# Patient Record
Sex: MALE | Race: WHITE | NOT HISPANIC OR LATINO | Employment: FULL TIME | URBAN - METROPOLITAN AREA
[De-identification: names, ages, dates, MRNs, and addresses within clinical notes are randomized per-mention and may not be internally consistent; named-entity substitution may affect disease eponyms.]

---

## 2017-06-28 DIAGNOSIS — Z01.20 ENCOUNTER FOR DENTAL EXAMINATION: ICD-10-CM

## 2017-06-28 DIAGNOSIS — K03.6 ACCRETIONS ON TEETH: ICD-10-CM

## 2017-06-28 DIAGNOSIS — K03.6 DENTAL CALCULUS: ICD-10-CM

## 2017-06-28 DIAGNOSIS — K02.9 DENTAL CARIES: ICD-10-CM

## 2018-01-05 ENCOUNTER — ALLSCRIPTS OFFICE VISIT (OUTPATIENT)
Dept: OTHER | Facility: OTHER | Age: 63
End: 2018-01-05

## 2018-01-05 LAB
CLARITY UR: NORMAL
COLOR UR: YELLOW
GLUCOSE (HISTORICAL): NORMAL
HGB UR QL STRIP.AUTO: NORMAL
KETONES UR STRIP-MCNC: NORMAL MG/DL
LEUKOCYTE ESTERASE UR QL STRIP: NORMAL
NITRITE UR QL STRIP: NORMAL
PH UR STRIP.AUTO: 5 [PH]
PROT UR STRIP-MCNC: NORMAL MG/DL

## 2018-04-27 DIAGNOSIS — E29.1 MALE HYPOGONADISM: Primary | ICD-10-CM

## 2018-06-26 ENCOUNTER — TELEPHONE (OUTPATIENT)
Dept: UROLOGY | Facility: CLINIC | Age: 63
End: 2018-06-26

## 2018-06-26 DIAGNOSIS — E29.1 TESTICULAR HYPOFUNCTION: ICD-10-CM

## 2018-06-26 DIAGNOSIS — N40.1 BPH WITH OBSTRUCTION/LOWER URINARY TRACT SYMPTOMS: Primary | ICD-10-CM

## 2018-06-26 DIAGNOSIS — N13.8 BPH WITH OBSTRUCTION/LOWER URINARY TRACT SYMPTOMS: Primary | ICD-10-CM

## 2018-06-26 NOTE — TELEPHONE ENCOUNTER
Pt  Called this morning requesting new copies of his Testosterone free/total and Psa scripts  Pt said he would be by our office tomorrow morning to pick them up

## 2018-06-29 ENCOUNTER — TRANSCRIBE ORDERS (OUTPATIENT)
Dept: ADMINISTRATIVE | Facility: HOSPITAL | Age: 63
End: 2018-06-29

## 2018-06-29 ENCOUNTER — APPOINTMENT (OUTPATIENT)
Dept: LAB | Facility: HOSPITAL | Age: 63
End: 2018-06-29
Payer: COMMERCIAL

## 2018-06-29 DIAGNOSIS — N40.1 BPH WITH OBSTRUCTION/LOWER URINARY TRACT SYMPTOMS: ICD-10-CM

## 2018-06-29 DIAGNOSIS — E29.1 TESTICULAR HYPOFUNCTION: ICD-10-CM

## 2018-06-29 DIAGNOSIS — N13.8 BPH WITH OBSTRUCTION/LOWER URINARY TRACT SYMPTOMS: ICD-10-CM

## 2018-06-29 LAB — PSA SERPL-MCNC: 0.3 NG/ML (ref 0–4)

## 2018-06-29 PROCEDURE — 84402 ASSAY OF FREE TESTOSTERONE: CPT

## 2018-06-29 PROCEDURE — 36415 COLL VENOUS BLD VENIPUNCTURE: CPT

## 2018-06-29 PROCEDURE — 84153 ASSAY OF PSA TOTAL: CPT

## 2018-06-29 PROCEDURE — 84403 ASSAY OF TOTAL TESTOSTERONE: CPT

## 2018-06-30 LAB
TESTOST FREE SERPL-MCNC: 4.9 PG/ML (ref 6.6–18.1)
TESTOST SERPL-MCNC: 231 NG/DL (ref 264–916)

## 2018-10-26 ENCOUNTER — TRANSCRIBE ORDERS (OUTPATIENT)
Dept: ADMINISTRATIVE | Facility: HOSPITAL | Age: 63
End: 2018-10-26

## 2018-10-26 ENCOUNTER — HOSPITAL ENCOUNTER (OUTPATIENT)
Dept: NON INVASIVE DIAGNOSTICS | Facility: HOSPITAL | Age: 63
Discharge: HOME | End: 2018-10-26
Payer: COMMERCIAL

## 2018-10-26 DIAGNOSIS — M54.50 ACUTE MIDLINE LOW BACK PAIN WITHOUT SCIATICA: Primary | ICD-10-CM

## 2018-10-26 DIAGNOSIS — R22.42 LEG MASS, LEFT: Primary | ICD-10-CM

## 2018-10-26 DIAGNOSIS — R22.42 LEG MASS, LEFT: ICD-10-CM

## 2018-10-26 PROCEDURE — 93971 EXTREMITY STUDY: CPT | Performed by: SURGERY

## 2018-10-26 PROCEDURE — 93971 EXTREMITY STUDY: CPT

## 2018-11-02 ENCOUNTER — HOSPITAL ENCOUNTER (OUTPATIENT)
Dept: MRI IMAGING | Facility: HOSPITAL | Age: 63
Discharge: HOME | End: 2018-11-02
Payer: COMMERCIAL

## 2018-11-02 DIAGNOSIS — M54.50 ACUTE MIDLINE LOW BACK PAIN WITHOUT SCIATICA: ICD-10-CM

## 2018-11-02 PROCEDURE — 72148 MRI LUMBAR SPINE W/O DYE: CPT

## 2018-12-07 ENCOUNTER — TELEPHONE (OUTPATIENT)
Dept: NEUROLOGY | Facility: CLINIC | Age: 63
End: 2018-12-07

## 2019-01-16 ENCOUNTER — OFFICE VISIT (OUTPATIENT)
Dept: NEUROLOGY | Facility: CLINIC | Age: 64
End: 2019-01-16
Payer: COMMERCIAL

## 2019-01-16 VITALS
DIASTOLIC BLOOD PRESSURE: 70 MMHG | HEART RATE: 67 BPM | BODY MASS INDEX: 29.33 KG/M2 | HEIGHT: 71 IN | WEIGHT: 209.5 LBS | SYSTOLIC BLOOD PRESSURE: 116 MMHG

## 2019-01-16 DIAGNOSIS — G20 PARKINSONISM, UNSPECIFIED PARKINSONISM TYPE (HCC): Primary | ICD-10-CM

## 2019-01-16 PROBLEM — G20.C PARKINSONISM: Status: ACTIVE | Noted: 2019-01-16

## 2019-01-16 PROBLEM — L40.9 PSORIASIS: Status: ACTIVE | Noted: 2019-01-16

## 2019-01-16 PROBLEM — I10 HYPERTENSION: Status: ACTIVE | Noted: 2019-01-16

## 2019-01-16 PROBLEM — M19.90 ARTHRITIS: Status: ACTIVE | Noted: 2019-01-16

## 2019-01-16 PROCEDURE — 99244 OFF/OP CNSLTJ NEW/EST MOD 40: CPT | Performed by: PSYCHIATRY & NEUROLOGY

## 2019-01-16 RX ORDER — ASPIRIN 81 MG/1
1 TABLET, CHEWABLE ORAL DAILY
COMMUNITY
Start: 2018-01-05 | End: 2020-09-01 | Stop reason: ALTCHOICE

## 2019-01-16 NOTE — ASSESSMENT & PLAN NOTE
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 gait dysfunction which consists of stooped posture, balance troubles and slowed walking speed, symptom onset in 2017  On examination the patient has fairly symmetric parkinsonism with moderate akinesia, mild symmetric rigidity in the bilateral upper and lower extremities, slight to mild focal bradykinesia bilaterally and clear gait dysfunction with absent arm swing bilaterally in a trunk lean to the right  The patient has no tremor on exam   Positive pull test  Possible rare dream reenactment  The patient has clear parkinsonism which developed 1-2 years ago, 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

## 2019-01-16 NOTE — PROGRESS NOTES
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 gait dysfunction which consists of stooped posture, balance troubles and slowed walking speed, symptom onset in 2017  On examination the patient has fairly symmetric parkinsonism with moderate akinesia, mild symmetric rigidity in the bilateral upper and lower extremities, slight to mild focal bradykinesia bilaterally and clear gait dysfunction with absent arm swing bilaterally in a trunk lean to the right  The patient has no tremor on exam   Positive pull test  Possible rare dream reenactment  The patient has clear parkinsonism which developed 1-2 years ago, 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  Diagnoses and all orders for this visit:    Parkinsonism, unspecified Parkinsonism type ([address] )  -     Uric acid; Future  -     PTH, intact; Future  -     Ceruloplasmin; Future  -     COPPER, URINE, 24 HOUR; Future  -     MRI brain wo contrast; Future    Other orders  -     aspirin 81 mg chewable tablet; Chew 1 tablet daily  -     Multiple Vitamins-Minerals (MULTIVITAMIN ADULT PO); Take 1 tablet by mouth daily           Subjective:    HPI    I had the pleasure of seeing your patient, Anastacia Hatchet in the [address] at the [facility]  The patient is a 70y o  year old right handed male who presents for evaluation of gait abnormalities    The patient reports that his symptoms were 1st noticed 15 months ago when his brother was visiting from out of town  He observed that the patient had slowed down considerably  Patient is in urgent care doctor in our system and noticed that he had been slowing down towards the end of the day for the past 1-2 years  He finds that his walking slows considerably and his speech can get quite soft  He was evaluated at [facility] for possible orthopedic issues and by ENT given his voice complaints  He was found to have some degenerative disc disease in his back and arthritis in his knees bilaterally  He underwent some injections which helped some with range of motion but not so much with his stiffness, which she describes as being worse on the right than the left  He has noticed that his posture has become more stooped  He has difficulty getting up out of a chair and when sitting down he has a tendency to "flop down  He has never been evaluated by a neurologist for this in the past   He has never been treated for the symptoms with any medications  He denies neuroleptics exposures  Regarding motor symptoms:   Tremor: no   Slowness: walking is slow  Putting his seatbelt on  "I said put the seatbelt on"   Stiffness: knees have been stiff  nothing otherwise  Dystonia: no   Changes in facial expression: seems more flat   Changes in voice: softer and hoarse as the day goes on  ENT workup was fine   Changes in writing: smaller   Changes in gait: prominenet issue, stooped posture, slow  Mild balance troubles  Falls: no    Freezing: maybe rarely  End of the day  Very brief  Trouble with swallowing: rarely dry food sticks some   Clumsiness/dexterity: no     Regarding non-motor symptoms:   Anosmia: no issue   Constipation: intentional weight loss, some with diet change   Urinary sx: 3x nocturia, rarely will dribble, urgency  Lightheadedness: no   Changes in Mood: no issues  Some anxiety with the transition from [facility]     Trouble with sleep: no issues  Fragmented only by nocturia      REM behavior Disorder: no  Once fell out of bed a few years ago during dream  No vivid dreams      Sleep apnea sx: never tested  Snores  PCP was concerned about this   Memory trouble: no issue   Hallucinations: no       The following portions of the patient's history were reviewed and updated as appropriate: allergies, current medications, past family history, past medical history, past social history, past surgical history and problem list     [facility]  Objective:    Blood pressure 116/70, pulse 67, height 5' 11" (1 803 m), weight 95 kg (209 lb 8 oz)  Physical Exam    Neurological Exam    GENERAL MEDICAL EXAMINATION:  General appearance: alert, in no apparent distress  Appropriately dressed and groomed  Conversing and interacting appropriately  Eyes: Sclera are non-injected  Ears, nose, Mouth, Throat: Mucous membranes are moist    Resp: Breathing comfortably on RA   Musculoskeletal: No evidence of deformities  No contractures  No Edema  Skin: some sebaric skin changes   Psych: normal and appropriate affect     NEUROLOGICAL EXAMINATION:  Mental Status: Alert and oriented to person place and time  Able to relate history without difficulty  Attentive: able to name VALENTÍN backward without difficulty  Language is fluent and appropriate with normal prosody  There were no paraphasic errors  Speech was not dysarthric  There was no pallilalia noted  Able to follow both midline and appendicular commands  Cranial Nerves:  II:  pupils equally round and briskly reactive to light, both directly and consensually  III-IV-VI: Full and conjugate, extra-ocular eye movements in all directions of gaze  No nystagmus or diplopia  ? slighly slowed horizontal and vertical saccades (good initiation)  Some breakdown  smooth pursuit  V: Symmetric perception of LT in V1-3      VII: Face is symmetric at rest and with activation; symmetric speed and excursion with smile  VIII: Hearing intact to finger rub bl  IX-X: Palate elevates symmetrically  XII: No tongue deviation or fasciculations    Motor:  Normal muscle bulk throughout  There were no dyskinesias or dystonia noted  No pronator drift or rebound  No asterixis or myoclonus noted  UPDRS motor       Speech  2    Facial Expression  2    Rigidity - Neck  1    Rigidity - Upper Extremity (Right)  2    Rigidity - Upper Extremity (Left)   2    Rigidity - Lower Extremity (Right)  2    Rigidity - Lower Extremity (Left)   2    Finger Taps (Right)   2 dec    Finger Taps (Left)   2    Hand Movement (Right)  2    Hand Movement (Left)   2    Pronation/Supination (Right)  1    Pronation/Supination (Left)   2    Toe Tapping (Right) 2    Toe Tapping (Left) 1    Leg Agility (Right)  2    Leg Agility (Left)   1    Arising from Chair   2    Gait   2    Freezing of Gait 0    Postural Stability   1    Posture 3    Global spontaneity of movement 3    Postural Tremor (Right) 0    Postural Tremor (Left) 0    Kinetic Tremor (Right)  0    Kinetic Tremor (Left)  0    Rest tremor amplitude RUE 0    Rest tremor amplitude LUE 0    Rest tremor amplitude RLE 0    Reset tremor amplitude LLE 0    Lip/Jaw Tremor  0    Consistency of tremor 0    Motor Exam Total:        Leaning over to the right  No arm swing bl, reduced stride length and significantl reduced step height  Turn in 4 steps  3-4 steps back on pull test      Strength:   Delt Bi Tri We FE FF    C5 C6 C7 C6 C7 C8/T1   R 5 5 5 5 5 5   L 5 5 5 5 5 5      IP Quad Hamst TA    L2 L3 L4-S1 L4   R 5 5 5 5   L 5 5 5 5     Reflexes:  2+ and symmetric throughout      Sensory: normal and symmetric perception of light touch, and temperature  Coordination: Finger to nose without dysmetria bilaterally  No intention tremor  ROS:    Review of Systems   Constitutional: Negative  Negative for appetite change and fever  HENT: Negative    Negative for hearing loss, tinnitus, trouble swallowing and voice change  Eyes: Negative  Negative for photophobia and pain  Respiratory: Negative  Negative for shortness of breath  Cardiovascular: Negative  Negative for palpitations  Gastrointestinal: Negative  Negative for nausea and vomiting  Endocrine: Negative  Negative for cold intolerance and heat intolerance  Erection difficulty   Genitourinary: Positive for urgency  Negative for dysuria and frequency  Musculoskeletal: Positive for gait problem  Negative for myalgias and neck pain  Skin: Negative  Negative for rash  Neurological: Negative for dizziness, tremors, seizures, syncope, facial asymmetry, speech difficulty, weakness, light-headedness, numbness and headaches  Hematological: Negative  Does not bruise/bleed easily  Rash   Psychiatric/Behavioral: Negative  Negative for confusion, hallucinations and sleep disturbance  The above ROS was reviewed and updated       Dallin Stern MD  Medical Director   [facility]  [facility]

## 2019-01-16 NOTE — LETTER
January 16, 2019     Unknown Rank, MD  [address]  [address] [place] [address]    Patient: José Acuña   YOB: 1955   Date of Visit: 1/16/2019       Dear Dr Enedina Serna: Thank you for referring José Acuña to me for evaluation  Below are my notes for this consultation  If you have questions, please do not hesitate to call me  I look forward to following your patient along with you  Sincerely,        Carlos Guerra MD        CC: DO Carlos Lopez MD  1/16/2019  1:54 PM  Sign at close encounter  Patient ID: José Acuña is a 70 y o  male  Assessment/Plan:    Parkinsonism [facility])    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 gait dysfunction which consists of stooped posture, balance troubles and slowed walking speed, symptom onset in 2017  On examination the patient has fairly symmetric parkinsonism with moderate akinesia, mild symmetric rigidity in the bilateral upper and lower extremities, slight to mild focal bradykinesia bilaterally and clear gait dysfunction with absent arm swing bilaterally in a trunk lean to the right  The patient has no tremor on exam   Positive pull test  Possible rare dream reenactment  The patient has clear parkinsonism which developed 1-2 years ago, 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  Diagnoses and all orders for this visit:    Parkinsonism, unspecified Parkinsonism type ([facility] [address] )  -     Uric acid;  Future  - PTH, intact; Future  -     Ceruloplasmin; Future  -     COPPER, URINE, 24 HOUR; Future  -     MRI brain wo contrast; Future    Other orders  -     aspirin 81 mg chewable tablet; Chew 1 tablet daily  -     Multiple Vitamins-Minerals (MULTIVITAMIN ADULT PO); Take 1 tablet by mouth daily           Subjective:    HPI    I had the pleasure of seeing your patient, Ubaldo Villalba in the [address] at the [facility]  The patient is a 70y o  year old right handed male who presents for evaluation of gait abnormalities  The patient reports that his symptoms were 1st noticed 15 months ago when his brother was visiting from out of town  He observed that the patient had slowed down considerably  Patient is in urgent care doctor in our system and noticed that he had been slowing down towards the end of the day for the past 1-2 years  He finds that his walking slows considerably and his speech can get quite soft  He was evaluated at [place] [address] for possible orthopedic issues and by ENT given his voice complaints  He was found to have some degenerative disc disease in his back and arthritis in his knees bilaterally  He underwent some injections which helped some with range of motion but not so much with his stiffness, which she describes as being worse on the right than the left  He has noticed that his posture has become more stooped  He has difficulty getting up out of a chair and when sitting down he has a tendency to "flop down  He has never been evaluated by a neurologist for this in the past   He has never been treated for the symptoms with any medications  He denies neuroleptics exposures  Regarding motor symptoms:   Tremor: no   Slowness: walking is slow  Putting his seatbelt on  "I said put the seatbelt on"   Stiffness: knees have been stiff  nothing otherwise     Dystonia: no   Changes in facial expression: seems more flat   Changes in voice: softer and hoarse as the day goes on  ENT workup was fine   Changes in writing: smaller   Changes in gait: prominenet issue, stooped posture, slow  Mild balance troubles  Falls: no    Freezing: maybe rarely  End of the day  Very brief  Trouble with swallowing: rarely dry food sticks some   Clumsiness/dexterity: no     Regarding non-motor symptoms:   Anosmia: no issue   Constipation: intentional weight loss, some with diet change   Urinary sx: 3x nocturia, rarely will dribble, urgency  Lightheadedness: no   Changes in Mood: no issues  Some anxiety with the transition from [facility] to [facility]  Trouble with sleep: no issues  Fragmented only by nocturia      REM behavior Disorder: no  Once fell out of bed a few years ago during dream  No vivid dreams      Sleep apnea sx: never tested  Snores  PCP was concerned about this   Memory trouble: no issue   Hallucinations: no       The following portions of the patient's history were reviewed and updated as appropriate: allergies, current medications, past family history, past medical history, past social history, past surgical history and problem list     [facility]  Objective:    Blood pressure 116/70, pulse 67, height 5' 11" (1 803 m), weight 95 kg (209 lb 8 oz)  Physical Exam    Neurological Exam    GENERAL MEDICAL EXAMINATION:  General appearance: alert, in no apparent distress  Appropriately dressed and groomed  Conversing and interacting appropriately  Eyes: Sclera are non-injected  Ears, nose, Mouth, Throat: Mucous membranes are moist    Resp: Breathing comfortably on RA   Musculoskeletal: No evidence of deformities  No contractures  No Edema  Skin: some sebaric skin changes   Psych: normal and appropriate affect     NEUROLOGICAL EXAMINATION:  Mental Status: Alert and oriented to person place and time  Able to relate history without difficulty  Attentive: able to name VALENTÍN backward without difficulty  Language is fluent and appropriate with normal prosody   There were no paraphasic errors  Speech was not dysarthric  There was no pallilalia noted  Able to follow both midline and appendicular commands  Cranial Nerves:  II:  pupils equally round and briskly reactive to light, both directly and consensually  III-IV-VI: Full and conjugate, extra-ocular eye movements in all directions of gaze  No nystagmus or diplopia  ? slighly slowed horizontal and vertical saccades (good initiation)  Some breakdown  smooth pursuit  V: Symmetric perception of LT in V1-3  VII: Face is symmetric at rest and with activation; symmetric speed and excursion with smile  VIII: Hearing intact to finger rub bl  IX-X: Palate elevates symmetrically  XII: No tongue deviation or fasciculations    Motor:  Normal muscle bulk throughout  There were no dyskinesias or dystonia noted  No pronator drift or rebound  No asterixis or myoclonus noted  UPDRS motor       Speech  2    Facial Expression  2    Rigidity - Neck  1    Rigidity - Upper Extremity (Right)  2    Rigidity - Upper Extremity (Left)   2    Rigidity - Lower Extremity (Right)  2    Rigidity - Lower Extremity (Left)   2    Finger Taps (Right)   2 dec    Finger Taps (Left)   2    Hand Movement (Right)  2    Hand Movement (Left)   2    Pronation/Supination (Right)  1    Pronation/Supination (Left)   2    Toe Tapping (Right) 2    Toe Tapping (Left) 1    Leg Agility (Right)  2    Leg Agility (Left)   1    Arising from Chair   2    Gait   2    Freezing of Gait 0    Postural Stability   1    Posture 3    Global spontaneity of movement 3    Postural Tremor (Right) 0    Postural Tremor (Left) 0    Kinetic Tremor (Right)  0    Kinetic Tremor (Left)  0    Rest tremor amplitude RUE 0    Rest tremor amplitude LUE 0    Rest tremor amplitude RLE 0    Reset tremor amplitude LLE 0    Lip/Jaw Tremor  0    Consistency of tremor 0    Motor Exam Total:        Leaning over to the right   No arm swing bl, reduced stride length and significantl reduced step height  Turn in 4 steps  3-4 steps back on pull test      Strength:   Delt Bi Tri We FE FF    C5 C6 C7 C6 C7 C8/T1   R 5 5 5 5 5 5   L 5 5 5 5 5 5      IP Quad Hamst TA    L2 L3 L4-S1 L4   R 5 5 5 5   L 5 5 5 5     Reflexes:  2+ and symmetric throughout      Sensory: normal and symmetric perception of light touch, and temperature  Coordination: Finger to nose without dysmetria bilaterally  No intention tremor  ROS:    Review of Systems   Constitutional: Negative  Negative for appetite change and fever  HENT: Negative  Negative for hearing loss, tinnitus, trouble swallowing and voice change  Eyes: Negative  Negative for photophobia and pain  Respiratory: Negative  Negative for shortness of breath  Cardiovascular: Negative  Negative for palpitations  Gastrointestinal: Negative  Negative for nausea and vomiting  Endocrine: Negative  Negative for cold intolerance and heat intolerance  Erection difficulty   Genitourinary: Positive for urgency  Negative for dysuria and frequency  Musculoskeletal: Positive for gait problem  Negative for myalgias and neck pain  Skin: Negative  Negative for rash  Neurological: Negative for dizziness, tremors, seizures, syncope, facial asymmetry, speech difficulty, weakness, light-headedness, numbness and headaches  Hematological: Negative  Does not bruise/bleed easily  Rash   Psychiatric/Behavioral: Negative  Negative for confusion, hallucinations and sleep disturbance  The above ROS was reviewed and updated       Melissa Sotelo MD  Medical Director   [facility]  [facility] Special[facility]

## 2019-01-16 NOTE — LETTER
January 16, 2019     Heather Whitney MD  [address]  [address] [address]    Patient: Ailyn Oleary   YOB: 1955   Date of Visit: 1/16/2019       Dear Dr Cosme Roblero: Thank you for referring Ailyn Oleary to me for evaluation  Below are my notes for this consultation  If you have questions, please do not hesitate to call me  I look forward to following your patient along with you  Sincerely,        Lashae Machado MD        CC: No Recipients  Lashae Machado MD  1/16/2019  1:49 PM  Sign at close encounter  Patient ID: Ailyn Oleary is a 70 y o  male  Assessment/Plan:    Parkinsonism [facility])    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 gait dysfunction which consists of stooped posture, balance troubles and slowed walking speed, symptom onset in 2017  On examination the patient has fairly symmetric parkinsonism with moderate akinesia, mild symmetric rigidity in the bilateral upper and lower extremities, slight to mild focal bradykinesia bilaterally and clear gait dysfunction with absent arm swing bilaterally in a trunk lean to the right  The patient has no tremor on exam   Positive pull test     The patient has clear parkinsonism which developed 1-2 years ago, 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  Diagnoses and all orders for this visit:    Parkinsonism, unspecified Parkinsonism type ([facility] [address] )  -     Uric acid; Future  -     PTH, intact;  Future  - Ceruloplasmin; Future  -     COPPER, URINE, 24 HOUR; Future  -     MRI brain wo contrast; Future    Other orders  -     aspirin 81 mg chewable tablet; Chew 1 tablet daily  -     Multiple Vitamins-Minerals (MULTIVITAMIN ADULT PO); Take 1 tablet by mouth daily           Subjective:    HPI    I had the pleasure of seeing your patient, Frieda Portillo in the [address] at the [facility] for Neuroscience  The patient is a 70y o  year old right handed male who presents for ***    Symptom onset/initial symptoms: 15 months ago his brother noticed he was slowing down  He is an urgent care doctor  By the end of the day his walking will slow, speech gets softer  Going to [facility] which didn't help much  Did have DDD in knees and back  Injections didn't help much other than some increasd ROM  Stooping forward  Hard to get up out of a chair  Sitting back into the chair he will fop down  2017, maybe up to 2 years  Previous neurology evaluation: no   Previous medication trials: no     Exposure to neuroleptics, pesticides, toxins, metals: no, no     Regarding motor symptoms:   Tremor: no   Slowness: walking is slow  Putting his seatbelt on  "I said put the seatbelt on"   Stiffness: knees have been stiff  nothing otherwise  Dystonia: no   Changes in facial expression: seems more flat   Changes in voice: softer and hoarse as the day goes on  ENT workup was fine   Changes in writing: smaller   Changes in gait: prominenet issue, stooped posture, slow  Mild balance troubles  Falls: no    Freezing: maybe rarely  End of the day  Very brief  Trouble with swallowing: rarely dry food sticks some   Clumsiness/dexterity: no     Regarding non-motor symptoms:   Anosmia: no issue   Constipation: intentional weight loss, some with diet change   Urinary sx: 3x nocturia, rarely will dribble, urgency  Lightheadedness: no   Changes in Mood: no issues  Some anxiety with the transition from [place] to [facility]  Trouble with sleep: no issues  Fragmented only by nocturia      REM behavior Disorder: no  Once fell out of bed a few years ago during dream  No vivid dreams      Sleep apnea sx: never tested  Snores  PCP was concerned about this   Memory trouble: no issue   Hallucinations: no       The following portions of the patient's history were reviewed and updated as appropriate: allergies, current medications, past family history, past medical history, past social history, past surgical history and problem list     [facility]  Objective:    Blood pressure 116/70, pulse 67, height 5' 11" (1 803 m), weight 95 kg (209 lb 8 oz)  Physical Exam    Neurological Exam    GENERAL MEDICAL EXAMINATION:  General appearance: alert, in no apparent distress  Appropriately dressed and groomed  Conversing and interacting appropriately  Eyes: Sclera are non-injected  Ears, nose, Mouth, Throat: Mucous membranes are moist    Resp: Breathing comfortably on RA   Musculoskeletal: No evidence of deformities  No contractures  No Edema  Skin: some sebaric skin changes   Psych: normal and appropriate affect     NEUROLOGICAL EXAMINATION:  Mental Status: Alert and oriented to person place and time  Able to relate history without difficulty  Attentive: able to name VALENTÍN backward without difficulty  Language is fluent and appropriate with normal prosody  There were no paraphasic errors  Speech was not dysarthric  There was no pallilalia noted  Able to follow both midline and appendicular commands  Cranial Nerves:  II:  pupils equally round and briskly reactive to light, both directly and consensually  III-IV-VI: Full and conjugate, extra-ocular eye movements in all directions of gaze  No nystagmus or diplopia  ? slighly slowed horizontal and vertical saccades (good initiation)  Some breakdown  smooth pursuit  V: Symmetric perception of LT in V1-3      VII: Face is symmetric at rest and with activation; symmetric speed and excursion with smile  VIII: Hearing intact to finger rub bl  IX-X: Palate elevates symmetrically  XII: No tongue deviation or fasciculations    Motor:  Normal muscle bulk throughout  There were no dyskinesias or dystonia noted  No pronator drift or rebound  No asterixis or myoclonus noted  UPDRS motor       Speech  2    Facial Expression  2    Rigidity - Neck  1    Rigidity - Upper Extremity (Right)  2    Rigidity - Upper Extremity (Left)   2    Rigidity - Lower Extremity (Right)  2    Rigidity - Lower Extremity (Left)   2    Finger Taps (Right)   2 dec    Finger Taps (Left)   2    Hand Movement (Right)  2    Hand Movement (Left)   2    Pronation/Supination (Right)  1    Pronation/Supination (Left)   2    Toe Tapping (Right) 2    Toe Tapping (Left) 1    Leg Agility (Right)  2    Leg Agility (Left)   1    Arising from Chair   2    Gait   2    Freezing of Gait 0    Postural Stability   1    Posture 3    Global spontaneity of movement 3    Postural Tremor (Right) 0    Postural Tremor (Left) 0    Kinetic Tremor (Right)  0    Kinetic Tremor (Left)  0    Rest tremor amplitude RUE 0    Rest tremor amplitude LUE 0    Rest tremor amplitude RLE 0    Reset tremor amplitude LLE 0    Lip/Jaw Tremor  0    Consistency of tremor 0    Motor Exam Total:        Leaning over to the right  No arm swing bl, reduced stride length and significantl reduced step height  Turn in 4 steps  3-4 steps back on pull test      Strength:   Delt Bi Tri We FE FF    C5 C6 C7 C6 C7 C8/T1   R 5 5 5 5 5 5   L 5 5 5 5 5 5      IP Quad Hamst TA    L2 L3 L4-S1 L4   R 5 5 5 5   L 5 5 5 5     Reflexes:  2+ and symmetric throughout      Sensory: normal and symmetric perception of light touch, and temperature  Coordination: Finger to nose without dysmetria bilaterally  No intention tremor  ROS:    Review of Systems   Constitutional: Negative  Negative for appetite change and fever  HENT: Negative    Negative for hearing loss, tinnitus, trouble swallowing and voice change  Eyes: Negative  Negative for photophobia and pain  Respiratory: Negative  Negative for shortness of breath  Cardiovascular: Negative  Negative for palpitations  Gastrointestinal: Negative  Negative for nausea and vomiting  Endocrine: Negative  Negative for cold intolerance and heat intolerance  Erection difficulty   Genitourinary: Positive for urgency  Negative for dysuria and frequency  Musculoskeletal: Positive for gait problem  Negative for myalgias and neck pain  Skin: Negative  Negative for rash  Neurological: Negative for dizziness, tremors, seizures, syncope, facial asymmetry, speech difficulty, weakness, light-headedness, numbness and headaches  Hematological: Negative  Does not bruise/bleed easily  Rash   Psychiatric/Behavioral: Negative  Negative for confusion, hallucinations and sleep disturbance  The above ROS was reviewed and updated       Kelby Tsai MD  Medical Director   [facility]  [facility]

## 2019-01-17 ENCOUNTER — TELEPHONE (OUTPATIENT)
Dept: UROLOGY | Facility: CLINIC | Age: 64
End: 2019-01-17

## 2019-01-17 NOTE — TELEPHONE ENCOUNTER
I called pt this afternoon to try and schedule him for his 1 yr f/u w/ Psa ptv  I did speak with pt and he said that he could not schedule at this time  Pt said that he did not have his schedule with him but will call us back when he does

## 2019-01-17 NOTE — TELEPHONE ENCOUNTER
We received a follow up consultation from [facility] which is already in patients chart  I noticed he does not have his yearly appointment set up yet  Please call patient and get him scheduled for his 1 year follow up/BPH with PSA prior to visit

## 2019-01-22 NOTE — TELEPHONE ENCOUNTER
Pt scheduled 2/6 w Paula Preston    Please put PSA and Testosterone labs in and fax to: [phone]

## 2019-01-22 NOTE — TELEPHONE ENCOUNTER
I called pt this afternoon to try and get him scheduled for a appt  There was no answer so I did leave a voicemail asking pt to call our office back to get this appt scheduled

## 2019-01-23 DIAGNOSIS — N40.0 BENIGN PROSTATIC HYPERPLASIA, UNSPECIFIED WHETHER LOWER URINARY TRACT SYMPTOMS PRESENT: Primary | ICD-10-CM

## 2019-01-23 DIAGNOSIS — E29.1 HYPOGONADISM IN MALE: ICD-10-CM

## 2019-01-30 ENCOUNTER — APPOINTMENT (OUTPATIENT)
Dept: LAB | Facility: HOSPITAL | Age: 64
End: 2019-01-30
Payer: COMMERCIAL

## 2019-01-30 ENCOUNTER — HOSPITAL ENCOUNTER (OUTPATIENT)
Dept: MRI IMAGING | Facility: HOSPITAL | Age: 64
Discharge: HOME | End: 2019-01-30
Payer: COMMERCIAL

## 2019-01-30 ENCOUNTER — TRANSCRIBE ORDERS (OUTPATIENT)
Dept: ADMINISTRATIVE | Facility: HOSPITAL | Age: 64
End: 2019-01-30

## 2019-01-30 DIAGNOSIS — R26.9 GAIT DISTURBANCE: ICD-10-CM

## 2019-01-30 DIAGNOSIS — G20 PARKINSONISM, UNSPECIFIED PARKINSONISM TYPE (HCC): ICD-10-CM

## 2019-01-30 DIAGNOSIS — N40.0 BENIGN PROSTATIC HYPERPLASIA, UNSPECIFIED WHETHER LOWER URINARY TRACT SYMPTOMS PRESENT: ICD-10-CM

## 2019-01-30 DIAGNOSIS — R26.9 GAIT DISTURBANCE: Primary | ICD-10-CM

## 2019-01-30 DIAGNOSIS — E29.1 HYPOGONADISM IN MALE: ICD-10-CM

## 2019-01-30 LAB
BASOPHILS # BLD AUTO: 0 THOUSANDS/ΜL (ref 0–0.1)
BASOPHILS NFR BLD AUTO: 1 % (ref 0–1)
EOSINOPHIL # BLD AUTO: 0.1 THOUSAND/ΜL (ref 0–0.4)
EOSINOPHIL NFR BLD AUTO: 2 % (ref 0–6)
ERYTHROCYTE [DISTWIDTH] IN BLOOD BY AUTOMATED COUNT: 14.2 %
HCT VFR BLD AUTO: 37.6 % (ref 41–53)
HGB BLD-MCNC: 12.3 G/DL (ref 13.5–17.5)
LYMPHOCYTES # BLD AUTO: 1.4 THOUSANDS/ΜL (ref 0.5–4)
LYMPHOCYTES NFR BLD AUTO: 24 % (ref 25–45)
MCH RBC QN AUTO: 29.2 PG (ref 26–34)
MCHC RBC AUTO-ENTMCNC: 32.6 G/DL (ref 31–36)
MCV RBC AUTO: 90 FL (ref 80–100)
MONOCYTES # BLD AUTO: 0.5 THOUSAND/ΜL (ref 0.2–0.9)
MONOCYTES NFR BLD AUTO: 9 % (ref 1–10)
NEUTROPHILS # BLD AUTO: 3.7 THOUSANDS/ΜL (ref 1.8–7.8)
NEUTS SEG NFR BLD AUTO: 65 % (ref 45–65)
PLATELET # BLD AUTO: 144 THOUSANDS/UL (ref 150–450)
PMV BLD AUTO: 8.7 FL (ref 8.9–12.7)
PSA SERPL-MCNC: 0.3 NG/ML (ref 0–4)
PTH-INTACT SERPL-MCNC: 70.4 PG/ML (ref 16.7–78.9)
RBC # BLD AUTO: 4.2 MILLION/UL (ref 4.5–5.9)
URATE SERPL-MCNC: 5.4 MG/DL (ref 3.5–8.5)
WBC # BLD AUTO: 5.7 THOUSAND/UL (ref 4.5–11)

## 2019-01-30 PROCEDURE — 84153 ASSAY OF PSA TOTAL: CPT

## 2019-01-30 PROCEDURE — 84402 ASSAY OF FREE TESTOSTERONE: CPT

## 2019-01-30 PROCEDURE — 82390 ASSAY OF CERULOPLASMIN: CPT

## 2019-01-30 PROCEDURE — 84550 ASSAY OF BLOOD/URIC ACID: CPT

## 2019-01-30 PROCEDURE — 85025 COMPLETE CBC W/AUTO DIFF WBC: CPT

## 2019-01-30 PROCEDURE — 36415 COLL VENOUS BLD VENIPUNCTURE: CPT

## 2019-01-30 PROCEDURE — 83970 ASSAY OF PARATHORMONE: CPT

## 2019-01-30 PROCEDURE — 70551 MRI BRAIN STEM W/O DYE: CPT

## 2019-01-30 PROCEDURE — 84403 ASSAY OF TOTAL TESTOSTERONE: CPT

## 2019-01-31 ENCOUNTER — TELEPHONE (OUTPATIENT)
Dept: NEUROLOGY | Facility: CLINIC | Age: 64
End: 2019-01-31

## 2019-01-31 LAB
CERULOPLASMIN SERPL-MCNC: 25.5 MG/DL (ref 16–31)
TESTOST FREE SERPL-MCNC: 3.7 PG/ML (ref 6.6–18.1)
TESTOST SERPL-MCNC: 223 NG/DL (ref 264–916)

## 2019-02-06 ENCOUNTER — OFFICE VISIT (OUTPATIENT)
Dept: UROLOGY | Facility: CLINIC | Age: 64
End: 2019-02-06
Payer: COMMERCIAL

## 2019-02-06 VITALS
HEIGHT: 71 IN | DIASTOLIC BLOOD PRESSURE: 70 MMHG | WEIGHT: 203 LBS | BODY MASS INDEX: 28.42 KG/M2 | HEART RATE: 63 BPM | SYSTOLIC BLOOD PRESSURE: 100 MMHG

## 2019-02-06 DIAGNOSIS — R39.15 URGENCY OF URINATION: ICD-10-CM

## 2019-02-06 DIAGNOSIS — E29.1 HYPOGONADISM IN MALE: ICD-10-CM

## 2019-02-06 DIAGNOSIS — N40.0 BENIGN PROSTATIC HYPERPLASIA, UNSPECIFIED WHETHER LOWER URINARY TRACT SYMPTOMS PRESENT: Primary | ICD-10-CM

## 2019-02-06 LAB
SL AMB  POCT GLUCOSE, UA: NORMAL
SL AMB LEUKOCYTE ESTERASE,UA: NORMAL
SL AMB POCT BILIRUBIN,UA: NORMAL
SL AMB POCT BLOOD,UA: NORMAL
SL AMB POCT CLARITY,UA: CLEAR
SL AMB POCT COLOR,UA: YELLOW
SL AMB POCT KETONES,UA: NORMAL
SL AMB POCT NITRITE,UA: NORMAL
SL AMB POCT PH,UA: 5
SL AMB POCT SPECIFIC GRAVITY,UA: 1.03
SL AMB POCT URINE PROTEIN: NORMAL
SL AMB POCT UROBILINOGEN: NORMAL

## 2019-02-06 PROCEDURE — 99213 OFFICE O/P EST LOW 20 MIN: CPT | Performed by: PHYSICIAN ASSISTANT

## 2019-02-06 PROCEDURE — 81002 URINALYSIS NONAUTO W/O SCOPE: CPT | Performed by: PHYSICIAN ASSISTANT

## 2019-02-06 NOTE — PROGRESS NOTES
UROLOGY PROGRESS NOTE   Patient Identifiers: Dario Juan (MRN [id number])  Date of Service: 2/6/2019    Subjective:    25-year-old male history of BPH and male hypogonadism  He is not on any replacement currently due to cost   PSA is 0 3  His testosterone is 231  AUA index score is 11  He is being evaluated for Parkinson's disease  He has 2-3 times per night nocturia  Patient has  no complaints  Objective:     VITALS:    Vitals:    02/06/19 0922   BP: 100/70   Pulse: 63     AUA SYMPTOM SCORE      Most Recent Value   AUA SYMPTOM SCORE   How often have you had a sensation of not emptying your bladder completely after you finished urinating? 2   How often have you had to urinate again less than two hours after you finished urinating? 2   How often have you found you stopped and started again several times when you urinate? 1   How often have you found it difficult to postpone urination? 3   How often have you had a weak urinary stream?  0   How often have you had to push or strain to begin urination? 0   How many times did you most typically get up to urinate from the time you went to bed at night until the time you got up in the morning?   3   Quality of Life: If you were to spend the rest of your life with your urinary condition just the way it is now, how would you feel about that?  2   AUA SYMPTOM SCORE  11            LABS:  Lab Results   Component Value Date    HGB 12 3 (L) 01/30/2019    HCT 37 6 (L) 01/30/2019    WBC 5 70 01/30/2019     (L) 01/30/2019   ]    No results found for: NA, K, CL, CO2, BUN, CREATININE, CALCIUM, GLUCOSE]        INPATIENT MEDS:    Current Outpatient Prescriptions:     aspirin 81 mg chewable tablet, Chew 1 tablet daily, Disp: , Rfl:     Multiple Vitamins-Minerals (MULTIVITAMIN ADULT PO), Take 1 tablet by mouth daily, Disp: , Rfl:       Physical Exam:   /70 (BP Location: Right arm, Patient Position: Sitting, Cuff Size: Adult)   Pulse 63   Ht 5' 11" (1 803 m)   Wt 92 1 kg (203 lb)   BMI 28 31 kg/m²   GEN: no acute distress    RESP: breathing comfortably with no accessory muscle use    ABD: soft, non-tender, non-distended   INCISION:    EXT: no significant peripheral edema   (Male): Penis circumcised, phallus normal, meatus patent  Testicles descended into scrotum bilaterally without masses nor tenderness  No inguinal hernias bilaterally  ANAYA: Prostate is not enlarged at 30 grams  The prostate is not boggy  The prostate is not tender  No nodules noted      RADIOLOGY:     None     Assessment:    1   BPH   2  Male hypogonadism     Plan:   - follow-up 1 year with PSA prior to visit  -  -  -

## 2019-02-18 ENCOUNTER — APPOINTMENT (OUTPATIENT)
Dept: LAB | Facility: CLINIC | Age: 64
End: 2019-02-18
Payer: COMMERCIAL

## 2019-02-18 DIAGNOSIS — G20 PARKINSONISM, UNSPECIFIED PARKINSONISM TYPE (HCC): ICD-10-CM

## 2019-02-18 PROCEDURE — 82570 ASSAY OF URINE CREATININE: CPT

## 2019-02-18 PROCEDURE — 82525 ASSAY OF COPPER: CPT

## 2019-02-20 LAB
COPPER 24H UR-MRATE: 53 UG/24 HR (ref 3–35)
COPPER UR-MCNC: 56 UG/L
COPPER/CREAT UR: 50 UG/G CREAT (ref 0–49)
CREAT UR-MCNC: 1.13 G/L (ref 0.3–3)

## 2019-03-20 ENCOUNTER — OFFICE VISIT (OUTPATIENT)
Dept: NEUROLOGY | Facility: CLINIC | Age: 64
End: 2019-03-20
Payer: COMMERCIAL

## 2019-03-20 VITALS
SYSTOLIC BLOOD PRESSURE: 102 MMHG | BODY MASS INDEX: 29.12 KG/M2 | WEIGHT: 208 LBS | HEART RATE: 63 BPM | HEIGHT: 71 IN | DIASTOLIC BLOOD PRESSURE: 60 MMHG

## 2019-03-20 DIAGNOSIS — R79.0 ABNORMAL BLOOD LEVEL OF COPPER: ICD-10-CM

## 2019-03-20 DIAGNOSIS — G20 PARKINSONISM, UNSPECIFIED PARKINSONISM TYPE (HCC): Primary | ICD-10-CM

## 2019-03-20 PROCEDURE — 99213 OFFICE O/P EST LOW 20 MIN: CPT | Performed by: PSYCHIATRY & NEUROLOGY

## 2019-03-20 NOTE — LETTER
March 20, 2019     DO Everett Woodward [address] [facility]    Patient: Tita Perez   YOB: 1955   Date of Visit: 3/20/2019       Dear Dr Ravin Merino:    Thank you for referring Tita Perez to me for evaluation  Below are my notes for this consultation  If you have questions, please do not hesitate to call me  I look forward to following your patient along with you  Sincerely,        Rober Llamas MD        CC: No Recipients  Rober Llamas MD  3/20/2019  1:23 PM  Signed  Assessment/Plan:    Parkinsonism [facility])  68-year-old man presents in follow-up for parkinsonism  He has prominent bradykinesia, akinesia and mild rigidity throughout  Exam is still fairly symmetric  He does have a trunk lean to the right while walking  Will start a trial of Sinemet  The patient had a significantly elevated urine copper, normal ceruloplasmin  We will recheck a 24 hour urine copper as this may have been a false positive  We will also refer to hepatology for guidance regarding next steps  Will discuss PT at next visit, would likely benefit greatly  Diagnoses and all orders for this visit:    Parkinsonism, unspecified Parkinsonism type ([facility] [address] )  -     COPPER, URINE, 24 HOUR; Future  -     carbidopa-levodopa (SINEMET)  mg per tablet; Take 1 tablet by mouth 3 (three) times a day Start with 1/2 tab TID and increase to 1 tab after 2 weeks  Abnormal blood level of copper  -     Ambulatory referral to Gastroenterology; Future        Subjective:     Patient ID: Tita Perez is a 75 y o  male  I had the pleasure of seeing your patient, Tita Perez in the [address] [facility] [address] at the [address] [place] [address] for Neuroscience  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 for parkinsonism, symptom onset in 2017 (63) with gait dysfunction which consists of stooped posture, balance troubles and slowed walking speed   On presentation he had fairly symmetric parkinsonism with moderate akinesia, mild symmetric rigidity in the bilateral upper and lower extremities, slight to mild focal bradykinesia bilaterally and clear gait dysfunction with absent arm swing bilaterally in a trunk lean to the right  The patient has no tremor on exam  Positive pull test  Possible rare dream reenactment  MRI brain, PTH and Jimenezs labs were sent given his young age at onset  His MRI brain showed mild white matter disease  Ceruloplasmin was within normal limits  24 hour urine copper was elevated at 53 (upper limit of normal is 35)  Interval history:  Normal slit lamb exam    A little bit more shuffling  One fall which was on the ice  No injury  Ran into a first 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  No tremor  When walking he tends to lean forward onto his toes and has to consciously shift his way back onto his heels        The following portions of the patient's history were reviewed and updated as appropriate: allergies, current medications, past family history, past medical history, past social history, past surgical history and problem list       Objective:      /60 (BP Location: Left arm, Patient Position: Sitting, Cuff Size: Standard)   Pulse 63   Ht 5' 11" (1 803 m)   Wt 94 3 kg (208 lb)   BMI 29 01 kg/m²          Physical Exam    Neurological Exam    UPDRS motor:                              Time since last dose:  x     Speech  3     Facial Expression  3     Rigidity - Neck  1     Rigidity - Upper Extremity (Right)  2     Rigidity - Upper Extremity (Left)   2 L>R    Rigidity - Lower Extremity (Right)  2     Rigidity - Lower Extremity (Left)   2     Finger Taps (Right)   3     Finger Taps (Left)   2     Hand Movement (Right)  2     Hand Movement (Left)   2     Pronation/Supination (Right)  2     Pronation/Supination (Left)   2     Toe Tapping (Right) 2     Toe Tapping (Left) 2 louder Leg Agility (Right)  2     Leg Agility (Left)   2     Arising from Chair   2     Gait   2     Freezing of Gait 0     Postural Stability        Posture 2     Global spontaneity of movement 3     Postural Tremor (Right) 0     Postural Tremor (Left) 0     Kinetic Tremor (Right)  0     Kinetic Tremor (Left)  0     Rest tremor amplitude RUE 0     Rest tremor amplitude LUE 0     Rest tremor amplitude RLE 0     Reset tremor amplitude LLE 0     Lip/Jaw Tremor  0     Consistency of tremor 0     Motor Exam Total:          Trunk leans to the right, no arm swing  Short and shuffling  Review of Systems   Constitutional: Negative  Negative for appetite change and fever  HENT: Negative  Negative for hearing loss, tinnitus, trouble swallowing and voice change  Eyes: Negative  Negative for photophobia and pain  Respiratory: Negative  Negative for shortness of breath  Cardiovascular: Negative  Negative for palpitations  Gastrointestinal: Negative  Negative for nausea and vomiting  Endocrine: Negative  Negative for cold intolerance and heat intolerance  Genitourinary: Negative  Negative for dysuria, frequency and urgency  Musculoskeletal: Positive for gait problem (shuffling while walking and a fall)  Negative for myalgias and neck pain  Skin: Negative  Negative for rash  Neurological: Negative for dizziness, tremors, seizures, syncope, facial asymmetry, speech difficulty, weakness, light-headedness, numbness and headaches  Hematological: Negative  Does not bruise/bleed easily  Psychiatric/Behavioral: Negative  Negative for confusion, hallucinations and sleep disturbance  The above ROS was reviewed and updated       Mile Harrington MD  Medical Director   [facility]  [facility] Specialist       Current Outpatient Medications on File Prior to Visit   Medication Sig Dispense Refill    aspirin 81 mg chewable tablet Chew 1 tablet daily      Multiple Vitamins-Minerals (MULTIVITAMIN ADULT PO) Take 1 tablet by mouth daily       No current facility-administered medications on file prior to visit

## 2019-03-20 NOTE — PROGRESS NOTES
Assessment/Plan:    Parkinsonism [facility])  28-year-old man presents in follow-up for parkinsonism  He has prominent bradykinesia, akinesia and mild rigidity throughout  Exam is still fairly symmetric  He does have a trunk lean to the right while walking  Will start a trial of Sinemet  The patient had a significantly elevated urine copper, normal ceruloplasmin  We will recheck a 24 hour urine copper as this may have been a false positive  We will also refer to hepatology for guidance regarding next steps  Will discuss PT at next visit, would likely benefit greatly  Diagnoses and all orders for this visit:    Parkinsonism, unspecified Parkinsonism type ([facility] [address] )  -     COPPER, URINE, 24 HOUR; Future  -     carbidopa-levodopa (SINEMET)  mg per tablet; Take 1 tablet by mouth 3 (three) times a day Start with 1/2 tab TID and increase to 1 tab after 2 weeks  Abnormal blood level of copper  -     Ambulatory referral to Gastroenterology; Future        Subjective:     Patient ID: Ramy Estrada is a 75 y o  male  I had the pleasure of seeing your patient, Ramy Estrada in the [address] [facility] at the [facility] for Neuroscience  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, symptom onset in 2017 (63) with gait dysfunction which consists of stooped posture, balance troubles and slowed walking speed  On presentation he had fairly symmetric parkinsonism with moderate akinesia, mild symmetric rigidity in the bilateral upper and lower extremities, slight to mild focal bradykinesia bilaterally and clear gait dysfunction with absent arm swing bilaterally in a trunk lean to the right  The patient has no tremor on exam  Positive pull test  Possible rare dream reenactment  MRI brain, PTH and Jimenezs labs were sent given his young age at onset  His MRI brain showed mild white matter disease    Ceruloplasmin was within normal limits  24 hour urine copper was elevated at 53 (upper limit of normal is 35)  Interval history:  Normal slit lamb exam    A little bit more shuffling  One fall which was on the ice  No injury  Ran into a first 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 who has Parkinson's disease, new diagnosis, nurse here at [facility]  No tremor  When walking he tends to lean forward onto his toes and has to consciously shift his way back onto his heels        The following portions of the patient's history were reviewed and updated as appropriate: allergies, current medications, past family history, past medical history, past social history, past surgical history and problem list       Objective:      /60 (BP Location: Left arm, Patient Position: Sitting, Cuff Size: Standard)   Pulse 63   Ht 5' 11" (1 803 m)   Wt 94 3 kg (208 lb)   BMI 29 01 kg/m²         Physical Exam    Neurological Exam    UPDRS motor:                              Time since last dose:  x     Speech  3     Facial Expression  3     Rigidity - Neck  1     Rigidity - Upper Extremity (Right)  2     Rigidity - Upper Extremity (Left)   2 L>R    Rigidity - Lower Extremity (Right)  2     Rigidity - Lower Extremity (Left)   2     Finger Taps (Right)   3     Finger Taps (Left)   2     Hand Movement (Right)  2     Hand Movement (Left)   2     Pronation/Supination (Right)  2     Pronation/Supination (Left)   2     Toe Tapping (Right) 2     Toe Tapping (Left) 2 louder      Leg Agility (Right)  2     Leg Agility (Left)   2     Arising from Chair   2     Gait   2     Freezing of Gait 0     Postural Stability        Posture 2     Global spontaneity of movement 3     Postural Tremor (Right) 0     Postural Tremor (Left) 0     Kinetic Tremor (Right)  0     Kinetic Tremor (Left)  0     Rest tremor amplitude RUE 0     Rest tremor amplitude LUE 0     Rest tremor amplitude RLE 0     Reset tremor amplitude LLE 0     Lip/Jaw Tremor  0     Consistency of tremor 0     Motor Exam Total:          Trunk leans to the right, no arm swing  Short and shuffling  Review of Systems   Constitutional: Negative  Negative for appetite change and fever  HENT: Negative  Negative for hearing loss, tinnitus, trouble swallowing and voice change  Eyes: Negative  Negative for photophobia and pain  Respiratory: Negative  Negative for shortness of breath  Cardiovascular: Negative  Negative for palpitations  Gastrointestinal: Negative  Negative for nausea and vomiting  Endocrine: Negative  Negative for cold intolerance and heat intolerance  Genitourinary: Negative  Negative for dysuria, frequency and urgency  Musculoskeletal: Positive for gait problem (shuffling while walking and a fall)  Negative for myalgias and neck pain  Skin: Negative  Negative for rash  Neurological: Negative for dizziness, tremors, seizures, syncope, facial asymmetry, speech difficulty, weakness, light-headedness, numbness and headaches  Hematological: Negative  Does not bruise/bleed easily  Psychiatric/Behavioral: Negative  Negative for confusion, hallucinations and sleep disturbance  The above ROS was reviewed and updated  Aylin Deleon MD  Medical Director   [facility]  [facility] Memory Specialist       Current Outpatient Medications on File Prior to Visit   Medication Sig Dispense Refill    aspirin 81 mg chewable tablet Chew 1 tablet daily      Multiple Vitamins-Minerals (MULTIVITAMIN ADULT PO) Take 1 tablet by mouth daily       No current facility-administered medications on file prior to visit

## 2019-03-20 NOTE — ASSESSMENT & PLAN NOTE
67-year-old man presents in follow-up for parkinsonism  He has prominent bradykinesia, akinesia and mild rigidity throughout  Exam is still fairly symmetric  He does have a trunk lean to the right while walking  Will start a trial of Sinemet  The patient had a significantly elevated urine copper, normal ceruloplasmin  We will recheck a 24 hour urine copper as this may have been a false positive  We will also refer to hepatology for guidance regarding next steps  Will discuss PT at next visit, would likely benefit greatly

## 2019-03-21 ENCOUNTER — TELEPHONE (OUTPATIENT)
Dept: GASTROENTEROLOGY | Facility: CLINIC | Age: 64
End: 2019-03-21

## 2019-04-08 ENCOUNTER — APPOINTMENT (OUTPATIENT)
Dept: LAB | Facility: CLINIC | Age: 64
End: 2019-04-08
Payer: COMMERCIAL

## 2019-04-08 DIAGNOSIS — G20 PARKINSONISM, UNSPECIFIED PARKINSONISM TYPE (HCC): ICD-10-CM

## 2019-04-08 PROCEDURE — 82570 ASSAY OF URINE CREATININE: CPT

## 2019-04-08 PROCEDURE — 82525 ASSAY OF COPPER: CPT

## 2019-04-10 LAB
COPPER 24H UR-MRATE: 14 UG/24 HR (ref 3–35)
COPPER UR-MCNC: 6 UG/L
COPPER/CREAT UR: 12 UG/G CREAT (ref 0–49)
CREAT UR-MCNC: 0.51 G/L (ref 0.3–3)

## 2019-04-11 DIAGNOSIS — G20 PARKINSONISM, UNSPECIFIED PARKINSONISM TYPE (HCC): ICD-10-CM

## 2019-04-30 ENCOUNTER — OFFICE VISIT (OUTPATIENT)
Dept: GASTROENTEROLOGY | Facility: CLINIC | Age: 64
End: 2019-04-30
Payer: COMMERCIAL

## 2019-04-30 VITALS
HEART RATE: 70 BPM | BODY MASS INDEX: 29.48 KG/M2 | TEMPERATURE: 98 F | DIASTOLIC BLOOD PRESSURE: 68 MMHG | SYSTOLIC BLOOD PRESSURE: 104 MMHG | HEIGHT: 71 IN | WEIGHT: 210.6 LBS

## 2019-04-30 DIAGNOSIS — R79.0 ABNORMAL BLOOD LEVEL OF COPPER: ICD-10-CM

## 2019-04-30 DIAGNOSIS — Z12.11 COLON CANCER SCREENING: Primary | ICD-10-CM

## 2019-04-30 PROCEDURE — 99244 OFF/OP CNSLTJ NEW/EST MOD 40: CPT | Performed by: INTERNAL MEDICINE

## 2019-05-06 ENCOUNTER — APPOINTMENT (OUTPATIENT)
Dept: LAB | Facility: CLINIC | Age: 64
End: 2019-05-06
Payer: COMMERCIAL

## 2019-05-06 ENCOUNTER — HOSPITAL ENCOUNTER (OUTPATIENT)
Dept: RADIOLOGY | Facility: HOSPITAL | Age: 64
Discharge: HOME | End: 2019-05-06
Attending: INTERNAL MEDICINE
Payer: COMMERCIAL

## 2019-05-06 DIAGNOSIS — R79.0 ABNORMAL BLOOD LEVEL OF COPPER: ICD-10-CM

## 2019-05-06 LAB
ALBUMIN SERPL BCP-MCNC: 3.7 G/DL (ref 3.5–5)
ALP SERPL-CCNC: 38 U/L (ref 46–116)
ALT SERPL W P-5'-P-CCNC: 9 U/L (ref 12–78)
ANION GAP SERPL CALCULATED.3IONS-SCNC: 2 MMOL/L (ref 4–13)
AST SERPL W P-5'-P-CCNC: 10 U/L (ref 5–45)
BASOPHILS # BLD AUTO: 0.03 THOUSANDS/ΜL (ref 0–0.1)
BASOPHILS NFR BLD AUTO: 1 % (ref 0–1)
BILIRUB SERPL-MCNC: 0.34 MG/DL (ref 0.2–1)
BUN SERPL-MCNC: 17 MG/DL (ref 5–25)
CALCIUM SERPL-MCNC: 8.4 MG/DL (ref 8.3–10.1)
CHLORIDE SERPL-SCNC: 107 MMOL/L (ref 100–108)
CO2 SERPL-SCNC: 30 MMOL/L (ref 21–32)
CREAT SERPL-MCNC: 0.92 MG/DL (ref 0.6–1.3)
EOSINOPHIL # BLD AUTO: 0.16 THOUSAND/ΜL (ref 0–0.61)
EOSINOPHIL NFR BLD AUTO: 4 % (ref 0–6)
ERYTHROCYTE [DISTWIDTH] IN BLOOD BY AUTOMATED COUNT: 13.1 % (ref 11.6–15.1)
GFR SERPL CREATININE-BSD FRML MDRD: 86 ML/MIN/1.73SQ M
GLUCOSE P FAST SERPL-MCNC: 100 MG/DL (ref 65–99)
HCT VFR BLD AUTO: 34.6 % (ref 36.5–49.3)
HGB BLD-MCNC: 10.9 G/DL (ref 12–17)
IMM GRANULOCYTES # BLD AUTO: 0.01 THOUSAND/UL (ref 0–0.2)
IMM GRANULOCYTES NFR BLD AUTO: 0 % (ref 0–2)
INR PPP: 1.02 (ref 0.86–1.17)
LYMPHOCYTES # BLD AUTO: 1.33 THOUSANDS/ΜL (ref 0.6–4.47)
LYMPHOCYTES NFR BLD AUTO: 30 % (ref 14–44)
MCH RBC QN AUTO: 28.9 PG (ref 26.8–34.3)
MCHC RBC AUTO-ENTMCNC: 31.5 G/DL (ref 31.4–37.4)
MCV RBC AUTO: 92 FL (ref 82–98)
MONOCYTES # BLD AUTO: 0.51 THOUSAND/ΜL (ref 0.17–1.22)
MONOCYTES NFR BLD AUTO: 11 % (ref 4–12)
NEUTROPHILS # BLD AUTO: 2.46 THOUSANDS/ΜL (ref 1.85–7.62)
NEUTS SEG NFR BLD AUTO: 54 % (ref 43–75)
NRBC BLD AUTO-RTO: 0 /100 WBCS
PLATELET # BLD AUTO: 133 THOUSANDS/UL (ref 149–390)
PMV BLD AUTO: 10.2 FL (ref 8.9–12.7)
POTASSIUM SERPL-SCNC: 4.5 MMOL/L (ref 3.5–5.3)
PROT SERPL-MCNC: 7.3 G/DL (ref 6.4–8.2)
PROTHROMBIN TIME: 13.5 SECONDS (ref 11.8–14.2)
RBC # BLD AUTO: 3.77 MILLION/UL (ref 3.88–5.62)
SODIUM SERPL-SCNC: 139 MMOL/L (ref 136–145)
WBC # BLD AUTO: 4.5 THOUSAND/UL (ref 4.31–10.16)

## 2019-05-06 PROCEDURE — 85025 COMPLETE CBC W/AUTO DIFF WBC: CPT

## 2019-05-06 PROCEDURE — 36415 COLL VENOUS BLD VENIPUNCTURE: CPT

## 2019-05-06 PROCEDURE — 76705 ECHO EXAM OF ABDOMEN: CPT

## 2019-05-06 PROCEDURE — 85610 PROTHROMBIN TIME: CPT

## 2019-05-06 PROCEDURE — 80053 COMPREHEN METABOLIC PANEL: CPT

## 2019-06-12 ENCOUNTER — OFFICE VISIT (OUTPATIENT)
Dept: NEUROLOGY | Facility: CLINIC | Age: 64
End: 2019-06-12
Payer: COMMERCIAL

## 2019-06-12 VITALS
HEART RATE: 72 BPM | DIASTOLIC BLOOD PRESSURE: 66 MMHG | HEIGHT: 71 IN | SYSTOLIC BLOOD PRESSURE: 118 MMHG | WEIGHT: 214.7 LBS | BODY MASS INDEX: 30.06 KG/M2

## 2019-06-12 DIAGNOSIS — G20 PARKINSON'S DISEASE (HCC): Primary | ICD-10-CM

## 2019-06-12 DIAGNOSIS — G20 PARKINSONISM, UNSPECIFIED PARKINSONISM TYPE (HCC): ICD-10-CM

## 2019-06-12 PROBLEM — G20.A1 PARKINSON'S DISEASE: Status: ACTIVE | Noted: 2019-01-16

## 2019-06-12 PROCEDURE — 99214 OFFICE O/P EST MOD 30 MIN: CPT | Performed by: PSYCHIATRY & NEUROLOGY

## 2019-06-19 ENCOUNTER — EVALUATION (OUTPATIENT)
Dept: PHYSICAL THERAPY | Facility: CLINIC | Age: 64
End: 2019-06-19
Payer: COMMERCIAL

## 2019-06-19 DIAGNOSIS — G20 PARKINSON'S DISEASE (HCC): ICD-10-CM

## 2019-06-19 PROCEDURE — 97162 PT EVAL MOD COMPLEX 30 MIN: CPT | Performed by: PHYSICAL THERAPIST

## 2019-06-24 ENCOUNTER — OFFICE VISIT (OUTPATIENT)
Dept: PHYSICAL THERAPY | Facility: CLINIC | Age: 64
End: 2019-06-24
Payer: COMMERCIAL

## 2019-06-24 DIAGNOSIS — G20 PARKINSON'S DISEASE (HCC): Primary | ICD-10-CM

## 2019-06-24 PROCEDURE — 97112 NEUROMUSCULAR REEDUCATION: CPT

## 2019-06-24 PROCEDURE — 97110 THERAPEUTIC EXERCISES: CPT

## 2019-06-27 ENCOUNTER — OFFICE VISIT (OUTPATIENT)
Dept: PHYSICAL THERAPY | Facility: CLINIC | Age: 64
End: 2019-06-27
Payer: COMMERCIAL

## 2019-06-27 DIAGNOSIS — G20 PARKINSON'S DISEASE (HCC): Primary | ICD-10-CM

## 2019-06-27 PROCEDURE — 97112 NEUROMUSCULAR REEDUCATION: CPT

## 2019-06-27 PROCEDURE — 97110 THERAPEUTIC EXERCISES: CPT

## 2019-07-01 ENCOUNTER — APPOINTMENT (OUTPATIENT)
Dept: PHYSICAL THERAPY | Facility: CLINIC | Age: 64
End: 2019-07-01
Payer: COMMERCIAL

## 2019-07-03 ENCOUNTER — OFFICE VISIT (OUTPATIENT)
Dept: PHYSICAL THERAPY | Facility: CLINIC | Age: 64
End: 2019-07-03
Payer: COMMERCIAL

## 2019-07-03 DIAGNOSIS — G20 PARKINSON'S DISEASE (HCC): Primary | ICD-10-CM

## 2019-07-03 PROCEDURE — 97112 NEUROMUSCULAR REEDUCATION: CPT | Performed by: PHYSICAL THERAPIST

## 2019-07-03 PROCEDURE — 97110 THERAPEUTIC EXERCISES: CPT | Performed by: PHYSICAL THERAPIST

## 2019-07-03 NOTE — PROGRESS NOTES
Daily Note     Today's date: 7/3/2019  Patient name: Lynn Rod  : 1955  MRN: [id number]  Referring provider: Leyla Robertson MD  Dx:   Encounter Diagnosis     ICD-10-CM    1  Parkinson's disease ([facility] [address] ) G20                   Subjective: No new complaints  Objective: See treatment diary below      Assessment: Challenged with dynamic balance on foam and bw ambulation in hallway and had a few minor LOB  Also challenged with higher hurdles due to limited R knee ROM  More difficulty with weightbearing and balance on R LE secondary to knee pain  Patient would benefit from continued PT      Plan: Progress treatment as tolerated  Short Term Goal Expiration Date:(19)  Long Term Goal Expiration Date: (19)  POC Expiration Date: (19)        Specialty Daily Treatment Diary   Precautions HTN, arthritis, psoarisis        Manual                                                                                          Exercise Diary   6/24  6/27  7/3       TM or bike  Bike 10 min  TM 1  3mph, 10 min  TM 1 3 mph       STS  2x5  2x10  2 x10       BW ambulation   3 laps  w/ arms 3 laps 2 laps       Hurdles  fwd/lateral, 3 laps  fwd/lateral, 3 laps  mix of regular and giraffe fw/lat 3 laps       Ambulation with HT/HN      2 laps       Step ups  fwd/lateral, 2x10  8'', fwd/lateral, 2x10  8'', fwd/lateral, 2x10       HKM  3 laps  w/ opposite UE, 3 laps   w/ opposite UE, 3 laps       TAC + PPT  5'', 10x           Bridges  5'', 10x           HS S             Mod guanako S  30''x2            alt step taps 6'', 2x10  w/ opposite UE reach, 8'',15x  Cone, foam 20x        sidestepping  GTB, 3 laps  GTB, 3 laps  foam 3 laps        Scap squeezes   2 x 10                                                                                                  Modalities

## 2019-07-08 ENCOUNTER — APPOINTMENT (OUTPATIENT)
Dept: PHYSICAL THERAPY | Facility: CLINIC | Age: 64
End: 2019-07-08
Payer: COMMERCIAL

## 2019-07-10 ENCOUNTER — APPOINTMENT (OUTPATIENT)
Dept: PHYSICAL THERAPY | Facility: CLINIC | Age: 64
End: 2019-07-10
Payer: COMMERCIAL

## 2019-07-11 ENCOUNTER — OFFICE VISIT (OUTPATIENT)
Dept: PHYSICAL THERAPY | Facility: CLINIC | Age: 64
End: 2019-07-11
Payer: COMMERCIAL

## 2019-07-11 DIAGNOSIS — G20 PARKINSON'S DISEASE (HCC): Primary | ICD-10-CM

## 2019-07-11 PROCEDURE — 97112 NEUROMUSCULAR REEDUCATION: CPT | Performed by: PHYSICAL THERAPIST

## 2019-07-11 PROCEDURE — 97150 GROUP THERAPEUTIC PROCEDURES: CPT | Performed by: PHYSICAL THERAPIST

## 2019-07-11 NOTE — PROGRESS NOTES
Daily Note     Today's date: 2019  Patient name: Alida Chan  : 1955  MRN: [id number]  Referring provider: Jose De Jesus Harden MD  Dx:   Encounter Diagnosis     ICD-10-CM    1  Parkinson's disease ([facility] [address] ) G20                   Subjective: No complaints of pain prior to session   Objective: See treatment diary below  Patient grouped from: 09:15 - 09:30 15 min  Patient performed unsupervised activity from  09:05-09:15 total 10 min  Patient 1:1 with PT from 09:30-10:00 total for 30 min    Assessment:   Requires consistent cues to take bigger steps, maintain uprihgt posture and swing arms with all dynamic activities  Improved carryover with repeated practice  Advised patient to continue with HEP and think aobut swinging his arms and taking bigger steps while walking at home  Plan: Progress treatment as tolerated  Short Term Goal Expiration Date:(19)  Long Term Goal Expiration Date: (19)  POC Expiration Date: (19)        Specialty Daily Treatment Diary   Precautions HTN, arthritis, psoarisis        Manual                                                                                          Exercise Diary   6/24  6/27  7/3 07/11     TM or bike  Bike 10 min  TM 1  3mph, 10 min  TM 1 3 mph  TM 1  4mph     STS  2x5  2x10  2 x10  2x10     BW ambulation   3 laps  w/ arms 3 laps 2 laps  80ft x 2      Hurdles  fwd/lateral, 3 laps  fwd/lateral, 3 laps  mix of regular and giraffe fw/lat 3 laps       Ambulation with HT/HN      2 laps  2 laps      Step ups  fwd/lateral, 2x10  8'', fwd/lateral, 2x10  8'', fwd/lateral, 2x10   8'', fwd/lateral, 2x15     HKM  3 laps  w/ opposite UE, 3 laps   w/ opposite UE, 3 laps  w/ opposite UE, 80ftx2     TAC + PPT  5'', 10x           Bridges  5'', 10x           HS S             Mod guanako S  30''x2            alt step taps 6'', 2x10  w/ opposite UE reach, 8'',15x  Cone, foam 20x  cone foam   20x       sidestepping  GTB, 3 laps  GTB, 3 laps  foam 3 laps   3 laps at rail     Scap squeezes   2 x 10   shoulder TB   [place] TB  Rows 20x  Ext 20x                                                                                               Modalities

## 2019-07-15 ENCOUNTER — APPOINTMENT (OUTPATIENT)
Dept: PHYSICAL THERAPY | Facility: CLINIC | Age: 64
End: 2019-07-15
Payer: COMMERCIAL

## 2019-07-17 ENCOUNTER — OFFICE VISIT (OUTPATIENT)
Dept: PHYSICAL THERAPY | Facility: CLINIC | Age: 64
End: 2019-07-17
Payer: COMMERCIAL

## 2019-07-17 DIAGNOSIS — G20 PARKINSON'S DISEASE (HCC): Primary | ICD-10-CM

## 2019-07-17 PROCEDURE — 97112 NEUROMUSCULAR REEDUCATION: CPT | Performed by: PHYSICAL THERAPIST

## 2019-07-17 PROCEDURE — 97116 GAIT TRAINING THERAPY: CPT | Performed by: PHYSICAL THERAPIST

## 2019-07-17 NOTE — PROGRESS NOTES
Progress Note     Today's date: 2019  Patient name: Ekaterina Wright  : 1955  MRN: [id number]  Referring provider: Rocio Ramos MD  Dx:   Encounter Diagnosis     ICD-10-CM    1  Parkinson's disease ([facility] [address] ) G20                   Subjective: Pt feels like endurance, strength, and posture is improving  Denies any falls or LOB  No significant changes in LBP  Objective: See treatment diary below      Assessment: Progress note completed this session and pt demo significant improvement in balance and LE strength per 5 x STS and FGA and endurance per 6 MWT  Pt also had subjective report of improved mobility and posture  Overall progressing well with PT, plan to continue for 2 more weeks focusing on independence with updated HEP and then plan to hold PT and eventually start LSVT-BIG program after his insurance changes (after 19) and when he has more time for the program to be able to come in 4x/week  Patient would benefit from continued PT  Continued to focus on trunk rotation and BIG arms/steps this session  Provided updated HEP today  Plan: Progress treatment as tolerated  Continue PT for 2 more weeks  Goals  ST weeks  1  Independent with HEP - MET  2  5 x STS < 24 sec - MET  3  TUG < 12 sec - partially MET    LT weeks   1  FGA > 21/30 - progressing  2  6 MWT > 1100 ft - MET  3  TUG manual < 14 sec - progressing   4  5 x STS < 20 sec - MET    Sit to Stand Assessment  5xSTS score (time): : 27 5 sec with posterior LOB on last STS, : 19 3     Gait Assessments  1  Timed Up and Go (TUG)              TUG Score: : 13 5 sec, : 12 5 sec               TUG Manual Score: 16 47 sec               TUG Cognitive Score: 15 25 (counting bw by 7) - stopped counting   2  6 minute walk test score: : 950 ft, : 1150 ft  3   Gait speed: : 0 9 m/s, : 1 01 m/s              Deviations: shuffling gait, flat foot initial contact B/L, R trunk lean, aberrant posture, decreased arm swings - sometimes no arm swings  4: Functional Gait Assessment (FGA) <22/30 indicates increased risk for falls: 6/19: 16/30, 7/17: 21/30     Short Term Goal Expiration Date:(7/19/19)  Long Term Goal Expiration Date: (8/19/19)  POC Expiration Date: (8/19/19)        Specialty Daily Treatment Diary   Precautions HTN, arthritis, psoarisis        Manual                                                                                          Exercise Diary   6/24  6/27  7/3 07/11  7/17:    TM or bike  Bike 10 min  TM 1  3mph, 10 min  TM 1 3 mph  TM 1  4mph     STS  2x5  2x10  2 x10  2x10  2 x 10   BW ambulation   3 laps  w/ arms 3 laps 2 laps  80ft x 2  80' x 3 BIG arm swings/steps   Hurdles  fwd/lateral, 3 laps  fwd/lateral, 3 laps  mix of regular and giraffe fw/lat 3 laps    fw/lat 3 laps   Ambulation with HT/HN      2 laps  2 laps      Step ups  fwd/lateral, 2x10  8'', fwd/lateral, 2x10  8'', fwd/lateral, 2x10   8'', fwd/lateral, 2x15     HKM  3 laps  w/ opposite UE, 3 laps   w/ opposite UE, 3 laps  w/ opposite UE, 80ftx2     TAC + PPT  5'', 10x        HEP   Bridges  5'', 10x        HEP   HS S             Mod guanako S  30''x2        HEP    alt step taps 6'', 2x10  w/ opposite UE reach, 8'',15x  Cone, foam 20x  cone foam   20x       sidestepping  GTB, 3 laps  GTB, 3 laps  foam 3 laps   3 laps at rail  foam beams 3 laps   Scap squeezes   2 x 10   shoulder TB   [place] TB  Rows 20x  Ext 20x  Shoulder TB rows 20x  Ext 20x    Trunk rotation/diagonals           YMB 10 x 2 ea side, foam    Tandem gait          foam 2 laps                                                                 Modalities

## 2019-07-18 ENCOUNTER — OFFICE VISIT (OUTPATIENT)
Dept: PHYSICAL THERAPY | Facility: CLINIC | Age: 64
End: 2019-07-18
Payer: COMMERCIAL

## 2019-07-18 DIAGNOSIS — G20 PARKINSON'S DISEASE (HCC): Primary | ICD-10-CM

## 2019-07-18 PROCEDURE — 97110 THERAPEUTIC EXERCISES: CPT | Performed by: PHYSICAL THERAPIST

## 2019-07-18 PROCEDURE — 97116 GAIT TRAINING THERAPY: CPT | Performed by: PHYSICAL THERAPIST

## 2019-07-18 PROCEDURE — 97112 NEUROMUSCULAR REEDUCATION: CPT | Performed by: PHYSICAL THERAPIST

## 2019-07-18 NOTE — PROGRESS NOTES
Daily Note     Today's date: 2019  Patient name: Lynn Rod  : 1955  MRN: [id number]  Referring provider: Leyla Robertson MD  Dx:   Encounter Diagnosis     ICD-10-CM    1  Parkinson's disease ([facility] [address] ) G20                   Subjective: "I passed my test yesterday"    Objective: See treatment diary below      Assessment: Limited carryover with  Upright posture throughout activities requring verbal and manual cues  Requires cues for arm swing with ambulation, continues to demonstrate limited trunk rotation and decreased step height  Slight pain in right shoulder reported with clip activity - however patient able to continue  Plan: Progress treatment as tolerated  Continue PT for 2 more weeks  Short Term Goal Expiration Date:(19)  Long Term Goal Expiration Date: (19)  POC Expiration Date: (19)        Specialty Daily Treatment Diary   Precautions HTN, arthritis, psoarisis        Manual                                                                                          Exercise Diary     07/:    TM or bike TM 1 6 mph  10 min    TM 1  4mph     STS 2x15    2x10  2 x 10   BW ambulation      80ft x 2  80' x 3 BIG arm swings/steps   Hurdles       fw/lat 3 laps   Ambulation with HT/HN 80ft ea      2 laps      Step ups 8'', fwd/lateral, 2x15 -from foam      8'', fwd/lateral, 2x15     HKM     w/ opposite UE, 80ftx2     TAC + PPT       HEP   Bridges       HEP   HS S          Mod guanako S       HEP    alt step taps Cone from foam 30x    cone foam   20x       sidestepping      3 laps at rail  foam beams 3 laps   Scap squeezes Shoulder TB  Green  Rows 2x15  Ext 2x15    shoulder TB   [place] TB  Rows 20x  Ext 20x  Shoulder TB rows 20x  Ext 20x    Trunk rotation/diagonals  Standing on foam   Two hands on yellow med ball   ROT to target  R and L x 10 ea    Rot to target at varying height x 10 each    Diagnoal ROT  x10    Trunk flex and ext x10 (w/some reps of ROT)      YMB 10 x 2 ea side, foam    Tandem gait       foam 2 laps    standing clips Promoting trunk ext     Red and green clips  x5 min                                                         Modalities

## 2019-07-22 ENCOUNTER — APPOINTMENT (OUTPATIENT)
Dept: PHYSICAL THERAPY | Facility: CLINIC | Age: 64
End: 2019-07-22
Payer: COMMERCIAL

## 2019-07-24 ENCOUNTER — APPOINTMENT (OUTPATIENT)
Dept: PHYSICAL THERAPY | Facility: CLINIC | Age: 64
End: 2019-07-24
Payer: COMMERCIAL

## 2019-07-24 ENCOUNTER — TELEPHONE (OUTPATIENT)
Dept: GASTROENTEROLOGY | Facility: MEDICAL CENTER | Age: 64
End: 2019-07-24

## 2019-07-24 DIAGNOSIS — Z12.11 SCREENING FOR COLON CANCER: Primary | ICD-10-CM

## 2019-07-24 NOTE — TELEPHONE ENCOUNTER
Please call Dr Edin Wharton and let him know that Suprep was re-sent to his [address] in [place]  Thank you!

## 2019-07-24 NOTE — TELEPHONE ENCOUNTER
Pt called requesting his prep to call into the [facility] pharmacy  Pt stated he will be going there in a few minutes

## 2019-07-24 NOTE — TELEPHONE ENCOUNTER
Dr Sol's patient    Patient called stated he can't find his instructions  Would like to know if he can  a copy and he does not have his prep yet   Patient can be reached at [phone]

## 2019-07-25 ENCOUNTER — ANESTHESIA EVENT (OUTPATIENT)
Dept: GASTROENTEROLOGY | Facility: HOSPITAL | Age: 64
End: 2019-07-25

## 2019-07-25 ENCOUNTER — HOSPITAL ENCOUNTER (OUTPATIENT)
Dept: GASTROENTEROLOGY | Facility: HOSPITAL | Age: 64
Setting detail: OUTPATIENT SURGERY
Discharge: HOME | End: 2019-07-25
Attending: INTERNAL MEDICINE | Admitting: INTERNAL MEDICINE
Payer: COMMERCIAL

## 2019-07-25 ENCOUNTER — APPOINTMENT (OUTPATIENT)
Dept: PHYSICAL THERAPY | Facility: CLINIC | Age: 64
End: 2019-07-25
Payer: COMMERCIAL

## 2019-07-25 ENCOUNTER — ANESTHESIA (OUTPATIENT)
Dept: GASTROENTEROLOGY | Facility: HOSPITAL | Age: 64
End: 2019-07-25

## 2019-07-25 VITALS
HEIGHT: 71 IN | RESPIRATION RATE: 16 BRPM | TEMPERATURE: 97 F | DIASTOLIC BLOOD PRESSURE: 63 MMHG | OXYGEN SATURATION: 100 % | SYSTOLIC BLOOD PRESSURE: 125 MMHG | HEART RATE: 63 BPM | WEIGHT: 214 LBS | BODY MASS INDEX: 29.96 KG/M2

## 2019-07-25 DIAGNOSIS — Z12.11 ENCOUNTER FOR SCREENING FOR MALIGNANT NEOPLASM OF COLON: ICD-10-CM

## 2019-07-25 PROCEDURE — 88305 TISSUE EXAM BY PATHOLOGIST: CPT | Performed by: PATHOLOGY

## 2019-07-25 PROCEDURE — 45385 COLONOSCOPY W/LESION REMOVAL: CPT | Performed by: INTERNAL MEDICINE

## 2019-07-25 RX ORDER — LIDOCAINE HYDROCHLORIDE 20 MG/ML
INJECTION, SOLUTION EPIDURAL; INFILTRATION; INTRACAUDAL; PERINEURAL AS NEEDED
Status: DISCONTINUED | OUTPATIENT
Start: 2019-07-25 | End: 2019-07-25 | Stop reason: SURG

## 2019-07-25 RX ORDER — LIDOCAINE HYDROCHLORIDE 10 MG/ML
0.5 INJECTION, SOLUTION EPIDURAL; INFILTRATION; INTRACAUDAL; PERINEURAL ONCE AS NEEDED
Status: DISCONTINUED | OUTPATIENT
Start: 2019-07-25 | End: 2019-07-29 | Stop reason: HOSPADM

## 2019-07-25 RX ORDER — SODIUM CHLORIDE 9 MG/ML
INJECTION, SOLUTION INTRAVENOUS CONTINUOUS PRN
Status: DISCONTINUED | OUTPATIENT
Start: 2019-07-25 | End: 2019-07-25 | Stop reason: SURG

## 2019-07-25 RX ORDER — SODIUM CHLORIDE 9 MG/ML
125 INJECTION, SOLUTION INTRAVENOUS CONTINUOUS
Status: DISCONTINUED | OUTPATIENT
Start: 2019-07-25 | End: 2019-07-29 | Stop reason: HOSPADM

## 2019-07-25 RX ORDER — DOCUSATE SODIUM 100 MG/1
100 CAPSULE, LIQUID FILLED ORAL DAILY
COMMUNITY

## 2019-07-25 RX ORDER — PROPOFOL 10 MG/ML
INJECTION, EMULSION INTRAVENOUS AS NEEDED
Status: DISCONTINUED | OUTPATIENT
Start: 2019-07-25 | End: 2019-07-25 | Stop reason: SURG

## 2019-07-25 RX ORDER — PROPOFOL 10 MG/ML
INJECTION, EMULSION INTRAVENOUS CONTINUOUS PRN
Status: DISCONTINUED | OUTPATIENT
Start: 2019-07-25 | End: 2019-07-25 | Stop reason: SURG

## 2019-07-25 RX ADMIN — PROPOFOL 50 MG: 10 INJECTION, EMULSION INTRAVENOUS at 08:41

## 2019-07-25 RX ADMIN — LIDOCAINE HYDROCHLORIDE 50 MG: 20 INJECTION, SOLUTION EPIDURAL; INFILTRATION; INTRACAUDAL; PERINEURAL at 08:40

## 2019-07-25 RX ADMIN — SODIUM CHLORIDE 125 ML/HR: 0.9 INJECTION, SOLUTION INTRAVENOUS at 07:44

## 2019-07-25 RX ADMIN — PROPOFOL 80 MCG/KG/MIN: 10 INJECTION, EMULSION INTRAVENOUS at 08:41

## 2019-07-25 RX ADMIN — SODIUM CHLORIDE: 0.9 INJECTION, SOLUTION INTRAVENOUS at 08:35

## 2019-07-25 NOTE — ANESTHESIA PREPROCEDURE EVALUATION
Review of Systems/Medical History  Patient summary reviewed  Chart reviewed  No history of anesthetic complications     Cardiovascular  Exercise tolerance (METS): >4,  Hypertension ,    Pulmonary  Smoker (quit 1976) ex-smoker  , No shortness of breath, No recent URI ,        GI/Hepatic            Endo/Other    Comment: psoriasis    GYN       Hematology   Musculoskeletal    Arthritis     Neurology      Comment: Parkinson disease Psychology           Physical Exam    Airway    Mallampati score: II  TM Distance: >3 FB  Neck ROM: full     Dental       Cardiovascular  Cardiovascular exam normal    Pulmonary  Pulmonary exam normal     Other Findings        Anesthesia Plan  ASA Score- 2     Anesthesia Type- IV sedation with anesthesia with ASA Monitors  Additional Monitors:   Airway Plan:         Plan Factors-    Induction- intravenous  Postoperative Plan-     Informed Consent- Anesthetic plan and risks discussed with patient  I personally reviewed this patient with the CRNA  Discussed and agreed on the Anesthesia Plan with the CRNA  Daniela Chen

## 2019-07-25 NOTE — ANESTHESIA POSTPROCEDURE EVALUATION
Post-Op Assessment Note    CV Status:  Stable  Pain Score: 0    Pain management: adequate     Mental Status:  Alert and awake   Hydration Status:  Euvolemic   PONV Controlled:  Controlled   Airway Patency:  Patent   Post Op Vitals Reviewed: Yes      Staff: CRNA           BP   112/68   Temp     Pulse  76   Resp   16   SpO2   99

## 2019-07-29 ENCOUNTER — APPOINTMENT (OUTPATIENT)
Dept: PHYSICAL THERAPY | Facility: CLINIC | Age: 64
End: 2019-07-29
Payer: COMMERCIAL

## 2019-07-30 ENCOUNTER — TELEPHONE (OUTPATIENT)
Dept: GASTROENTEROLOGY | Facility: AMBULARY SURGERY CENTER | Age: 64
End: 2019-07-30

## 2019-07-30 NOTE — TELEPHONE ENCOUNTER
----- Message from Mahamed Nair DO sent at 7/30/2019 12:40 PM EDT -----  Please let pt know that polyps were not precancerous, it was hyperplastic, based on this I recommend a repeat colonoscopy in 10 years, health permitting    Pt is a physician

## 2019-07-30 NOTE — TELEPHONE ENCOUNTER
Patient is aware of colonoscopy pathology results- polyps hyperplastic and recommendation for repeat colonoscopy in 10 years

## 2019-07-31 ENCOUNTER — APPOINTMENT (OUTPATIENT)
Dept: PHYSICAL THERAPY | Facility: CLINIC | Age: 64
End: 2019-07-31
Payer: COMMERCIAL

## 2019-08-01 ENCOUNTER — OFFICE VISIT (OUTPATIENT)
Dept: PHYSICAL THERAPY | Facility: CLINIC | Age: 64
End: 2019-08-01
Payer: MEDICARE

## 2019-08-01 DIAGNOSIS — G20 PARKINSON'S DISEASE (HCC): Primary | ICD-10-CM

## 2019-08-01 PROCEDURE — 97110 THERAPEUTIC EXERCISES: CPT | Performed by: PHYSICAL THERAPIST

## 2019-08-01 PROCEDURE — 97112 NEUROMUSCULAR REEDUCATION: CPT | Performed by: PHYSICAL THERAPIST

## 2019-08-01 NOTE — PROGRESS NOTES
Discharge     Today's date: 2019  Patient name: Pelon Villanueva  : 1955  MRN: [id number]  Referring provider: Hellen Irizarry MD  Dx:   Encounter Diagnosis     ICD-10-CM    1  Parkinson's disease ([facility] [address] ) G20                   Subjective: Reports compliance with HEP  Overall feels like PT has been helpful  Plans to return to PT for LSVT-BIG program in October after his vacation  Objective: See treatment diary below      Assessment: Tolerated treatment well, spent session reviewing HEP and completing exercises in POC  Pt required occasional VC for upright posture throughout the session  Patient will be d/c from skilled PT at this time  Pt plan to potentially return in October for LSVT-BIG program when he has more availability  Plan: Discharge     Short Term Goal Expiration Date:(19)  Long Term Goal Expiration Date: (19)  POC Expiration Date: (19)        Specialty Daily Treatment Diary   Precautions HTN, arthritis, psoarisis        Manual                                                                                          Exercise Diary  :    TM or bike TM 1 6 mph  10 min TM 1 6 mph  10 min   TM 1  4mph     STS 2x15 BIG 2 x 15    2x10  2 x 10   BW ambulation      80ft x 2  80' x 3 BIG arm swings/steps   Hurdles       fw/lat 3 laps   Ambulation with HT/HN 80ft ea   2 laps hallway   2 laps      Step ups 8'', fwd/lateral, 2x15 -from foam  8'', fwd/lateral, 2x15 -from foam     8'', fwd/lateral, 2x15     HKM     w/ opposite UE, 80ftx2     TAC + PPT       HEP   Bridges       HEP   HS S          Mod guanako S       HEP    alt step taps Cone from foam 30x Cone from foam 30x   cone foam   20x       sidestepping   Foam beams 3 laps    3 laps at rail  foam beams 3 laps   Scap squeezes Shoulder TB  Green  Rows 2x15  Ext 2x15 Shoulder TB  Green  Rows 2x15  Ext 2x15   shoulder TB   [place] TB  Rows 20x  Ext 20x  Shoulder TB rows 20x  Ext 20x    Trunk rotation/diagonals Standing on foam   Two hands on yellow med ball   ROT to target  R and L x 10 ea    Rot to target at varying height x 10 each    Diagnoal ROT  x10    Trunk flex and ext x10 (w/some reps of ROT) Standing on foam   Two hands on yellow med ball   ROT to target  R and L x 10 ea     YMB 10 x 2 ea side, foam    Tandem gait       foam 2 laps    standing clips Promoting trunk ext     Red and green clips  x5 min                                                         Modalities

## 2019-09-18 ENCOUNTER — OFFICE VISIT (OUTPATIENT)
Dept: NEUROLOGY | Facility: CLINIC | Age: 64
End: 2019-09-18
Payer: MEDICARE

## 2019-09-18 ENCOUNTER — TELEPHONE (OUTPATIENT)
Dept: GASTROENTEROLOGY | Facility: CLINIC | Age: 64
End: 2019-09-18

## 2019-09-18 VITALS
WEIGHT: 207 LBS | BODY MASS INDEX: 28.98 KG/M2 | SYSTOLIC BLOOD PRESSURE: 110 MMHG | DIASTOLIC BLOOD PRESSURE: 70 MMHG | HEIGHT: 71 IN | HEART RATE: 54 BPM

## 2019-09-18 DIAGNOSIS — M19.90 ARTHRITIS: ICD-10-CM

## 2019-09-18 DIAGNOSIS — I10 ESSENTIAL HYPERTENSION: ICD-10-CM

## 2019-09-18 DIAGNOSIS — D64.9 ANEMIA, UNSPECIFIED TYPE: Primary | ICD-10-CM

## 2019-09-18 DIAGNOSIS — G20 PARKINSON'S DISEASE (HCC): Primary | ICD-10-CM

## 2019-09-18 PROCEDURE — 99214 OFFICE O/P EST MOD 30 MIN: CPT | Performed by: PSYCHIATRY & NEUROLOGY

## 2019-09-18 NOTE — ASSESSMENT & PLAN NOTE
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

## 2019-09-18 NOTE — PROGRESS NOTES
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  We discussed options regarding medication changes but agreed to keep his sinemet as is  We discussed new medications in the pipeline  We discussed rock steady boxing and the importance of home exercise program      The patient reports that his trunk lean started about 1 year prior to the onset of his PD symptoms and he thinks is related to R>L knee and back arthritis  Diagnoses and all orders for this visit:    Parkinson's disease ([address] )  -     carbidopa-levodopa (SINEMET)  mg per tablet; Take 1 5 tablets by mouth 3 (three) times a day    Arthritis    Essential hypertension        Subjective:     Patient ID: Ke Kramer is a 75 y o  male  I had the pleasure of seeing your patient, Ke Kramer in the 2020 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 in follow up for probable levodopa-responsive Parkinsons disease, symptom onset in 2017 (62yo) with gait dysfunction which consists of stooped posture, balance troubles and slowed walking speed  On presentation he had fairly symmetric parkinsonism with moderate akinesia, mild symmetric rigidity in the bilateral upper and lower extremities, slight to mild focal bradykinesia bilaterally and clear gait dysfunction with absent arm swing bilaterally in a trunk lean to the right  The patient has no tremor on exam  Positive pull test  Possible rare dream reenactment       MRI brain, PTH and Jimenezs labs were sent given his relatively young age at onset  His MRI brain showed mild white matter disease  Ceruloplasmin was within normal limits   24 hour urine copper was elevated at 53 (upper limit of normal is 35)  Repeat 24 hour urine was within normal limits       Current medications and timing:  Sinemet 25/100 1 5 tab TID @ 6, 11:30, 5    Interval history:  Found PT to be helpful  Increased dose of sinemet was helpful  Happy with the state of his care at this point  Trying to keep up with his exercises at home  Regarding motor symptoms:   Tremor: no   Slowness: most prominent symptom   Changes in gait: improved  No more close calls  Falls: none        Freezing: no   Trouble with swallowing:    POA: no filed   Discussed 5 wishes     Regarding medication complications:  Wearing off: only if late with the noon dose   Dyskinesias: no       The following portions of the patient's history were reviewed and updated as appropriate: allergies, current medications, past family history, past medical history, past social history, past surgical history and problem list       Objective:  /70 (BP Location: Left arm, Patient Position: Standing, Cuff Size: Standard)   Pulse (!) 54   Ht 5' 11" (1 803 m)   Wt 93 9 kg (207 lb)   BMI 28 87 kg/m²     Physical Exam    Neurological Exam    UPDRS motor:                              Time since last dose:  2 5     Speech  1     Facial Expression  2     Rigidity - Neck       Rigidity - Upper Extremity (Right)  2     Rigidity - Upper Extremity (Left)   2     Rigidity - Lower Extremity (Right)  2     Rigidity - Lower Extremity (Left)   2     Finger Taps (Right)   2     Finger Taps (Left)   1     Hand Movement (Right)  1     Hand Movement (Left)   1     Pronation/Supination (Right)  1 slower    Pronation/Supination (Left)   1     Toe Tapping (Right) 1     Toe Tapping (Left) 1 louder    Leg Agility (Right)  0     Leg Agility (Left)   0     Arising from Chair   2     Gait   2    Freezing of Gait 0     Postural Stability        Posture 2  Trunk lean to the right     Global spontaneity of movement 2     Postural Tremor (Right) 0 Postural Tremor (Left) 0     Kinetic Tremor (Right)  0     Kinetic Tremor (Left)  0     Rest tremor amplitude RUE 0     Rest tremor amplitude LUE 0     Rest tremor amplitude RLE 0     Reset tremor amplitude LLE 0     Lip/Jaw Tremor  0     Consistency of tremor 0     Motor Exam Total:              Review of Systems   Constitutional: Negative  Negative for appetite change and fever  HENT: Negative  Negative for hearing loss, tinnitus, trouble swallowing and voice change  Eyes: Negative  Negative for photophobia and pain  Respiratory: Negative  Negative for shortness of breath  Cardiovascular: Negative  Negative for palpitations  Gastrointestinal: Negative  Negative for nausea and vomiting  Endocrine: Negative  Negative for cold intolerance and heat intolerance  Genitourinary: Negative  Negative for dysuria, frequency and urgency  Musculoskeletal: Positive for gait problem (balance and difficulty walking)  Negative for myalgias and neck pain  Skin: Negative  Negative for rash  Neurological: Negative for dizziness, tremors, seizures, syncope, facial asymmetry, speech difficulty, weakness, light-headedness, numbness and headaches  Hematological: Negative  Does not bruise/bleed easily  Psychiatric/Behavioral: Negative  Negative for confusion, hallucinations and sleep disturbance  The above ROS was reviewed and updated       Maci Quiles MD  Medical Director   [facility]  [facility]       Current Outpatient Medications on File Prior to Visit   Medication Sig Dispense Refill    aspirin 81 mg chewable tablet Chew 1 tablet daily      docusate sodium (COLACE) 100 mg capsule Take 100 mg by mouth daily       Multiple Vitamins-Minerals (MULTIVITAMIN ADULT PO) Take 1 tablet by mouth daily      psyllium (METAMUCIL) 58 6 % packet Take 1 packet by mouth daily      [DISCONTINUED] carbidopa-levodopa (SINEMET)  mg per tablet Take 1 5 tablets by mouth 3 (three) times a day 405 tablet 3     No current facility-administered medications on file prior to visit

## 2019-09-18 NOTE — TELEPHONE ENCOUNTER
mirna pt    Pt is requesting for a CBC blood test to be ordered before his f/u in Oct  Please advise if that test could be ordered [phone]

## 2019-09-18 NOTE — PATIENT INSTRUCTIONS
[facility] [place] [facility]  Tuesday & Thursdays 3:30pm - 5:00pm  [facility] [place]  [address], [place], [address]

## 2019-10-02 ENCOUNTER — APPOINTMENT (OUTPATIENT)
Dept: LAB | Facility: CLINIC | Age: 64
End: 2019-10-02
Payer: MEDICARE

## 2019-10-02 DIAGNOSIS — D64.9 ANEMIA, UNSPECIFIED TYPE: ICD-10-CM

## 2019-10-02 LAB
BASOPHILS # BLD AUTO: 0.04 THOUSANDS/ΜL (ref 0–0.1)
BASOPHILS NFR BLD AUTO: 1 % (ref 0–1)
EOSINOPHIL # BLD AUTO: 0.19 THOUSAND/ΜL (ref 0–0.61)
EOSINOPHIL NFR BLD AUTO: 4 % (ref 0–6)
ERYTHROCYTE [DISTWIDTH] IN BLOOD BY AUTOMATED COUNT: 13 % (ref 11.6–15.1)
HCT VFR BLD AUTO: 36.9 % (ref 36.5–49.3)
HGB BLD-MCNC: 11.7 G/DL (ref 12–17)
IMM GRANULOCYTES # BLD AUTO: 0.01 THOUSAND/UL (ref 0–0.2)
IMM GRANULOCYTES NFR BLD AUTO: 0 % (ref 0–2)
LYMPHOCYTES # BLD AUTO: 1.28 THOUSANDS/ΜL (ref 0.6–4.47)
LYMPHOCYTES NFR BLD AUTO: 28 % (ref 14–44)
MCH RBC QN AUTO: 29.3 PG (ref 26.8–34.3)
MCHC RBC AUTO-ENTMCNC: 31.7 G/DL (ref 31.4–37.4)
MCV RBC AUTO: 92 FL (ref 82–98)
MONOCYTES # BLD AUTO: 0.53 THOUSAND/ΜL (ref 0.17–1.22)
MONOCYTES NFR BLD AUTO: 12 % (ref 4–12)
NEUTROPHILS # BLD AUTO: 2.54 THOUSANDS/ΜL (ref 1.85–7.62)
NEUTS SEG NFR BLD AUTO: 55 % (ref 43–75)
NRBC BLD AUTO-RTO: 0 /100 WBCS
PLATELET # BLD AUTO: 155 THOUSANDS/UL (ref 149–390)
PMV BLD AUTO: 10.8 FL (ref 8.9–12.7)
RBC # BLD AUTO: 4 MILLION/UL (ref 3.88–5.62)
WBC # BLD AUTO: 4.59 THOUSAND/UL (ref 4.31–10.16)

## 2019-10-02 PROCEDURE — 85025 COMPLETE CBC W/AUTO DIFF WBC: CPT

## 2019-10-02 PROCEDURE — 36415 COLL VENOUS BLD VENIPUNCTURE: CPT

## 2019-10-09 ENCOUNTER — OFFICE VISIT (OUTPATIENT)
Dept: GASTROENTEROLOGY | Facility: CLINIC | Age: 64
End: 2019-10-09
Payer: MEDICARE

## 2019-10-09 VITALS
HEART RATE: 61 BPM | BODY MASS INDEX: 29.4 KG/M2 | DIASTOLIC BLOOD PRESSURE: 83 MMHG | HEIGHT: 71 IN | WEIGHT: 210 LBS | TEMPERATURE: 96.6 F | SYSTOLIC BLOOD PRESSURE: 124 MMHG

## 2019-10-09 DIAGNOSIS — K63.5 HYPERPLASTIC COLONIC POLYP, UNSPECIFIED PART OF COLON: Primary | ICD-10-CM

## 2019-10-09 PROCEDURE — 99213 OFFICE O/P EST LOW 20 MIN: CPT | Performed by: INTERNAL MEDICINE

## 2019-10-09 RX ORDER — IVERMECTIN 3 MG/1
TABLET ORAL
Refills: 5 | COMMUNITY
Start: 2019-10-03 | End: 2021-09-02

## 2019-10-09 NOTE — PROGRESS NOTES
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 for follow up  1  Hyperplastic colonic polyp, unspecified part of colon  Repeat colonoscopy in 10 years  2  History of Parkinson's Disease:  I do not feel this pt has Jimenez's disease as the cause as his liver images, INR, and ceruloplasmin were normal as well as his age  Follow up as needed  ______________________________________________________________________    SUBJECTIVE:  Marcos Bowden is a 75 y o  male with a history of alcoholism here for a follow up  Patient had a colonoscopy on 7/25/19 which revealed one 4 mm sessile hyperplastic polyp in the rectum  Patient denies any upper GI symptoms  Patient was believed to have Jimenez's Disease, however, his liver images, INR, and ceruloplasmin were normal     Patient reports no FHx of liver cancer, fatty liver disease, colon cancer, stomach cancer or IBD  He is a recovering alcoholic who has not had any alcohol since 2013  He continues to attend AA meetings  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  He quit smoking in 1976  He has no tattoos  He never used IV drugs  He was never told he has hepatitis  He has vacationed in [facility]  Patient had an abdominal US on 5/6/19 which revealed a prior cholecystectomy and his pancreas obscured by overlying bowel gas  CBC 10/2/19:  Hemoglobin: 11 7    CMP: 5/6/19:  AST: 10  ALT: 9        REVIEW OF SYSTEMS IS OTHERWISE NEGATIVE        Historical Information   Past Medical History:   Diagnosis Date    Arthritis     Hypertension     Psoriasis      Past Surgical History:   Procedure Laterality Date    APPENDECTOMY  1966    CHOLECYSTECTOMY      COLONOSCOPY      TESTICLE BIOPSY  1982     Social History   Social History     Substance and Sexual Activity   Alcohol Use No    Comment: quit 2013 Social History     Substance and Sexual Activity   Drug Use No     Social History     Tobacco Use   Smoking Status Former Smoker    Last attempt to quit: Antonette Gil Years since quittin 8   Smokeless Tobacco Never Used     Family History   Problem Relation Age of Onset    Parkinsonism Mother         [employer] in late 76s, lived into 80s   Prostate cancer Father     Psoriasis Father     Diabetes Maternal Grandmother     Stroke Paternal Grandmother     Diabetes Paternal Grandfather     Parkinsonism Cousin         1st cousin        Meds/Allergies       Current Outpatient Medications:     aspirin 81 mg chewable tablet    carbidopa-levodopa (SINEMET)  mg per tablet    docusate sodium (COLACE) 100 mg capsule    Multiple Vitamins-Minerals (MULTIVITAMIN ADULT PO)    psyllium (METAMUCIL) 58 6 % packet    No Known Allergies        Objective     There were no vitals taken for this visit  There is no height or weight on file to calculate BMI  PHYSICAL EXAM:      General Appearance:   Alert, cooperative, no distress   HEENT:   Normocephalic, atraumatic, anicteric      Neck:  Supple, symmetrical, trachea midline   Lungs:   Clear to auscultation bilaterally; no rales, rhonchi or wheezing; respirations unlabored    Heart[de-identified]   Regular rate and rhythm; no murmur, rub, or gallop  Abdomen:   Soft, non-tender, non-distended; normal bowel sounds; no masses, no organomegaly    Genitalia:   Deferred    Rectal:   Deferred    Extremities:  No cyanosis, clubbing or edema    Pulses:  2+ and symmetric    Skin:  No jaundice, rashes, or lesions    Lymph nodes:  No palpable cervical lymphadenopathy        Lab Results:   No visits with results within 1 Day(s) from this visit     Latest known visit with results is:   Appointment on 10/02/2019   Component Date Value    WBC 10/02/2019 4 59     RBC 10/02/2019 4 00     Hemoglobin 10/02/2019 11 7*    Hematocrit 10/02/2019 36 9     MCV 10/02/2019 92     [facility] 10/02/2019 29 3     MCHC 10/02/2019 31 7     RDW 10/02/2019 13 0     MPV 10/02/2019 10 8     Platelets 82/12/9105 155     nRBC 10/02/2019 0     Neutrophils Relative 10/02/2019 55     Immat GRANS % 10/02/2019 0     Lymphocytes Relative 10/02/2019 28     Monocytes Relative 10/02/2019 12     Eosinophils Relative 10/02/2019 4     Basophils Relative 10/02/2019 1     Neutrophils Absolute 10/02/2019 2 54     Immature Grans Absolute 10/02/2019 0 01     Lymphocytes Absolute 10/02/2019 1 28     Monocytes Absolute 10/02/2019 0 53     Eosinophils Absolute 10/02/2019 0 19     Basophils Absolute 10/02/2019 0 04          Radiology Results:   No results found  Attestation:   By signing my name below, I, Felicity Walker, attest that this documentation has been prepared under the direction and in the presence of LYNETTE Barbosa  Electronically Signed: Samantha Paz  10/9/19      I, Angie Mccabe personally performed the services described in this documentation  All medical record entries made by the scribe were at my direction and in my presence  I have reviewed the chart and discharge instructions and agree that the record reflects my personal performance and is accurate and complete   LYNETTE Barbosa  10/9/19

## 2020-01-23 ENCOUNTER — APPOINTMENT (OUTPATIENT)
Dept: LAB | Facility: CLINIC | Age: 65
End: 2020-01-23
Payer: MEDICARE

## 2020-01-23 DIAGNOSIS — N40.0 BENIGN PROSTATIC HYPERPLASIA, UNSPECIFIED WHETHER LOWER URINARY TRACT SYMPTOMS PRESENT: ICD-10-CM

## 2020-01-23 LAB — PSA SERPL-MCNC: 0.3 NG/ML (ref 0–4)

## 2020-01-23 PROCEDURE — 84153 ASSAY OF PSA TOTAL: CPT

## 2020-01-29 ENCOUNTER — OFFICE VISIT (OUTPATIENT)
Dept: UROLOGY | Facility: CLINIC | Age: 65
End: 2020-01-29
Payer: MEDICARE

## 2020-01-29 VITALS
DIASTOLIC BLOOD PRESSURE: 68 MMHG | HEIGHT: 71 IN | WEIGHT: 217 LBS | SYSTOLIC BLOOD PRESSURE: 132 MMHG | BODY MASS INDEX: 30.38 KG/M2 | HEART RATE: 72 BPM

## 2020-01-29 DIAGNOSIS — N40.1 BENIGN PROSTATIC HYPERPLASIA WITH NOCTURIA: ICD-10-CM

## 2020-01-29 DIAGNOSIS — R35.1 BENIGN PROSTATIC HYPERPLASIA WITH NOCTURIA: ICD-10-CM

## 2020-01-29 DIAGNOSIS — E34.9 TESTOSTERONE DEFICIENCY: Primary | ICD-10-CM

## 2020-01-29 PROCEDURE — 99213 OFFICE O/P EST LOW 20 MIN: CPT | Performed by: PHYSICIAN ASSISTANT

## 2020-01-29 NOTE — PROGRESS NOTES
UROLOGY PROGRESS NOTE   Patient Identifiers: Veronica Murrieta (MRN [id number])  Date of Service: 1/29/2020    Subjective:    61-year-old man with a history of BPH and testosterone deficiency  PSA was 0 3  He has several times per night nocturia  He denies any sleep disorder  His last testosterone was around 230  He has not been on any replacement but is interested in Aveed  Injections  Reason for visit:  BPH and testosterone deficiency     Objective:     VITALS:    Vitals:    01/29/20 1427   BP: 132/68   Pulse: 72           LABS:  Lab Results   Component Value Date    HGB 11 7 (L) 10/02/2019    HCT 36 9 10/02/2019    WBC 4 59 10/02/2019     10/02/2019   ]    Lab Results   Component Value Date    K 4 5 05/06/2019     05/06/2019    CO2 30 05/06/2019    BUN 17 05/06/2019    CREATININE 0 92 05/06/2019    CALCIUM 8 4 05/06/2019   ]        INPATIENT MEDS:    Current Outpatient Medications:     carbidopa-levodopa (SINEMET)  mg per tablet, Take 1 5 tablets by mouth 3 (three) times a day, Disp: 405 tablet, Rfl: 3    docusate sodium (COLACE) 100 mg capsule, Take 100 mg by mouth daily , Disp: , Rfl:     Multiple Vitamins-Minerals (MULTIVITAMIN ADULT PO), Take 1 tablet by mouth daily, Disp: , Rfl:     psyllium (METAMUCIL) 58 6 % packet, Take 1 packet by mouth daily, Disp: , Rfl:     aspirin 81 mg chewable tablet, Chew 1 tablet daily, Disp: , Rfl:     ivermectin (STROMECTOL) 3 MG TABS, TK 6 TS PO BID PRN, Disp: , Rfl: 5      Physical Exam:   /68 (BP Location: Left arm, Patient Position: Sitting, Cuff Size: Standard)   Pulse 72   Ht 5' 11" (1 803 m)   Wt 98 4 kg (217 lb)   BMI 30 27 kg/m²   GEN: no acute distress    RESP: breathing comfortably with no accessory muscle use    ABD: soft, non-tender, non-distended   INCISION:    EXT: no significant peripheral edema   (Male): Penis circumcised, phallus normal, meatus patent  Testicles descended into scrotum bilaterally  And atrophic    No inguinal hernias bilaterally  ANAYA: Prostate is not enlarged at 28 grams  The prostate is not boggy  The prostate is not tender  No nodules noted      RADIOLOGY:     None     Assessment:    1  BPH   2   Testosterone deficiency     Plan:   - will recheck his testosterone  - and if appropriate will start Aveed  injection  -  -

## 2020-02-06 ENCOUNTER — APPOINTMENT (OUTPATIENT)
Dept: LAB | Facility: CLINIC | Age: 65
End: 2020-02-06
Payer: MEDICARE

## 2020-02-06 DIAGNOSIS — E34.9 TESTOSTERONE DEFICIENCY: ICD-10-CM

## 2020-02-06 DIAGNOSIS — N40.1 BENIGN PROSTATIC HYPERPLASIA WITH NOCTURIA: ICD-10-CM

## 2020-02-06 DIAGNOSIS — E29.1 HYPOGONADISM IN MALE: ICD-10-CM

## 2020-02-06 DIAGNOSIS — R35.1 BENIGN PROSTATIC HYPERPLASIA WITH NOCTURIA: ICD-10-CM

## 2020-02-06 LAB — PSA SERPL-MCNC: 0.3 NG/ML (ref 0–4)

## 2020-02-06 PROCEDURE — 83001 ASSAY OF GONADOTROPIN (FSH): CPT

## 2020-02-06 PROCEDURE — 83002 ASSAY OF GONADOTROPIN (LH): CPT

## 2020-02-06 PROCEDURE — 84146 ASSAY OF PROLACTIN: CPT

## 2020-02-06 PROCEDURE — 84403 ASSAY OF TOTAL TESTOSTERONE: CPT

## 2020-02-06 PROCEDURE — 36415 COLL VENOUS BLD VENIPUNCTURE: CPT

## 2020-02-06 PROCEDURE — 84402 ASSAY OF FREE TESTOSTERONE: CPT

## 2020-02-06 PROCEDURE — 84153 ASSAY OF PSA TOTAL: CPT

## 2020-02-07 DIAGNOSIS — E29.1 HYPOGONADISM IN MALE: Primary | ICD-10-CM

## 2020-02-07 LAB
TESTOST FREE SERPL-MCNC: 3.4 PG/ML (ref 6.6–18.1)
TESTOST SERPL-MCNC: 193 NG/DL (ref 264–916)

## 2020-02-08 LAB
FSH SERPL-ACNC: 70.4 MIU/ML (ref 0.7–10.8)
LH SERPL-ACNC: 37.2 MIU/ML (ref 1.2–10.6)
PROLACTIN SERPL-MCNC: 6.2 NG/ML (ref 2.5–17.4)

## 2020-02-17 ENCOUNTER — TELEPHONE (OUTPATIENT)
Dept: UROLOGY | Facility: MEDICAL CENTER | Age: 65
End: 2020-02-17

## 2020-02-17 NOTE — TELEPHONE ENCOUNTER
Patient of Dr Hiram Grewal seen in the [facility] office  Patient advised that he got blood work done on 2/6/20 and he has not heard back from [place] if he is a good candidate  Please advise

## 2020-02-17 NOTE — TELEPHONE ENCOUNTER
Patient was seen by Yunior Vogel on 1/29/20  Patient with history of BPH and testosterone deficiency  At appointment, patient questioned if he would be a candidate for Aveed injections  Patient was instructed to have lab work done, prior to decision on Aveed  Please review Testosterone, FSH, LH and Prolactin results and advise on Aveed

## 2020-02-19 DIAGNOSIS — E29.1 MALE HYPOGONADISM: Primary | ICD-10-CM

## 2020-02-19 NOTE — TELEPHONE ENCOUNTER
I attempted to call patient this morning  Answering machine was full at the time of the call    Will attempt to try again at a later date

## 2020-02-19 NOTE — TELEPHONE ENCOUNTER
I spoke to the patient with his test results  I ordered Aveed  750 mg to begin once authorization is complete  If you could take care this and let me know when authorized so we can schedule him    Thank you

## 2020-02-25 NOTE — TELEPHONE ENCOUNTER
I called patient to schedule his Aveed injection  Patient was unavailable to schedule when I called  Patient will call back to set up an appointment when available

## 2020-03-10 NOTE — TELEPHONE ENCOUNTER
FYI - NO ACTION REQUIRED    Patient called to reschedule   He has been rescheduled for for 4/6 @ 1:30

## 2020-03-19 ENCOUNTER — APPOINTMENT (OUTPATIENT)
Dept: LAB | Facility: CLINIC | Age: 65
End: 2020-03-19
Payer: MEDICARE

## 2020-03-19 ENCOUNTER — OFFICE VISIT (OUTPATIENT)
Dept: NEUROLOGY | Facility: CLINIC | Age: 65
End: 2020-03-19
Payer: MEDICARE

## 2020-03-19 ENCOUNTER — TRANSCRIBE ORDERS (OUTPATIENT)
Dept: LAB | Facility: CLINIC | Age: 65
End: 2020-03-19

## 2020-03-19 VITALS
HEART RATE: 58 BPM | HEIGHT: 71 IN | BODY MASS INDEX: 30.1 KG/M2 | SYSTOLIC BLOOD PRESSURE: 122 MMHG | WEIGHT: 215 LBS | DIASTOLIC BLOOD PRESSURE: 78 MMHG

## 2020-03-19 DIAGNOSIS — G20 PARKINSON'S DISEASE (HCC): Primary | ICD-10-CM

## 2020-03-19 DIAGNOSIS — L40.9 PSORIASIS: Primary | ICD-10-CM

## 2020-03-19 DIAGNOSIS — E55.9 AVITAMINOSIS D: ICD-10-CM

## 2020-03-19 DIAGNOSIS — D64.9 ANEMIA, UNSPECIFIED TYPE: ICD-10-CM

## 2020-03-19 DIAGNOSIS — L40.9 PSORIASIS: ICD-10-CM

## 2020-03-19 DIAGNOSIS — G47.52 REM BEHAVIORAL DISORDER: ICD-10-CM

## 2020-03-19 LAB
ALBUMIN SERPL BCP-MCNC: 4.2 G/DL (ref 3.5–5)
ALP SERPL-CCNC: 49 U/L (ref 46–116)
ALT SERPL W P-5'-P-CCNC: 9 U/L (ref 12–78)
ANION GAP SERPL CALCULATED.3IONS-SCNC: 3 MMOL/L (ref 4–13)
AST SERPL W P-5'-P-CCNC: 15 U/L (ref 5–45)
BASOPHILS # BLD AUTO: 0.04 THOUSANDS/ΜL (ref 0–0.1)
BASOPHILS NFR BLD AUTO: 1 % (ref 0–1)
BILIRUB SERPL-MCNC: 0.38 MG/DL (ref 0.2–1)
BUN SERPL-MCNC: 17 MG/DL (ref 5–25)
CALCIUM SERPL-MCNC: 9 MG/DL (ref 8.3–10.1)
CHLORIDE SERPL-SCNC: 104 MMOL/L (ref 100–108)
CO2 SERPL-SCNC: 30 MMOL/L (ref 21–32)
CREAT SERPL-MCNC: 0.91 MG/DL (ref 0.6–1.3)
EOSINOPHIL # BLD AUTO: 0.23 THOUSAND/ΜL (ref 0–0.61)
EOSINOPHIL NFR BLD AUTO: 4 % (ref 0–6)
ERYTHROCYTE [DISTWIDTH] IN BLOOD BY AUTOMATED COUNT: 13.3 % (ref 11.6–15.1)
GFR SERPL CREATININE-BSD FRML MDRD: 87 ML/MIN/1.73SQ M
GLUCOSE P FAST SERPL-MCNC: 115 MG/DL (ref 65–99)
HCT VFR BLD AUTO: 38.9 % (ref 36.5–49.3)
HGB BLD-MCNC: 12.1 G/DL (ref 12–17)
IMM GRANULOCYTES # BLD AUTO: 0.02 THOUSAND/UL (ref 0–0.2)
IMM GRANULOCYTES NFR BLD AUTO: 0 % (ref 0–2)
LYMPHOCYTES # BLD AUTO: 1.33 THOUSANDS/ΜL (ref 0.6–4.47)
LYMPHOCYTES NFR BLD AUTO: 24 % (ref 14–44)
MCH RBC QN AUTO: 28.7 PG (ref 26.8–34.3)
MCHC RBC AUTO-ENTMCNC: 31.1 G/DL (ref 31.4–37.4)
MCV RBC AUTO: 92 FL (ref 82–98)
MONOCYTES # BLD AUTO: 0.54 THOUSAND/ΜL (ref 0.17–1.22)
MONOCYTES NFR BLD AUTO: 10 % (ref 4–12)
NEUTROPHILS # BLD AUTO: 3.44 THOUSANDS/ΜL (ref 1.85–7.62)
NEUTS SEG NFR BLD AUTO: 61 % (ref 43–75)
NRBC BLD AUTO-RTO: 0 /100 WBCS
PLATELET # BLD AUTO: 162 THOUSANDS/UL (ref 149–390)
PMV BLD AUTO: 10.8 FL (ref 8.9–12.7)
POTASSIUM SERPL-SCNC: 4.8 MMOL/L (ref 3.5–5.3)
PROT SERPL-MCNC: 8 G/DL (ref 6.4–8.2)
RBC # BLD AUTO: 4.22 MILLION/UL (ref 3.88–5.62)
SODIUM SERPL-SCNC: 137 MMOL/L (ref 136–145)
WBC # BLD AUTO: 5.6 THOUSAND/UL (ref 4.31–10.16)

## 2020-03-19 PROCEDURE — 82652 VIT D 1 25-DIHYDROXY: CPT

## 2020-03-19 PROCEDURE — 80053 COMPREHEN METABOLIC PANEL: CPT

## 2020-03-19 PROCEDURE — 36415 COLL VENOUS BLD VENIPUNCTURE: CPT

## 2020-03-19 PROCEDURE — 85025 COMPLETE CBC W/AUTO DIFF WBC: CPT

## 2020-03-19 PROCEDURE — 99214 OFFICE O/P EST MOD 30 MIN: CPT | Performed by: PSYCHIATRY & NEUROLOGY

## 2020-03-19 NOTE — PROGRESS NOTES
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  He is interested in starting the [employer] program which I provided him a prescription for  We discussed different options regarding medication management but agreed to keep things unchanged for the time being  Again discussed safety issues regarding REM sleep behavior disorder  Diagnoses and all orders for this visit:    Parkinson's disease [facility])  -     Ambulatory referral to Physical Therapy; Future  -     Ambulatory referral to Speech Therapy; Future  -     carbidopa-levodopa (SINEMET)  mg per tablet; Take 1 5 tablets by mouth 3 (three) times a day    REM behavioral disorder        Subjective:     Patient ID: Shanta Goldberg is a 77 y o  male  I had the pleasure of seeing your patient, Shanta Goldberg in the 2020 [facility] at the [facility]  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 who presents today in follow up for levodopa-responsive Parkinsons disease, symptom onset in 2017 (62yo) with gait dysfunction which consists of stooped posture, balance troubles and slowed walking speed  On presentation he had fairly symmetric parkinsonism with moderate akinesia, mild symmetric rigidity in the bilateral upper and lower extremities, slight to mild focal bradykinesia bilaterally and clear gait dysfunction with absent arm swing bilaterally in a trunk lean to the right  The patient has no tremor on exam  Positive pull test  Possible rare dream reenactment       MRI brain, PTH and Jimenezs labs were sent given his relatively young age at onset  His MRI brain showed mild white matter disease   Ceruloplasmin was within normal limits  24 hour urine copper was elevated at 53 (upper limit of normal is 35)  Repeat 24 hour urine was within normal limits       Current medications and timing:  Sinemet 25/100 1 5 tab TID @ 6, 11:30, 5    Interval history:  Increased dose was helpful  feels stronger  Less "drop off" at the end of each dose  Has been working on getting [employer] set up  Trouble buckeling seatbelt with left hand  Regarding motor symptoms:   Tremor: none   Slowness: stable   Stiffness: stable   Changes in gait: about the same        Falls: no        Freezing: no   Trouble with swallowing: no     Regarding non-motor symptoms:   Mood: "doing ok"  Anxious about the world  Sleep: no issues  Nocturia x1  Memory trouble: No issues   Hallucinations: no     Driving issues: no   POA: not yet done  Regarding medication complications:  Wearing off: no   Dyskinesias: no       The following portions of the patient's history were reviewed and updated as appropriate: allergies, current medications, past family history, past medical history, past social history, past surgical history and problem list       Objective:  /78 (BP Location: Left arm, Patient Position: Standing, Cuff Size: Standard)   Pulse 58   Ht 5' 11" (1 803 m)   Wt 97 5 kg (215 lb)   BMI 29 99 kg/m²     Physical Exam    Neurological Exam    GENERAL MEDICAL EXAMINATION:  General appearance: alert, in no apparent distress  Appropriately dressed and groomed  Conversing and interacting appropriately  Eyes: Sclera are non-injected  Ears, nose, Mouth, Throat: Mucous membranes are moist    Resp: Breathing comfortably on RA   Musculoskeletal: No evidence of deformities  No contractures  No Edema  Skin: No visible rashes  Warm and well perfused  Psych: normal and appropriate affect     Mental Status:  Alert and oriented to person place and time  Able to relate history without difficulty  Attentive to conversation   Language is fluent and appropriate with normal prosody  There were no paraphasic errors  Speech was not dysarthric  Able to follow both midline and appendicular commands  General Neurology examination:     UPDRS motor:  COVID                             Time since last dose:       Speech  1     Facial Expression  2     Rigidity - Neck       Rigidity - Upper Extremity (Right)       Rigidity - Upper Extremity (Left)        Rigidity - Lower Extremity (Right)       Rigidity - Lower Extremity (Left)        Finger Taps (Right)   2     Finger Taps (Left)   2     Hand Movement (Right)  1     Hand Movement (Left)   1     Pronation/Supination (Right)  1     Pronation/Supination (Left)   2     Toe Tapping (Right) 0     Toe Tapping (Left) 1     Leg Agility (Right)  0     Leg Agility (Left)   1     Arising from Chair   1     Gait   1     Freezing of Gait 0     Postural Stability        Posture 3  lean to right   Global spontaneity of movement 2-3     Postural Tremor (Right) 0     Postural Tremor (Left) 0     Kinetic Tremor (Right)  0     Kinetic Tremor (Left)  0     Rest tremor amplitude RUE 0     Rest tremor amplitude LUE 0     Rest tremor amplitude RLE 0     Reset tremor amplitude LLE 0     Lip/Jaw Tremor  0     Consistency of tremor 0     Motor Exam Total:        Stable trunk lean  Reduced arm swing bl  Turns well in just 3 steps  Review of Systems   Constitutional: Negative  Negative for appetite change and fever  HENT: Negative  Negative for hearing loss, tinnitus, trouble swallowing and voice change  Eyes: Negative  Negative for photophobia and pain  Respiratory: Negative  Negative for shortness of breath  Cardiovascular: Negative  Negative for palpitations  Gastrointestinal: Negative  Negative for nausea and vomiting  Endocrine: Negative  Negative for cold intolerance and heat intolerance  Genitourinary: Negative  Negative for dysuria, frequency and urgency  Musculoskeletal: Negative    Negative for myalgias and neck pain    Skin: Negative  Negative for rash  Neurological: Negative  Negative for dizziness, tremors, seizures, syncope, facial asymmetry, speech difficulty, weakness, light-headedness, numbness and headaches  Hematological: Negative  Does not bruise/bleed easily  Psychiatric/Behavioral: Negative  Negative for confusion, hallucinations and sleep disturbance  The above ROS was reviewed and updated       Halle Carolina MD  Medical Director   [facility]  [facility]       Current Outpatient Medications on File Prior to Visit   Medication Sig Dispense Refill    docusate sodium (COLACE) 100 mg capsule Take 100 mg by mouth daily       Multiple Vitamins-Minerals (MULTIVITAMIN ADULT PO) Take 1 tablet by mouth daily      psyllium (METAMUCIL) 58 6 % packet Take 1 packet by mouth daily      [DISCONTINUED] carbidopa-levodopa (SINEMET)  mg per tablet Take 1 5 tablets by mouth 3 (three) times a day 405 tablet 3    aspirin 81 mg chewable tablet Chew 1 tablet daily      ivermectin (STROMECTOL) 3 MG TABS TK 6 TS PO BID PRN  5     Current Facility-Administered Medications on File Prior to Visit   Medication Dose Route Frequency Provider Last Rate Last Dose    testosterone undecanoate (AVEED) intramuscular injection SOLN 750 mg  750 mg Intramuscular Once [employer], PA-C

## 2020-03-19 NOTE — ASSESSMENT & PLAN NOTE
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

## 2020-03-23 LAB — 1,25(OH)2D3 SERPL-MCNC: 16.3 PG/ML (ref 19.9–79.3)

## 2020-03-25 ENCOUNTER — OFFICE VISIT (OUTPATIENT)
Dept: UROLOGY | Facility: CLINIC | Age: 65
End: 2020-03-25
Payer: MEDICARE

## 2020-03-25 VITALS
SYSTOLIC BLOOD PRESSURE: 124 MMHG | HEART RATE: 68 BPM | DIASTOLIC BLOOD PRESSURE: 64 MMHG | HEIGHT: 71 IN | BODY MASS INDEX: 29.68 KG/M2 | WEIGHT: 212 LBS

## 2020-03-25 DIAGNOSIS — E29.1 MALE HYPOGONADISM: Primary | ICD-10-CM

## 2020-03-25 PROCEDURE — 96372 THER/PROPH/DIAG INJ SC/IM: CPT

## 2020-03-25 PROCEDURE — 99213 OFFICE O/P EST LOW 20 MIN: CPT | Performed by: PHYSICIAN ASSISTANT

## 2020-03-25 NOTE — PROGRESS NOTES
UROLOGY PROGRESS NOTE   Patient Identifiers: Rita Booth (MRN [id number])  Date of Service: 3/25/2020    Subjective:      26-year-old man with a history of BPH and testosterone deficiency  He is here for his 1st Aveed  Injection  I reviewed the injection procedure possible risks and complications  He will remain in the waiting room for 20 minutes post injection to watch for any immediate side effects  He had the appropriate laboratory studies prior to this appointment and approval of Aveed  His PSA is 0 3  His testosterone was 230 and 193 respectively  FSH and LH were both elevated  H&H is 12 1 and 38 9  Reason for visit:  Testosterone deficiency    Objective:     VITALS:    Vitals:    03/25/20 1001   BP: 124/64   Pulse: 68     AUA SYMPTOM SCORE      Most Recent Value   AUA SYMPTOM SCORE   How often have you had a sensation of not emptying your bladder completely after you finished urinating? 0   How often have you had to urinate again less than two hours after you finished urinating? 1   How often have you found you stopped and started again several times when you urinate?  0   How often have you found it difficult to postpone urination? 1   How often have you had a weak urinary stream?  0   How often have you had to push or strain to begin urination? 0   How many times did you most typically get up to urinate from the time you went to bed at night until the time you got up in the morning?   2   Quality of Life: If you were to spend the rest of your life with your urinary condition just the way it is now, how would you feel about that?  1   AUA SYMPTOM SCORE  4            LABS:  Lab Results   Component Value Date    HGB 12 1 03/19/2020    HCT 38 9 03/19/2020    WBC 5 60 03/19/2020     03/19/2020   ]    Lab Results   Component Value Date    K 4 8 03/19/2020     03/19/2020    CO2 30 03/19/2020    BUN 17 03/19/2020    CREATININE 0 91 03/19/2020    CALCIUM 9 0 03/19/2020 ]        INPATIENT MEDS:    Current Outpatient Medications:     carbidopa-levodopa (SINEMET)  mg per tablet, Take 1 5 tablets by mouth 3 (three) times a day, Disp: 405 tablet, Rfl: 3    docusate sodium (COLACE) 100 mg capsule, Take 100 mg by mouth daily , Disp: , Rfl:     Multiple Vitamins-Minerals (MULTIVITAMIN ADULT PO), Take 1 tablet by mouth daily, Disp: , Rfl:     psyllium (METAMUCIL) 58 6 % packet, Take 1 packet by mouth daily, Disp: , Rfl:     aspirin 81 mg chewable tablet, Chew 1 tablet daily, Disp: , Rfl:     ivermectin (STROMECTOL) 3 MG TABS, TK 6 TS PO BID PRN, Disp: , Rfl: 5  No current facility-administered medications for this visit  Physical Exam:   /64 (BP Location: Left arm, Patient Position: Sitting, Cuff Size: Adult)   Pulse 68   Ht 5' 11" (1 803 m)   Wt 96 2 kg (212 lb)   BMI 29 57 kg/m²   GEN: no acute distress    RESP: breathing comfortably with no accessory muscle use    ABD: soft, non-tender, non-distended   INCISION:    EXT: no significant peripheral edema   (Male):  Previous exam found the prostate to be about 28 grams and benign and the testicles to be atrophic bilaterally   INJECTION:After obtaining consent, and per orders  injection of Aveed 750mg  given by in the right GM  Wendy Greene PA-C  Patient instructed to remain in clinic for 20 minutes afterwards, and to report any adverse reaction to me immediately  RADIOLOGY:    none     Assessment:     1   Male hypogonadism     Plan:   - he was released from the clinic after 20 minutes waiting period without any side effects  - follow-up in 4 weeks for his next injection  - each injection after that will be at 10 week intervals  -  I explained it usually takes several months to reach is steady state so  blood work  Is recommended between the 3rd and 4th injection including H&H and testosterone than usually twice a year thereafter

## 2020-03-31 ENCOUNTER — EVALUATION (OUTPATIENT)
Dept: SPEECH THERAPY | Facility: CLINIC | Age: 65
End: 2020-03-31
Payer: MEDICARE

## 2020-03-31 ENCOUNTER — EVALUATION (OUTPATIENT)
Dept: PHYSICAL THERAPY | Facility: CLINIC | Age: 65
End: 2020-03-31

## 2020-03-31 DIAGNOSIS — R49.9 UNSPECIFIED VOICE AND RESONANCE DISORDER: Primary | ICD-10-CM

## 2020-03-31 DIAGNOSIS — G20 PARKINSON'S DISEASE (HCC): ICD-10-CM

## 2020-03-31 PROCEDURE — 92524 BEHAVRAL QUALIT ANALYS VOICE: CPT | Performed by: SPEECH-LANGUAGE PATHOLOGIST

## 2020-03-31 NOTE — PROGRESS NOTES
Speech-Language Pathology Initial Evaluation    Today's date: 3/31/2020  Patients name: Ladonna Bello  : 1955  MRN: [id number]  Safety measures: PD  Referring provider: Ananth Florian MD    Primary Diagnosis/Billing code: N50 9  Secondary Diagnosis/ Billing code: [address]    Visit Tracking:  -Referring provider: Epic  -Billing guidelines: CMS  -Visit #1/10  -Insurance: [place] [id number])  -RE due     Subjective comments: "I'm fine "    How did the patient hear about us? Physician    Patient's goal(s): "adaptions regarding voice strength and clarity"    Reason for referral: Change in vocal quality  Prior functional status: Communication effective and appropriate in all situations  Clinically complex situations: N/A    History: Patient is a 77 y o  male who was referred to outpatient skilled Speech Therapy services for an LSVT LOUD evaluation  Patient is diagnosed with Parkinson's disease about 18 months ago (symptom onset in 2017)  Per review of neurology report (2020), patient presents with "   gait dysfunction, which consists of stooped posture, balance troubles, and slowed walking speed  On presentation, he had fairly symmetric parkinsonism with moderate akinesia, mild symmetric rigidity in the bilateral upper and lower extremities, slight to mild focal bradykinesia bilaterally and clear gait dysfunction with absent arm swing bilaterally in a trunk lean to the right  The patient has no tremor on exam  Positive pull test  Possible rare dream reenactment  Yung Sun He is doing well with carbidopa/levodopa at 1 5 tablets 3 times a day  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  He is interested in starting the [facility] program which I provided him a prescription for  We discussed different options regarding medication management but agreed to keep things unchanged for the time being  Again discussed safety issues regarding REM sleep behavior disorder   "     Per patient report, "The more I talk, the more fatigued [my voice] gets " patient reported that it's not as hoarse since initiating PD medication  Patient expressed that his voice is quieter when questioned by clinician  Patient reported that he's roughly 67% intelligible with familiar listeners and 60% intelligible with unfamiliar listeners  Hearing: WFL for testing  Vision: WFL with glasses    Home environment/lifestyle: Lives with wife, son, and grandson  Highest level of education: Doctoral degree  Vocational status: Urgent Care physician     Mental status: Alert  Behavior status: Cooperative  Communication modalities: Verbal  Rehabilitation prognosis: Good rehab potential to reach the established goals    Assessments    [facility] Voice Treatment (LSVT) LOUD assessment:    Sound Level Meter-to-Mouth Distance: 50 cm throughout testing    Maximum Duration of Sustained Vowel Phonation (/ah/):   Average MDP 9 3 sec    Average Intensity 70 1 dB SPL       Maximum Fundamental Frequency Range:   Highest Pitch 220 0 Hz   Lowest Pitch 171 5 Hz   Average Intensity 76 9 dB SPL     Reading of Words:   Average Intensity  66 0 dB SPL     Reading of Phrases:   Average Intensity  66 7 dB SPL     Conversational Monologue:   Average Intensity  64 6 dB SPL     *Data gathered from reading and conversation tasks revealed vocal SPL levels that may reduce patients speech intelligibility and communicative effectiveness in his functional living environment  **STIMULABILITY TESTING TO [address] LOUD! **    Maximum Duration of Sustained Vowel Phonation (/ah/):   Average MDP 16 5 sec    Average Intensity 78 4 dB SPL       Maximum Fundamental Frequency Range:   Highest Pitch 231 0 Hz   Lowest Pitch 144 5 Hz   Average Intensity 78 9 dB SPL     Reading of Phrases:   Average Intensity  73 2 dB SPL     *Patient demonstrated vocal improvement with loudness and quality in all tasks when stimulated  Voice Handicap Index (VHI):   The VHI is a list of 30 statements that many people have used to describe their voices and the effects of their voices on their lives  Patient indicated how frequently he has the same experience using a rating point scale (never = 0, almost never = 1, sometimes = 2, almost always = 3, and always = 4)  Results were as follows:    Subscale: Score: Self-Perceived Impairment Level:   Physical 19/40 Moderate   Emotional 12/40 Mild-Moderate   Functional 4/40 Mild        TOTAL 35/120 Mild-Moderate     Goals    Short-term goals:  1  Patient will be educated on vocal hygiene and demonstrate understanding of recommendations to facilitate improved vocal quality (to be achieved in 1-2 weeks)  2  Patient will demonstrate understanding that his/her louder voice is WNL and will become comfortable increasing his/her phonatory effort (to be achieved in 4 weeks)  3  Patient will develop the ability to self-monitor adequate loudness levels in conversation to facilitate increased communication success (to be achieved in 4 weeks)  4  Patient will be able to increase his/her vocal loudness to reach a target sound pressure level of 75 dB SPL with sustained phonation in order to increase vocal respiratory support required for functional communication (to be achieved in 4 weeks)  5  Patient will be able to increase his vocal loudness to reach a target sound pressure level of 70 dB SPL during brief conversational speech in order to increase vocal respiratory support required for functional communication (to be achieved in 4 weeks)  Long-term goals:  1  Patient will independently increase his vocal loudness to an appropriate level to be understood by others (to be achieved by discharge)  2  Patient will increase self-intelligibility rating by 25% (to be achieved by discharge)      3  Patient will utilize strategies and exercises to increase vocal loudness and improve respiratory laryngeal coordination using the LSVT protocol (to be achieved by discharge)  Impressions/Recommendations    Impressions: Patient presents with a mild voice disorder c/b reduced loudness and monotone pitch, which contributed to an overall decrease in speech intelligibility  During conversation, speech intelligibility is reduced 25% of the time  Based on stimulability testing, patient is an excellent candidate for the LSVT LOUD program  Patient would benefit from skilled speech/voice therapy (LSVT LOUD program) in the outpatient setting 4x/week for 4 weeks (consisting of 16 sessions)  Due to scheduling conflicts, patient will be able to attend ST 3x/week to participate in a "modified LSVT LOUD program"  Prognosis for improvement is good based on motivation, stimulability, and family support  Recommendations:  -Patient would benefit from outpatient skilled Speech Therapy services : LSVT LOUD program    -Frequency: 4x weekly (Due to scheduling conflicts, patient will be able to attend ST 3x/week to participate in a "modified LSVT LOUD program"  )  -Duration: 4-6 weeks    -Intervention certification from: 9/66/9878   -Intervention certification to: 29/02/5187

## 2020-03-31 NOTE — PROGRESS NOTES
PT Evaluation     Today's date: 3/31/2020  Patient name: Chela Shabazz  : 1955  MRN: [id number]  Referring provider: Antonio Samayoa MD  Dx:   Encounter Diagnosis     ICD-10-CM    1  Parkinson's disease ([address] ) [address] Ambulatory referral to Physical Therapy                  Assessment  Impairments: abnormal coordination, abnormal gait, abnormal movement, activity intolerance, impaired balance, lacks appropriate home exercise program and poor posture     Symptom irritability: lowUnderstanding of Dx/Px/POC: good   Prognosis: good    Goals  STG:        Subjective Evaluation    History of Present Illness  Mechanism of injury: Patient repost he was dx with PD about 18 months ago, due to slowness, stiffness, and hoarness of voice  No issue with bathing  Difficulty dressing due to right knee stiffness difficulty with getting pants on, just the right leg doesn't bend enough   Non reciprocal pattern with stairs with rail    Continues to drive  denies issues with UE, denies tremors, no issues with handwriting at thisimt, it has improved with use of medications  (carbadopa, levodopa) started about 1 year ago  After starting medicine doesn't fele like he freezes   Denies numbness or LOS in B/L LE    Walking: per patient better with medications, more steady with stretching  feels the most unsteady when he first gets up or changes positions  Pain  Current pain ratin  At best pain ratin  At worst pain ratin  Location: right knee and right shoulder     Social Support  1  Interior stairs assessed: w/raill non reciprocal - son walks behind him in the morning     15  Lives with: adult children and spouse (supportive)    Employment status: working  Hand dominance: right  Exercise history: doing more since starting meication able to walk a few times per walk, continues to do exercises from PT 6 months ago     Patient Goals  Patient goals for therapy: improved balance  Patient goal: improved endurance        Objective  PT/OT Neuro Exam  Neurologic Exam   Patient Reported Functional Limitations:    Patient Goals: improve endurance     MMT:  Le  Hip flex B/L 5/5  Knee ext B/L3/5  DF B/L 5/5    AROM:    Posture: right lateral trunk lean, increased thoracic kyphosis and forward head    Coordination  Finger to Nose: WNL limited slight on right due to shoulder stiffness ans slight pain  Rapid forearm supination and pronation: WNL  Heel on Shin:WNL limited on right slight due to knee pain and stiffness     Sit to Stand Assessm ent  5xSTS score (time):47 22s w/UE support    Gait Assessments  1  Timed Up and Go (TUG)   TUG Score: 20 97s   TUG Manual Score:    TUG Cognitive Score: 19 22s  2  6 minute walk test score:890ft  3  Gait speed: 20ft/7 72s = 2 5ft/s   Deviations:  4: Functional Gait Assessment (FGA) <22/30 indicates increased risk for falls: 21/30    Hand Function Assessment  9 Hole Peg Test Score:  R 27 22s  L 37 10s    Other  1  Modified Clinical Test of Sensory Interaction on Balance  eyes open firm surface: 30  eyes closed firm surface:30  eyes open foam surface: 30  eyes closed foam surface:30  2  Activities Specific Balance Confidence Scale (ABC) score: Identified Functional Component Movements (5)  1   Sit to Stand  2   3   4   5     Identified Hierarchies (1-3)  1   2   3          Precautions: fall risk, PD       Manual                                                                                   Exercise Diary                                                                                                                                                                                                                                                                                      Modalities

## 2020-04-01 ENCOUNTER — OFFICE VISIT (OUTPATIENT)
Dept: SPEECH THERAPY | Facility: CLINIC | Age: 65
End: 2020-04-01
Payer: MEDICARE

## 2020-04-01 ENCOUNTER — OFFICE VISIT (OUTPATIENT)
Dept: PHYSICAL THERAPY | Facility: CLINIC | Age: 65
End: 2020-04-01
Payer: MEDICARE

## 2020-04-01 DIAGNOSIS — G20 PARKINSON'S DISEASE (HCC): Primary | ICD-10-CM

## 2020-04-01 DIAGNOSIS — G20 PARKINSON'S DISEASE (HCC): ICD-10-CM

## 2020-04-01 DIAGNOSIS — R49.9 UNSPECIFIED VOICE AND RESONANCE DISORDER: Primary | ICD-10-CM

## 2020-04-01 PROCEDURE — 97110 THERAPEUTIC EXERCISES: CPT | Performed by: PHYSICAL THERAPIST

## 2020-04-01 PROCEDURE — 97112 NEUROMUSCULAR REEDUCATION: CPT | Performed by: PHYSICAL THERAPIST

## 2020-04-01 PROCEDURE — 97116 GAIT TRAINING THERAPY: CPT | Performed by: PHYSICAL THERAPIST

## 2020-04-01 PROCEDURE — 92507 TX SP LANG VOICE COMM INDIV: CPT | Performed by: SPEECH-LANGUAGE PATHOLOGIST

## 2020-04-02 ENCOUNTER — OFFICE VISIT (OUTPATIENT)
Dept: SPEECH THERAPY | Facility: CLINIC | Age: 65
End: 2020-04-02
Payer: MEDICARE

## 2020-04-02 ENCOUNTER — OFFICE VISIT (OUTPATIENT)
Dept: PHYSICAL THERAPY | Facility: CLINIC | Age: 65
End: 2020-04-02
Payer: MEDICARE

## 2020-04-02 DIAGNOSIS — R49.9 UNSPECIFIED VOICE AND RESONANCE DISORDER: Primary | ICD-10-CM

## 2020-04-02 DIAGNOSIS — G20 PARKINSON'S DISEASE (HCC): Primary | ICD-10-CM

## 2020-04-02 DIAGNOSIS — G20 PARKINSON'S DISEASE (HCC): ICD-10-CM

## 2020-04-02 PROCEDURE — 97116 GAIT TRAINING THERAPY: CPT | Performed by: PHYSICAL THERAPIST

## 2020-04-02 PROCEDURE — 97110 THERAPEUTIC EXERCISES: CPT | Performed by: PHYSICAL THERAPIST

## 2020-04-02 PROCEDURE — 97112 NEUROMUSCULAR REEDUCATION: CPT | Performed by: PHYSICAL THERAPIST

## 2020-04-02 PROCEDURE — 92507 TX SP LANG VOICE COMM INDIV: CPT | Performed by: SPEECH-LANGUAGE PATHOLOGIST

## 2020-04-06 ENCOUNTER — OFFICE VISIT (OUTPATIENT)
Dept: PHYSICAL THERAPY | Facility: CLINIC | Age: 65
End: 2020-04-06
Payer: MEDICARE

## 2020-04-06 ENCOUNTER — OFFICE VISIT (OUTPATIENT)
Dept: SPEECH THERAPY | Facility: CLINIC | Age: 65
End: 2020-04-06
Payer: MEDICARE

## 2020-04-06 DIAGNOSIS — G20 PARKINSON'S DISEASE (HCC): ICD-10-CM

## 2020-04-06 DIAGNOSIS — G20 PARKINSON'S DISEASE (HCC): Primary | ICD-10-CM

## 2020-04-06 DIAGNOSIS — R49.9 UNSPECIFIED VOICE AND RESONANCE DISORDER: Primary | ICD-10-CM

## 2020-04-06 PROCEDURE — 97116 GAIT TRAINING THERAPY: CPT | Performed by: PHYSICAL THERAPIST

## 2020-04-06 PROCEDURE — 97110 THERAPEUTIC EXERCISES: CPT | Performed by: PHYSICAL THERAPIST

## 2020-04-06 PROCEDURE — 92507 TX SP LANG VOICE COMM INDIV: CPT | Performed by: SPEECH-LANGUAGE PATHOLOGIST

## 2020-04-06 PROCEDURE — 97112 NEUROMUSCULAR REEDUCATION: CPT | Performed by: PHYSICAL THERAPIST

## 2020-04-08 ENCOUNTER — OFFICE VISIT (OUTPATIENT)
Dept: SPEECH THERAPY | Facility: CLINIC | Age: 65
End: 2020-04-08
Payer: MEDICARE

## 2020-04-08 ENCOUNTER — OFFICE VISIT (OUTPATIENT)
Dept: PHYSICAL THERAPY | Facility: CLINIC | Age: 65
End: 2020-04-08
Payer: MEDICARE

## 2020-04-08 DIAGNOSIS — R49.9 UNSPECIFIED VOICE AND RESONANCE DISORDER: Primary | ICD-10-CM

## 2020-04-08 DIAGNOSIS — G20 PARKINSON'S DISEASE (HCC): Primary | ICD-10-CM

## 2020-04-08 DIAGNOSIS — G20 PARKINSON'S DISEASE (HCC): ICD-10-CM

## 2020-04-08 PROCEDURE — 92507 TX SP LANG VOICE COMM INDIV: CPT | Performed by: SPEECH-LANGUAGE PATHOLOGIST

## 2020-04-08 PROCEDURE — 97112 NEUROMUSCULAR REEDUCATION: CPT | Performed by: PHYSICAL THERAPIST

## 2020-04-08 PROCEDURE — 97110 THERAPEUTIC EXERCISES: CPT | Performed by: PHYSICAL THERAPIST

## 2020-04-08 PROCEDURE — 97116 GAIT TRAINING THERAPY: CPT | Performed by: PHYSICAL THERAPIST

## 2020-04-09 ENCOUNTER — OFFICE VISIT (OUTPATIENT)
Dept: SPEECH THERAPY | Facility: CLINIC | Age: 65
End: 2020-04-09
Payer: MEDICARE

## 2020-04-09 ENCOUNTER — OFFICE VISIT (OUTPATIENT)
Dept: PHYSICAL THERAPY | Facility: CLINIC | Age: 65
End: 2020-04-09
Payer: MEDICARE

## 2020-04-09 DIAGNOSIS — R49.9 UNSPECIFIED VOICE AND RESONANCE DISORDER: Primary | ICD-10-CM

## 2020-04-09 DIAGNOSIS — G20 PARKINSON'S DISEASE (HCC): Primary | ICD-10-CM

## 2020-04-09 DIAGNOSIS — G20 PARKINSON'S DISEASE (HCC): ICD-10-CM

## 2020-04-09 PROCEDURE — 97112 NEUROMUSCULAR REEDUCATION: CPT

## 2020-04-09 PROCEDURE — 92507 TX SP LANG VOICE COMM INDIV: CPT | Performed by: SPEECH-LANGUAGE PATHOLOGIST

## 2020-04-09 PROCEDURE — 97116 GAIT TRAINING THERAPY: CPT

## 2020-04-13 ENCOUNTER — OFFICE VISIT (OUTPATIENT)
Dept: PHYSICAL THERAPY | Facility: CLINIC | Age: 65
End: 2020-04-13
Payer: MEDICARE

## 2020-04-13 ENCOUNTER — OFFICE VISIT (OUTPATIENT)
Dept: SPEECH THERAPY | Facility: CLINIC | Age: 65
End: 2020-04-13
Payer: MEDICARE

## 2020-04-13 DIAGNOSIS — R49.9 UNSPECIFIED VOICE AND RESONANCE DISORDER: Primary | ICD-10-CM

## 2020-04-13 DIAGNOSIS — G20 PARKINSON'S DISEASE (HCC): ICD-10-CM

## 2020-04-13 DIAGNOSIS — G20 PARKINSON'S DISEASE (HCC): Primary | ICD-10-CM

## 2020-04-13 PROCEDURE — 97530 THERAPEUTIC ACTIVITIES: CPT | Performed by: PHYSICAL THERAPIST

## 2020-04-13 PROCEDURE — 92507 TX SP LANG VOICE COMM INDIV: CPT

## 2020-04-13 PROCEDURE — 97112 NEUROMUSCULAR REEDUCATION: CPT | Performed by: PHYSICAL THERAPIST

## 2020-04-13 PROCEDURE — 97110 THERAPEUTIC EXERCISES: CPT | Performed by: PHYSICAL THERAPIST

## 2020-04-13 PROCEDURE — 97116 GAIT TRAINING THERAPY: CPT | Performed by: PHYSICAL THERAPIST

## 2020-04-15 ENCOUNTER — OFFICE VISIT (OUTPATIENT)
Dept: PHYSICAL THERAPY | Facility: CLINIC | Age: 65
End: 2020-04-15
Payer: MEDICARE

## 2020-04-15 ENCOUNTER — APPOINTMENT (OUTPATIENT)
Dept: SPEECH THERAPY | Facility: CLINIC | Age: 65
End: 2020-04-15
Payer: MEDICARE

## 2020-04-15 DIAGNOSIS — G20 PARKINSON'S DISEASE (HCC): Primary | ICD-10-CM

## 2020-04-15 PROCEDURE — 97110 THERAPEUTIC EXERCISES: CPT | Performed by: PHYSICAL THERAPIST

## 2020-04-15 PROCEDURE — 97112 NEUROMUSCULAR REEDUCATION: CPT | Performed by: PHYSICAL THERAPIST

## 2020-04-16 ENCOUNTER — OFFICE VISIT (OUTPATIENT)
Dept: SPEECH THERAPY | Facility: CLINIC | Age: 65
End: 2020-04-16
Payer: MEDICARE

## 2020-04-16 ENCOUNTER — OFFICE VISIT (OUTPATIENT)
Dept: PHYSICAL THERAPY | Facility: CLINIC | Age: 65
End: 2020-04-16
Payer: MEDICARE

## 2020-04-16 DIAGNOSIS — R49.9 UNSPECIFIED VOICE AND RESONANCE DISORDER: Primary | ICD-10-CM

## 2020-04-16 DIAGNOSIS — G20 PARKINSON'S DISEASE (HCC): ICD-10-CM

## 2020-04-16 DIAGNOSIS — G20 PARKINSON'S DISEASE (HCC): Primary | ICD-10-CM

## 2020-04-16 PROCEDURE — 97112 NEUROMUSCULAR REEDUCATION: CPT | Performed by: PHYSICAL THERAPIST

## 2020-04-16 PROCEDURE — 97530 THERAPEUTIC ACTIVITIES: CPT | Performed by: PHYSICAL THERAPIST

## 2020-04-16 PROCEDURE — 97110 THERAPEUTIC EXERCISES: CPT | Performed by: PHYSICAL THERAPIST

## 2020-04-16 PROCEDURE — 97116 GAIT TRAINING THERAPY: CPT | Performed by: PHYSICAL THERAPIST

## 2020-04-16 PROCEDURE — 92507 TX SP LANG VOICE COMM INDIV: CPT | Performed by: SPEECH-LANGUAGE PATHOLOGIST

## 2020-04-17 ENCOUNTER — APPOINTMENT (OUTPATIENT)
Dept: SPEECH THERAPY | Facility: CLINIC | Age: 65
End: 2020-04-17
Payer: MEDICARE

## 2020-04-20 ENCOUNTER — OFFICE VISIT (OUTPATIENT)
Dept: SPEECH THERAPY | Facility: CLINIC | Age: 65
End: 2020-04-20
Payer: MEDICARE

## 2020-04-20 ENCOUNTER — OFFICE VISIT (OUTPATIENT)
Dept: PHYSICAL THERAPY | Facility: CLINIC | Age: 65
End: 2020-04-20
Payer: MEDICARE

## 2020-04-20 DIAGNOSIS — R49.9 UNSPECIFIED VOICE AND RESONANCE DISORDER: Primary | ICD-10-CM

## 2020-04-20 DIAGNOSIS — G20 PARKINSON'S DISEASE (HCC): ICD-10-CM

## 2020-04-20 DIAGNOSIS — G20 PARKINSON'S DISEASE (HCC): Primary | ICD-10-CM

## 2020-04-20 PROCEDURE — 92507 TX SP LANG VOICE COMM INDIV: CPT | Performed by: SPEECH-LANGUAGE PATHOLOGIST

## 2020-04-20 PROCEDURE — 97112 NEUROMUSCULAR REEDUCATION: CPT | Performed by: PHYSICAL THERAPIST

## 2020-04-20 PROCEDURE — 97530 THERAPEUTIC ACTIVITIES: CPT | Performed by: PHYSICAL THERAPIST

## 2020-04-22 ENCOUNTER — EVALUATION (OUTPATIENT)
Dept: SPEECH THERAPY | Facility: CLINIC | Age: 65
End: 2020-04-22
Payer: MEDICARE

## 2020-04-22 ENCOUNTER — OFFICE VISIT (OUTPATIENT)
Dept: PHYSICAL THERAPY | Facility: CLINIC | Age: 65
End: 2020-04-22
Payer: MEDICARE

## 2020-04-22 DIAGNOSIS — G20 PARKINSON'S DISEASE (HCC): Primary | ICD-10-CM

## 2020-04-22 DIAGNOSIS — G20 PARKINSON'S DISEASE (HCC): ICD-10-CM

## 2020-04-22 DIAGNOSIS — R49.9 UNSPECIFIED VOICE AND RESONANCE DISORDER: Primary | ICD-10-CM

## 2020-04-22 PROCEDURE — 97112 NEUROMUSCULAR REEDUCATION: CPT | Performed by: PHYSICAL THERAPIST

## 2020-04-22 PROCEDURE — 97116 GAIT TRAINING THERAPY: CPT | Performed by: PHYSICAL THERAPIST

## 2020-04-22 PROCEDURE — 92507 TX SP LANG VOICE COMM INDIV: CPT | Performed by: SPEECH-LANGUAGE PATHOLOGIST

## 2020-04-22 PROCEDURE — 97530 THERAPEUTIC ACTIVITIES: CPT | Performed by: PHYSICAL THERAPIST

## 2020-04-23 ENCOUNTER — OFFICE VISIT (OUTPATIENT)
Dept: PHYSICAL THERAPY | Facility: CLINIC | Age: 65
End: 2020-04-23
Payer: MEDICARE

## 2020-04-23 ENCOUNTER — OFFICE VISIT (OUTPATIENT)
Dept: SPEECH THERAPY | Facility: CLINIC | Age: 65
End: 2020-04-23
Payer: MEDICARE

## 2020-04-23 DIAGNOSIS — G20 PARKINSON'S DISEASE (HCC): Primary | ICD-10-CM

## 2020-04-23 DIAGNOSIS — G20 PARKINSON'S DISEASE (HCC): ICD-10-CM

## 2020-04-23 DIAGNOSIS — R49.9 UNSPECIFIED VOICE AND RESONANCE DISORDER: Primary | ICD-10-CM

## 2020-04-23 PROCEDURE — 97530 THERAPEUTIC ACTIVITIES: CPT | Performed by: PHYSICAL THERAPIST

## 2020-04-23 PROCEDURE — 97112 NEUROMUSCULAR REEDUCATION: CPT | Performed by: PHYSICAL THERAPIST

## 2020-04-23 PROCEDURE — 97110 THERAPEUTIC EXERCISES: CPT | Performed by: PHYSICAL THERAPIST

## 2020-04-23 PROCEDURE — 92507 TX SP LANG VOICE COMM INDIV: CPT | Performed by: SPEECH-LANGUAGE PATHOLOGIST

## 2020-04-23 PROCEDURE — 97116 GAIT TRAINING THERAPY: CPT | Performed by: PHYSICAL THERAPIST

## 2020-04-27 ENCOUNTER — OFFICE VISIT (OUTPATIENT)
Dept: PHYSICAL THERAPY | Facility: CLINIC | Age: 65
End: 2020-04-27
Payer: MEDICARE

## 2020-04-27 ENCOUNTER — OFFICE VISIT (OUTPATIENT)
Dept: SPEECH THERAPY | Facility: CLINIC | Age: 65
End: 2020-04-27
Payer: MEDICARE

## 2020-04-27 DIAGNOSIS — E29.1 MALE HYPOGONADISM: Primary | ICD-10-CM

## 2020-04-27 DIAGNOSIS — G20 PARKINSON'S DISEASE (HCC): ICD-10-CM

## 2020-04-27 DIAGNOSIS — R49.9 UNSPECIFIED VOICE AND RESONANCE DISORDER: Primary | ICD-10-CM

## 2020-04-27 DIAGNOSIS — N52.9 ERECTILE DYSFUNCTION, UNSPECIFIED ERECTILE DYSFUNCTION TYPE: ICD-10-CM

## 2020-04-27 DIAGNOSIS — G20 PARKINSON'S DISEASE (HCC): Primary | ICD-10-CM

## 2020-04-27 PROCEDURE — 97530 THERAPEUTIC ACTIVITIES: CPT | Performed by: PHYSICAL THERAPIST

## 2020-04-27 PROCEDURE — 97112 NEUROMUSCULAR REEDUCATION: CPT | Performed by: PHYSICAL THERAPIST

## 2020-04-27 PROCEDURE — 92507 TX SP LANG VOICE COMM INDIV: CPT | Performed by: SPEECH-LANGUAGE PATHOLOGIST

## 2020-04-28 RX ORDER — VARDENAFIL HYDROCHLORIDE 20 MG/1
TABLET ORAL
Qty: 6 TABLET | Refills: 1 | Status: SHIPPED | OUTPATIENT
Start: 2020-04-28 | End: 2020-08-18 | Stop reason: SDUPTHER

## 2020-04-29 ENCOUNTER — OFFICE VISIT (OUTPATIENT)
Dept: SPEECH THERAPY | Facility: CLINIC | Age: 65
End: 2020-04-29
Payer: MEDICARE

## 2020-04-29 ENCOUNTER — OFFICE VISIT (OUTPATIENT)
Dept: PHYSICAL THERAPY | Facility: CLINIC | Age: 65
End: 2020-04-29
Payer: MEDICARE

## 2020-04-29 DIAGNOSIS — G20 PARKINSON'S DISEASE (HCC): Primary | ICD-10-CM

## 2020-04-29 DIAGNOSIS — R49.9 UNSPECIFIED VOICE AND RESONANCE DISORDER: Primary | ICD-10-CM

## 2020-04-29 DIAGNOSIS — G20 PARKINSON'S DISEASE (HCC): ICD-10-CM

## 2020-04-29 PROCEDURE — 92507 TX SP LANG VOICE COMM INDIV: CPT | Performed by: SPEECH-LANGUAGE PATHOLOGIST

## 2020-04-29 PROCEDURE — 97530 THERAPEUTIC ACTIVITIES: CPT | Performed by: PHYSICAL THERAPIST

## 2020-04-29 PROCEDURE — 97116 GAIT TRAINING THERAPY: CPT | Performed by: PHYSICAL THERAPIST

## 2020-04-29 PROCEDURE — 97110 THERAPEUTIC EXERCISES: CPT | Performed by: PHYSICAL THERAPIST

## 2020-04-30 ENCOUNTER — OFFICE VISIT (OUTPATIENT)
Dept: PHYSICAL THERAPY | Facility: CLINIC | Age: 65
End: 2020-04-30
Payer: MEDICARE

## 2020-04-30 ENCOUNTER — OFFICE VISIT (OUTPATIENT)
Dept: SPEECH THERAPY | Facility: CLINIC | Age: 65
End: 2020-04-30
Payer: MEDICARE

## 2020-04-30 DIAGNOSIS — R49.9 UNSPECIFIED VOICE AND RESONANCE DISORDER: Primary | ICD-10-CM

## 2020-04-30 DIAGNOSIS — G20 PARKINSON'S DISEASE (HCC): Primary | ICD-10-CM

## 2020-04-30 DIAGNOSIS — G20 PARKINSON'S DISEASE (HCC): ICD-10-CM

## 2020-04-30 PROCEDURE — 97112 NEUROMUSCULAR REEDUCATION: CPT | Performed by: PHYSICAL THERAPIST

## 2020-04-30 PROCEDURE — 97116 GAIT TRAINING THERAPY: CPT | Performed by: PHYSICAL THERAPIST

## 2020-04-30 PROCEDURE — 97530 THERAPEUTIC ACTIVITIES: CPT | Performed by: PHYSICAL THERAPIST

## 2020-04-30 PROCEDURE — 92507 TX SP LANG VOICE COMM INDIV: CPT | Performed by: SPEECH-LANGUAGE PATHOLOGIST

## 2020-05-19 ENCOUNTER — LAB REQUISITION (OUTPATIENT)
Dept: LAB | Facility: HOSPITAL | Age: 65
End: 2020-05-19
Payer: MEDICARE

## 2020-05-19 DIAGNOSIS — R05.9 COUGH: ICD-10-CM

## 2020-05-19 DIAGNOSIS — R50.9 FEVER, UNSPECIFIED: ICD-10-CM

## 2020-05-19 DIAGNOSIS — R06.02 SHORTNESS OF BREATH: ICD-10-CM

## 2020-05-19 PROCEDURE — U0003 INFECTIOUS AGENT DETECTION BY NUCLEIC ACID (DNA OR RNA); SEVERE ACUTE RESPIRATORY SYNDROME CORONAVIRUS 2 (SARS-COV-2) (CORONAVIRUS DISEASE [COVID-19]), AMPLIFIED PROBE TECHNIQUE, MAKING USE OF HIGH THROUGHPUT TECHNOLOGIES AS DESCRIBED BY CMS-2020-01-R: HCPCS | Performed by: FAMILY MEDICINE

## 2020-05-20 LAB — SARS-COV-2 RNA RESP QL NAA+PROBE: NEGATIVE

## 2020-06-30 ENCOUNTER — TELEPHONE (OUTPATIENT)
Dept: UROLOGY | Facility: MEDICAL CENTER | Age: 65
End: 2020-06-30

## 2020-06-30 NOTE — TELEPHONE ENCOUNTER
Patient of Dr Thor Aase seen in the [facility] office  Patient called to schedule his Aveed injection which is due around 07/01/2020  Next available that I could find with Emily Bowden was 07/22/2020 advised patient of appointment patient accepted appointment and requested a call from Emily Bowden  Please advise if this is an appropriate timeframe  Please advise

## 2020-06-30 NOTE — TELEPHONE ENCOUNTER
Patient at our office, known for hypogonadism  Patient last office visit 4/22/2020  Patient received Aveed injections every 10 week  Last injection during last visit  Next injection du 7/1/2020  Patient scheduled 7/22/2020 for next injection  Called and spoke to patient at this time, patient states he wants to keep his appt for 7/22/2020 with Trupti Love for his aveed injection, but he also wants  To be scheduled with a provider in the [place] office, as this is the office he would like to go to  I advised I will route the [place] to have them look in to appt time frames to have him scheduled  I did advise patient I am not sure if the Aveed is offered at the [facility] office this may need to be administered at the [facility] office with visits with Trupti Love       Patient verbalizes understanding and would still like to establish care in [place]

## 2020-07-08 ENCOUNTER — OFFICE VISIT (OUTPATIENT)
Dept: UROLOGY | Facility: AMBULATORY SURGERY CENTER | Age: 65
End: 2020-07-08
Payer: MEDICARE

## 2020-07-08 VITALS
TEMPERATURE: 99.8 F | WEIGHT: 218 LBS | HEIGHT: 71 IN | BODY MASS INDEX: 30.52 KG/M2 | DIASTOLIC BLOOD PRESSURE: 72 MMHG | HEART RATE: 80 BPM | SYSTOLIC BLOOD PRESSURE: 128 MMHG

## 2020-07-08 DIAGNOSIS — E29.1 HYPOGONADISM IN MALE: Primary | ICD-10-CM

## 2020-07-08 PROCEDURE — 99214 OFFICE O/P EST MOD 30 MIN: CPT | Performed by: NURSE PRACTITIONER

## 2020-07-08 RX ORDER — TESTOSTERONE 16.2 MG/G
40.5 GEL TRANSDERMAL EVERY MORNING
Qty: 60 ACTUATION | Refills: 3 | Status: SHIPPED | OUTPATIENT
Start: 2020-07-08 | End: 2020-08-20

## 2020-07-08 NOTE — PROGRESS NOTES
7/8/2020      Chief Complaint   Patient presents with    Hypogonadism     AVEED INJECTIONS     Assessment and Plan    77 y o  male previously managed by Dr Kannan Lozoya    1  Hypogonadism  · Repeat testosterone tomorrow  · Prescription for AndroGel provided -2 actuations per day  · Repeat testosterone and CBC in 6 weeks  · PSA performed 02/06/2020 resulted 0 3  · Follow up in the office in 6 weeks    2  Benign prostatic hyperplasia with lower urinary tract symptoms  · Exacerbated by history of Parkinson's  · Bladder scan PVR and urine dip at next office visit    3  Erectile dysfunction  · Managed on Vardenafil 20 mg p r n  History of Present Illness  Dario Juan is a 77 y o  male here for follow up evaluation of  hypogonadism and but urinary symptoms secondary to benign prostatic hyperplasia  Patient was started on testosterone replacement (Aveed) earlier this year and has been doing well with no complaints of side effects  His most recent testosterone level performed 02/06/2020 resulted 193 ng/dL with a free testosterone of 3 4  He presents to the office for alternative means of use of testosterone replacement  Patient also has a history of benign prostatic hyperplasia  He currently has no lower urinary tract symptoms that he finds bothersome at this time  Review of Systems   Constitutional: Negative for chills and fever  Respiratory: Negative for cough and shortness of breath  Cardiovascular: Negative for chest pain  Gastrointestinal: Negative for abdominal distention, abdominal pain, blood in stool, nausea and vomiting  Genitourinary: Negative for difficulty urinating, dysuria, enuresis, flank pain, frequency, hematuria and urgency  Musculoskeletal: Negative for back pain  Skin: Negative for rash  Neurological: Negative for dizziness  Urinary Incontinence Screening      Most Recent Value   Urinary Incontinence   Urinary Incontinence? Yes   Incomplete emptying?   No   Urinary frequency? No   Urinary urgency? No   Urinary hesitancy? No   Dysuria (painful difficult urination)? No   Nocturia (waking up to use the bathroom)? Yes [2-3]   Straining (having to push to go)? No   Weak stream?  No   Intermittent stream?  No   Post void dribbling? Yes          Past Medical History  Past Medical History:   Diagnosis Date    Arthritis     Hypertension     Parkinson's disease ([address] )     2 years ago    Psoriasis        Past Social History  Past Surgical History:   Procedure Laterality Date    APPENDECTOMY      CHOLECYSTECTOMY      COLONOSCOPY      TESTICLE BIOPSY       Social History     Tobacco Use   Smoking Status Former Smoker    Last attempt to quit: Clarise Sessions Years since quittin 5   Smokeless Tobacco Never Used       Past Family History  Family History   Problem Relation Age of Onset    Parkinsonism Mother         [employer] in late 76s, lived into 80s       Prostate cancer Father     Psoriasis Father     Diabetes Maternal Grandmother     Stroke Paternal Grandmother     Diabetes Paternal Grandfather     Parkinsonism Cousin         1st cousin        Past Social history  Social History     Socioeconomic History    Marital status: /Civil Union     Spouse name: Not on file    Number of children: Not on file    Years of education: Not on file    Highest education level: Not on file   Occupational History    Not on file   Social Needs    Financial resource strain: Not on file    Food insecurity:     Worry: Not on file     Inability: Not on file    Transportation needs:     Medical: Not on file     Non-medical: Not on file   Tobacco Use    Smoking status: Former Smoker     Last attempt to quit:      Years since quittin 5    Smokeless tobacco: Never Used   Substance and Sexual Activity    Alcohol use: No     Comment: quit     Drug use: No    Sexual activity: Not Currently   Lifestyle    Physical activity:     Days per week: Not on file Minutes per session: Not on file    Stress: Not on file   Relationships    Social connections:     Talks on phone: Not on file     Gets together: Not on file     Attends Worship service: Not on file     Active member of club or organization: Not on file     Attends meetings of clubs or organizations: Not on file     Relationship status: Not on file    Intimate partner violence:     Fear of current or ex partner: Not on file     Emotionally abused: Not on file     Physically abused: Not on file     Forced sexual activity: Not on file   Other Topics Concern    Not on file   Social History Narrative    Not on file       Current Medications  Current Outpatient Medications   Medication Sig Dispense Refill    carbidopa-levodopa (SINEMET)  mg per tablet Take 1 5 tablets by mouth 3 (three) times a day 405 tablet 3    cholecalciferol (VITAMIN D3) 1,000 units tablet Take 2,000 Units by mouth daily      docusate sodium (COLACE) 100 mg capsule Take 100 mg by mouth daily       Multiple Vitamins-Minerals (MULTIVITAMIN ADULT PO) Take 1 tablet by mouth daily      psyllium (METAMUCIL) 58 6 % packet Take 1 packet by mouth daily      vardenafil (LEVITRA) 20 MG tablet Take 1 tablet by mouth one (1) hour prior to intercourse  Limit to only 2 tablets per week  6 tablet 1    aspirin 81 mg chewable tablet Chew 1 tablet daily      ivermectin (STROMECTOL) 3 MG TABS TK 6 TS PO BID PRN  5    testosterone (ANDROGEL) 1 62 % TD gel pump Apply 2 actuation (40 5 mg total) topically every morning 60 actuation 3     No current facility-administered medications for this visit          Allergies  No Known Allergies      The following portions of the patient's history were reviewed and updated as appropriate: allergies, current medications, past medical history, past social history, past surgical history and problem list       Vitals  Vitals:    07/08/20 1108   BP: 128/72   BP Location: Left arm   Patient Position: Sitting   Cuff Size: Adult   Pulse: 80   Temp: 99 8 °F (37 7 °C)   TempSrc: Temporal   Weight: 98 9 kg (218 lb)   Height: 5' 11" (1 803 m)     Physical Exam  Physical Exam   Constitutional: He is oriented to person, place, and time  He appears well-developed and well-nourished  Pulmonary/Chest: Effort normal  No respiratory distress  Neurological: He is alert and oriented to person, place, and time  Results  No results found for this or any previous visit (from the past 1 hour(s)) ]  Lab Results   Component Value Date    PSA 0 3 02/06/2020    PSA 0 3 01/23/2020    PSA 0 3 01/30/2019     Lab Results   Component Value Date    CALCIUM 9 0 03/19/2020    K 4 8 03/19/2020    CO2 30 03/19/2020     03/19/2020    BUN 17 03/19/2020    CREATININE 0 91 03/19/2020     Lab Results   Component Value Date    WBC 5 60 03/19/2020    HGB 12 1 03/19/2020    HCT 38 9 03/19/2020    MCV 92 03/19/2020     03/19/2020       Orders  Orders Placed This Encounter   Procedures    Testosterone     This is a patient instruction: Fasting preferred  Collections for men not undergoing treatment must be completed between 7am-9am ONLY  Collection time restrictions are not applicable to women or men already undergoing treatment  Standing Status:   Future     Standing Expiration Date:   7/8/2021    CBC     Standing Status:   Future     Standing Expiration Date:   7/8/2021    Testosterone     This is a patient instruction: Fasting preferred  Collections for men not undergoing treatment must be completed between 7am-9am ONLY  Collection time restrictions are not applicable to women or men already undergoing treatment         Standing Status:   Future     Standing Expiration Date:   7/8/2021       SHAWNEE Martinez

## 2020-07-09 ENCOUNTER — APPOINTMENT (OUTPATIENT)
Dept: LAB | Facility: CLINIC | Age: 65
End: 2020-07-09
Payer: MEDICARE

## 2020-07-09 DIAGNOSIS — E29.1 HYPOGONADISM IN MALE: ICD-10-CM

## 2020-07-09 LAB — TESTOST SERPL-MCNC: 370 NG/DL (ref 95–948)

## 2020-07-09 PROCEDURE — 36415 COLL VENOUS BLD VENIPUNCTURE: CPT

## 2020-07-09 PROCEDURE — 84403 ASSAY OF TOTAL TESTOSTERONE: CPT

## 2020-08-10 ENCOUNTER — APPOINTMENT (OUTPATIENT)
Dept: LAB | Facility: CLINIC | Age: 65
End: 2020-08-10
Payer: MEDICARE

## 2020-08-10 ENCOUNTER — TRANSCRIBE ORDERS (OUTPATIENT)
Dept: LAB | Facility: CLINIC | Age: 65
End: 2020-08-10

## 2020-08-10 DIAGNOSIS — L40.9 PSORIASIS: ICD-10-CM

## 2020-08-10 DIAGNOSIS — Z11.59 SCREENING EXAMINATION FOR POLIOMYELITIS: ICD-10-CM

## 2020-08-10 DIAGNOSIS — E55.9 AVITAMINOSIS D: ICD-10-CM

## 2020-08-10 DIAGNOSIS — E55.9 AVITAMINOSIS D: Primary | ICD-10-CM

## 2020-08-10 LAB
25(OH)D3 SERPL-MCNC: 47.8 NG/ML (ref 30–100)
ALBUMIN SERPL BCP-MCNC: 3.8 G/DL (ref 3.5–5)
ALP SERPL-CCNC: 36 U/L (ref 46–116)
ALT SERPL W P-5'-P-CCNC: 9 U/L (ref 12–78)
ANION GAP SERPL CALCULATED.3IONS-SCNC: 4 MMOL/L (ref 4–13)
AST SERPL W P-5'-P-CCNC: 7 U/L (ref 5–45)
BASOPHILS # BLD AUTO: 0.04 THOUSANDS/ΜL (ref 0–0.1)
BASOPHILS NFR BLD AUTO: 1 % (ref 0–1)
BILIRUB SERPL-MCNC: 0.45 MG/DL (ref 0.2–1)
BUN SERPL-MCNC: 17 MG/DL (ref 5–25)
CALCIUM SERPL-MCNC: 8.9 MG/DL (ref 8.3–10.1)
CHLORIDE SERPL-SCNC: 109 MMOL/L (ref 100–108)
CO2 SERPL-SCNC: 29 MMOL/L (ref 21–32)
CREAT SERPL-MCNC: 0.91 MG/DL (ref 0.6–1.3)
EOSINOPHIL # BLD AUTO: 0.12 THOUSAND/ΜL (ref 0–0.61)
EOSINOPHIL NFR BLD AUTO: 2 % (ref 0–6)
ERYTHROCYTE [DISTWIDTH] IN BLOOD BY AUTOMATED COUNT: 13.4 % (ref 11.6–15.1)
GFR SERPL CREATININE-BSD FRML MDRD: 87 ML/MIN/1.73SQ M
GLUCOSE P FAST SERPL-MCNC: 97 MG/DL (ref 65–99)
HCT VFR BLD AUTO: 40.5 % (ref 36.5–49.3)
HGB BLD-MCNC: 12.7 G/DL (ref 12–17)
IMM GRANULOCYTES # BLD AUTO: 0.02 THOUSAND/UL (ref 0–0.2)
IMM GRANULOCYTES NFR BLD AUTO: 0 % (ref 0–2)
LYMPHOCYTES # BLD AUTO: 1.27 THOUSANDS/ΜL (ref 0.6–4.47)
LYMPHOCYTES NFR BLD AUTO: 18 % (ref 14–44)
MCH RBC QN AUTO: 28.9 PG (ref 26.8–34.3)
MCHC RBC AUTO-ENTMCNC: 31.4 G/DL (ref 31.4–37.4)
MCV RBC AUTO: 92 FL (ref 82–98)
MONOCYTES # BLD AUTO: 0.7 THOUSAND/ΜL (ref 0.17–1.22)
MONOCYTES NFR BLD AUTO: 10 % (ref 4–12)
NEUTROPHILS # BLD AUTO: 4.81 THOUSANDS/ΜL (ref 1.85–7.62)
NEUTS SEG NFR BLD AUTO: 69 % (ref 43–75)
NRBC BLD AUTO-RTO: 0 /100 WBCS
PLATELET # BLD AUTO: 135 THOUSANDS/UL (ref 149–390)
PMV BLD AUTO: 10.7 FL (ref 8.9–12.7)
POTASSIUM SERPL-SCNC: 4.4 MMOL/L (ref 3.5–5.3)
PROT SERPL-MCNC: 7.5 G/DL (ref 6.4–8.2)
RBC # BLD AUTO: 4.39 MILLION/UL (ref 3.88–5.62)
SARS-COV-2 IGG+IGM SERPL QL IA: NORMAL
SODIUM SERPL-SCNC: 142 MMOL/L (ref 136–145)
WBC # BLD AUTO: 6.96 THOUSAND/UL (ref 4.31–10.16)

## 2020-08-10 PROCEDURE — 85025 COMPLETE CBC W/AUTO DIFF WBC: CPT

## 2020-08-10 PROCEDURE — 86769 SARS-COV-2 COVID-19 ANTIBODY: CPT

## 2020-08-10 PROCEDURE — 82306 VITAMIN D 25 HYDROXY: CPT

## 2020-08-10 PROCEDURE — 36415 COLL VENOUS BLD VENIPUNCTURE: CPT

## 2020-08-10 PROCEDURE — 80053 COMPREHEN METABOLIC PANEL: CPT

## 2020-08-12 ENCOUNTER — APPOINTMENT (OUTPATIENT)
Dept: LAB | Facility: CLINIC | Age: 65
End: 2020-08-12
Payer: MEDICARE

## 2020-08-12 ENCOUNTER — TELEPHONE (OUTPATIENT)
Dept: NEUROLOGY | Facility: CLINIC | Age: 65
End: 2020-08-12

## 2020-08-12 DIAGNOSIS — E29.1 HYPOGONADISM IN MALE: ICD-10-CM

## 2020-08-12 LAB
ERYTHROCYTE [DISTWIDTH] IN BLOOD BY AUTOMATED COUNT: 13.4 % (ref 11.6–15.1)
HCT VFR BLD AUTO: 41 % (ref 36.5–49.3)
HGB BLD-MCNC: 12.5 G/DL (ref 12–17)
MCH RBC QN AUTO: 28.5 PG (ref 26.8–34.3)
MCHC RBC AUTO-ENTMCNC: 30.5 G/DL (ref 31.4–37.4)
MCV RBC AUTO: 93 FL (ref 82–98)
PLATELET # BLD AUTO: 134 THOUSANDS/UL (ref 149–390)
PMV BLD AUTO: 10.9 FL (ref 8.9–12.7)
RBC # BLD AUTO: 4.39 MILLION/UL (ref 3.88–5.62)
TESTOST SERPL-MCNC: 854 NG/DL (ref 95–948)
WBC # BLD AUTO: 6.53 THOUSAND/UL (ref 4.31–10.16)

## 2020-08-12 PROCEDURE — 36415 COLL VENOUS BLD VENIPUNCTURE: CPT

## 2020-08-12 PROCEDURE — 84403 ASSAY OF TOTAL TESTOSTERONE: CPT

## 2020-08-12 PROCEDURE — 85027 COMPLETE CBC AUTOMATED: CPT

## 2020-08-12 NOTE — TELEPHONE ENCOUNTER
pt called and states that his life insurance told him that we told them that pt needs to sign an additional form for records  that their standard form was not enough  i made him aware that i do not see any notation that a reecords request was recevied form them  i made him aware that he could come to the office to sign a AMANDA allowing us to release info tho them  He will contact life insurance company    I did give him our fax number

## 2020-08-18 ENCOUNTER — TELEPHONE (OUTPATIENT)
Dept: UROLOGY | Facility: AMBULATORY SURGERY CENTER | Age: 65
End: 2020-08-18

## 2020-08-18 ENCOUNTER — OFFICE VISIT (OUTPATIENT)
Dept: UROLOGY | Facility: AMBULATORY SURGERY CENTER | Age: 65
End: 2020-08-18
Payer: MEDICARE

## 2020-08-18 ENCOUNTER — TELEPHONE (OUTPATIENT)
Dept: UROLOGY | Facility: MEDICAL CENTER | Age: 65
End: 2020-08-18

## 2020-08-18 VITALS
TEMPERATURE: 97.5 F | HEART RATE: 64 BPM | DIASTOLIC BLOOD PRESSURE: 88 MMHG | BODY MASS INDEX: 30.38 KG/M2 | SYSTOLIC BLOOD PRESSURE: 140 MMHG | WEIGHT: 217 LBS | HEIGHT: 71 IN

## 2020-08-18 DIAGNOSIS — N52.9 ERECTILE DYSFUNCTION, UNSPECIFIED ERECTILE DYSFUNCTION TYPE: ICD-10-CM

## 2020-08-18 DIAGNOSIS — E29.1 HYPOGONADISM IN MALE: Primary | ICD-10-CM

## 2020-08-18 DIAGNOSIS — Z12.5 PROSTATE CANCER SCREENING: ICD-10-CM

## 2020-08-18 DIAGNOSIS — E29.1 MALE HYPOGONADISM: ICD-10-CM

## 2020-08-18 DIAGNOSIS — E29.1 HYPOGONADISM IN MALE: ICD-10-CM

## 2020-08-18 DIAGNOSIS — N40.1 BENIGN PROSTATIC HYPERPLASIA WITH NOCTURIA: Primary | ICD-10-CM

## 2020-08-18 DIAGNOSIS — R35.1 BENIGN PROSTATIC HYPERPLASIA WITH NOCTURIA: Primary | ICD-10-CM

## 2020-08-18 LAB
POST-VOID RESIDUAL VOLUME, ML POC: 45 ML
SL AMB  POCT GLUCOSE, UA: NORMAL
SL AMB LEUKOCYTE ESTERASE,UA: NORMAL
SL AMB POCT BILIRUBIN,UA: NORMAL
SL AMB POCT BLOOD,UA: NORMAL
SL AMB POCT CLARITY,UA: CLEAR
SL AMB POCT COLOR,UA: YELLOW
SL AMB POCT KETONES,UA: NORMAL
SL AMB POCT NITRITE,UA: NORMAL
SL AMB POCT PH,UA: 6.5
SL AMB POCT SPECIFIC GRAVITY,UA: 1.01
SL AMB POCT URINE PROTEIN: NORMAL
SL AMB POCT UROBILINOGEN: NORMAL

## 2020-08-18 PROCEDURE — 99213 OFFICE O/P EST LOW 20 MIN: CPT | Performed by: NURSE PRACTITIONER

## 2020-08-18 PROCEDURE — 81002 URINALYSIS NONAUTO W/O SCOPE: CPT | Performed by: NURSE PRACTITIONER

## 2020-08-18 PROCEDURE — 51798 US URINE CAPACITY MEASURE: CPT | Performed by: NURSE PRACTITIONER

## 2020-08-18 RX ORDER — VARDENAFIL HYDROCHLORIDE 20 MG/1
TABLET ORAL
Qty: 6 TABLET | Refills: 1 | Status: SHIPPED | OUTPATIENT
Start: 2020-08-18 | End: 2021-04-01 | Stop reason: SDUPTHER

## 2020-08-18 RX ORDER — MELOXICAM 15 MG/1
15 TABLET ORAL DAILY PRN
COMMUNITY
Start: 2020-08-14 | End: 2021-09-02

## 2020-08-18 NOTE — TELEPHONE ENCOUNTER
I left a message for the patient to call me directly to discuss same  I left my direct dial number for ease in communication

## 2020-08-18 NOTE — PROGRESS NOTES
8/18/2020      Chief Complaint   Patient presents with    Benign Prostatic Hypertrophy    Erectile Dysfunction    Hypogonadism    Prostate cancer screening     Assessment and Plan    77 y o  male managed by Dr Satya Thurston  1  Hypogonadism  · Testosterone level performed 08/12/2020 resulted 854  · CBC unremarkable  · PSA performed 01/23/2020 resulted 0 3  · Repeat CBC, testosterone level in 6 months  · Continue testosterone at 2 actuation is per day    2  Benign prostatic hyperplasia with lower urinary tract symptoms  · Exacerbated by history of Parkinson's  · Bladder scan PVR 45 ml  · Urine dip trace leukocytes, otherwise unremarkable    3  Routine prostate cancer screening  · PSA as above  · ANAYA to be performed at next office visit 2/2021  · Repeat PSA and ANAYA in 1 year    4  Erectile dysfunction  · Managed on vardenafil 20 mg p r n  History of Present Illness  Jefry Malhotra is a 77 y o  male here for follow up evaluation of    hypogonadism and but urinary symptoms secondary to benign prostatic hyperplasia  Patient was started on testosterone replacement (Aveed) earlier this year and has been doing well with no complaints of side effects  His most recent testosterone level performed 02/06/2020 resulted 193 ng/dL with a free testosterone of 3 4  He presents to the office for alternative means of use of testosterone replacement  Patient also has a history of benign prostatic hyperplasia  He currently has no lower urinary tract symptoms that he finds bothersome at this time  Patient reports that given the fact that he needs to pay out-of-pocket for testosterone replacement therapy using AndroGel he wishes to explore other options for therapy  He he currently reports pain approximately 80 dollars a month for the medication and is not satisfied with cost given that the AVEED was at no cost to him  Review of Systems   Constitutional: Negative for chills and fever     Respiratory: Negative for cough and shortness of breath  Cardiovascular: Negative for chest pain  Gastrointestinal: Negative for abdominal distention, abdominal pain, blood in stool, nausea and vomiting  Genitourinary: Negative for difficulty urinating, dysuria, enuresis, flank pain, frequency, hematuria and urgency  Musculoskeletal: Negative for back pain  Skin: Negative for rash  Neurological: Negative for dizziness  Past Medical History  Past Medical History:   Diagnosis Date    Arthritis     Hypertension     Parkinson's disease ([address] )     2 years ago    Psoriasis        Past Social History  Past Surgical History:   Procedure Laterality Date    APPENDECTOMY      CHOLECYSTECTOMY      COLONOSCOPY      TESTICLE BIOPSY       Social History     Tobacco Use   Smoking Status Former Smoker    Last attempt to quit:  [facility] Years since quittin 6   Smokeless Tobacco Never Used       Past Family History  Family History   Problem Relation Age of Onset    Parkinsonism Mother         [employer] in late 76s, lived into 80s       Prostate cancer Father     Psoriasis Father     Diabetes Maternal Grandmother     Stroke Paternal Grandmother     Diabetes Paternal Grandfather     Parkinsonism Cousin         1st cousin        Past Social history  Social History     Socioeconomic History    Marital status: /Civil Union     Spouse name: Not on file    Number of children: Not on file    Years of education: Not on file    Highest education level: Not on file   Occupational History    Not on file   Social Needs    Financial resource strain: Not on file    Food insecurity     Worry: Not on file     Inability: Not on file   [employer] needs     Medical: Not on file     Non-medical: Not on file   Tobacco Use    Smoking status: Former Smoker     Last attempt to quit:      Years since quittin 6    Smokeless tobacco: Never Used   Substance and Sexual Activity    Alcohol use: No     Comment: quit     Drug use: No    Sexual activity: Not Currently   Lifestyle    Physical activity     Days per week: Not on file     Minutes per session: Not on file    Stress: Not on file   Relationships    Social connections     Talks on phone: Not on file     Gets together: Not on file     Attends [employer] service: Not on file     Active member of club or organization: Not on file     Attends meetings of clubs or organizations: Not on file     Relationship status: Not on file    Intimate partner violence     Fear of current or ex partner: Not on file     Emotionally abused: Not on file     Physically abused: Not on file     Forced sexual activity: Not on file   Other Topics Concern    Not on file   Social History Narrative    Not on file       Current Medications  Current Outpatient Medications   Medication Sig Dispense Refill    aspirin 81 mg chewable tablet Chew 1 tablet daily      carbidopa-levodopa (SINEMET)  mg per tablet Take 1 5 tablets by mouth 3 (three) times a day 405 tablet 3    cholecalciferol (VITAMIN D3) 1,000 units tablet Take 2,000 Units by mouth daily      docusate sodium (COLACE) 100 mg capsule Take 100 mg by mouth daily       ivermectin (STROMECTOL) 3 MG TABS TK 6 TS PO BID PRN  5    meloxicam (MOBIC) 15 mg tablet       Multiple Vitamins-Minerals (MULTIVITAMIN ADULT PO) Take 1 tablet by mouth daily      psyllium (METAMUCIL) 58 6 % packet Take 1 packet by mouth daily      vardenafil (LEVITRA) 20 MG tablet Take 1 tablet by mouth one (1) hour prior to intercourse  Limit to only 2 tablets per week  6 tablet 1    testosterone (ANDROGEL) 1 62 % TD gel pump Apply 2 actuation (40 5 mg total) topically every morning 60 actuation 3     No current facility-administered medications for this visit          Allergies  No Known Allergies      The following portions of the patient's history were reviewed and updated as appropriate: allergies, current medications, past medical history, past social history, past surgical history and problem list       Vitals  Vitals:    08/18/20 0809   BP: 140/88   Pulse: 64   Temp: 97 5 °F (36 4 °C)   Weight: 98 4 kg (217 lb)   Height: 5' 11" (1 803 m)       Physical Exam  Physical Exam  Vitals signs reviewed  Constitutional:       Appearance: Normal appearance  Cardiovascular:      Rate and Rhythm: Normal rate  Heart sounds: Normal heart sounds  No murmur  Pulmonary:      Effort: Pulmonary effort is normal  No respiratory distress  Breath sounds: Normal breath sounds  Musculoskeletal: Normal range of motion  Skin:     General: Skin is warm and dry  Neurological:      Mental Status: He is alert  Mental status is at baseline  Psychiatric:         Mood and Affect: Mood normal          Behavior: Behavior normal        Results  Recent Results (from the past 1 hour(s))   POCT Measure PVR    Collection Time: 08/18/20  8:14 AM   Result Value Ref Range    POST-VOID RESIDUAL VOLUME, ML POC 45 mL   POCT urine dip    Collection Time: 08/18/20  8:14 AM   Result Value Ref Range    LEUKOCYTE ESTERASE,UA trace     NITRITE,UA -     SL AMB POCT UROBILINOGEN -     POCT URINE PROTEIN +      PH,UA 6 5     BLOOD,UA -     SPECIFIC GRAVITY,UA 1 015     KETONES,UA t     BILIRUBIN,UA -     GLUCOSE, UA -      COLOR,UA yellow     CLARITY,UA clear    ]  Lab Results   Component Value Date    PSA 0 3 02/06/2020    PSA 0 3 01/23/2020    PSA 0 3 01/30/2019     Lab Results   Component Value Date    CALCIUM 8 9 08/10/2020    K 4 4 08/10/2020    CO2 29 08/10/2020     (H) 08/10/2020    BUN 17 08/10/2020    CREATININE 0 91 08/10/2020     Lab Results   Component Value Date    WBC 6 53 08/12/2020    HGB 12 5 08/12/2020    HCT 41 0 08/12/2020    MCV 93 08/12/2020     (L) 08/12/2020     Orders  Orders Placed This Encounter   Procedures    Testosterone     This is a patient instruction: Fasting preferred  Collections for men not undergoing treatment must be completed between 7am-9am ONLY  Collection time restrictions are not applicable to women or men already undergoing treatment  Standing Status:   Future     Standing Expiration Date:   8/18/2021    CBC     Standing Status:   Future     Standing Expiration Date:   8/18/2021    PSA, Total Screen     This is a patient instruction: This test is non-fasting  Please drink two glasses of water morning of bloodwork          Standing Status:   Future     Standing Expiration Date:   2/18/2022    POCT Measure PVR    POCT urine dip       SHAWNEE Price

## 2020-08-18 NOTE — TELEPHONE ENCOUNTER
----- Message from Carlos Shipman  sent at 8/18/2020  8:38 AM EDT -----  Pt requesting alternative cost saving options to topical Androgel  Are you able to contact him to discuss? Thank you

## 2020-08-18 NOTE — Clinical Note
Pt requesting alternative cost saving options to topical Androgel  Are you able to contact him to discuss? Thank you

## 2020-08-19 NOTE — TELEPHONE ENCOUNTER
Patient calling in asking if we received his records request  Reviewed chart and do not see anything on file   Patient states he will call medical records and if he needs to, he will come to [place] office to sign AMANDA

## 2020-08-19 NOTE — TELEPHONE ENCOUNTER
Patient called back and is asking for injectable testosterone due to finance  Please call patient back at [phone]  There also is another encounter for same date for the patient for similar issue

## 2020-08-20 RX ORDER — TESTOSTERONE CYPIONATE 200 MG/ML
INJECTION INTRAMUSCULAR
Qty: 2 ML | Refills: 5 | Status: SHIPPED | OUTPATIENT
Start: 2020-08-20 | End: 2021-04-01 | Stop reason: SDUPTHER

## 2020-08-20 NOTE — TELEPHONE ENCOUNTER
Internal Medicine Rufino Lee, Is this something you can help with? Ramya Morse is not back until Monday  Neurology

## 2020-08-24 NOTE — TELEPHONE ENCOUNTER
Patient called in regard to need for instructions with injectable Testosterone  He did pick the medication up   Patient reported that the mornings are best: Tuesday, Wednesday and Thursday    Please call at [phone]  Thank you

## 2020-09-01 ENCOUNTER — PROCEDURE VISIT (OUTPATIENT)
Dept: UROLOGY | Facility: AMBULATORY SURGERY CENTER | Age: 65
End: 2020-09-01
Payer: MEDICARE

## 2020-09-01 VITALS
SYSTOLIC BLOOD PRESSURE: 158 MMHG | HEART RATE: 72 BPM | DIASTOLIC BLOOD PRESSURE: 88 MMHG | BODY MASS INDEX: 29.54 KG/M2 | WEIGHT: 211 LBS | HEIGHT: 71 IN | TEMPERATURE: 97.3 F

## 2020-09-01 DIAGNOSIS — E29.1 HYPOGONADISM IN MALE: Primary | ICD-10-CM

## 2020-09-01 PROCEDURE — 99211 OFF/OP EST MAY X REQ PHY/QHP: CPT

## 2020-09-01 RX ORDER — SYRINGE W-NEEDLE,DISPOSAB,3 ML 25GX5/8"
SYRINGE, EMPTY DISPOSABLE MISCELLANEOUS WEEKLY
Qty: 10 EACH | Refills: 3 | Status: SHIPPED | OUTPATIENT
Start: 2020-09-01 | End: 2022-06-02

## 2020-09-01 NOTE — TELEPHONE ENCOUNTER
Patient coming in today for injection teaching  He will need a script for syringes and needles  Please order

## 2020-09-01 NOTE — PATIENT INSTRUCTIONS
· Keep injections timed the same day of the week and the same time of the day  · Get repeat blood work half way between doses, first thing in the morning  · Your dose is 100 mg per week (0 5 mL every 7 days)  · Ask your pharmacist about appropriate needle disposal options

## 2020-09-01 NOTE — PROGRESS NOTES
9/1/2020  Tita Perez is a 77 y o  male  [id number]    Diagnosis:  Chief Complaint     Hypogonadism        Patient presents for testosterone self injection teaching managed by our office    Plan:   Follow up in 6 week with CBC and testosterone, patient to call for blood work results and adjustment of dosing if necessary   He asked about 90 day supply, I encouraged him to await FU blood work to ensure dosing did not need adjusted prior to requesting 90 day supply   Briefly discussed using a fill needle and changing needles to inject, patient to call if interested in [address] fill needle   FU as scheduled 2/2020   Patient will inject 100 mg of testosterone cypionate every 7 days   Time injections consistently the same day of the week and the same time of day   Repeat blood work should be completed half way between injections, first thing in the morning   Call the office with questions or concerns      Vitals:    09/01/20 0907   BP: 158/88   Pulse: 72   Temp: (!) 97 3 °F (36 3 °C)       Teaching:    Reviewed proper injection technique including gathering supplies, preparing and drawing up the correct dosage of the correct medication, and correct Intramuscular injection technique  Written instructions provided  Under nursing supervision patient was able to draw up the appropriate dose of medication from the vial  Patient was then able self administer the injection of 0 5  mL (100mg) of Testosterone Cypionate in the Right vastus lateralis with appropriate technique  Patient expressed confidence in his ability to perform self injections moving forward       GLEN Riley BSN

## 2020-09-15 ENCOUNTER — TELEPHONE (OUTPATIENT)
Dept: NEUROLOGY | Facility: CLINIC | Age: 65
End: 2020-09-15

## 2020-09-15 DIAGNOSIS — E29.1 HYPOGONADISM IN MALE: ICD-10-CM

## 2020-09-15 NOTE — TELEPHONE ENCOUNTER
Pt left voicemail on neurology line for [facility] with urology  He is asking that a 90 day supply of testosterone is sent to  [facility] pharmacy on file  He is asking that his previous script does not get discontinued as he is just trying to get 90 day supply pricing  [facility] will not give pricing without a script written  Pt is also asking for a testosterone level to be ordered       Callback# [phone]

## 2020-09-15 NOTE — TELEPHONE ENCOUNTER
Geovany:  Please see message from Dr Daphnie Davis  I'm not sure what he's requesting  The current script covers him for more than 90 days, not sure why we wants 90 days of dosing when he will have less medication than  Already prescribed  CBC and testosterone level ordered to be performed in 2 weeks

## 2020-09-21 ENCOUNTER — TELEPHONE (OUTPATIENT)
Dept: UROLOGY | Facility: AMBULATORY SURGERY CENTER | Age: 65
End: 2020-09-21

## 2020-09-21 ENCOUNTER — APPOINTMENT (OUTPATIENT)
Dept: LAB | Facility: CLINIC | Age: 65
End: 2020-09-21
Payer: MEDICARE

## 2020-09-21 ENCOUNTER — OFFICE VISIT (OUTPATIENT)
Dept: NEUROLOGY | Facility: CLINIC | Age: 65
End: 2020-09-21
Payer: MEDICARE

## 2020-09-21 VITALS
HEART RATE: 63 BPM | BODY MASS INDEX: 30.24 KG/M2 | TEMPERATURE: 98.1 F | HEIGHT: 71 IN | SYSTOLIC BLOOD PRESSURE: 163 MMHG | WEIGHT: 216 LBS | DIASTOLIC BLOOD PRESSURE: 85 MMHG

## 2020-09-21 DIAGNOSIS — E29.1 HYPOGONADISM IN MALE: ICD-10-CM

## 2020-09-21 DIAGNOSIS — G20 PARKINSON'S DISEASE (HCC): Primary | ICD-10-CM

## 2020-09-21 DIAGNOSIS — E29.1 HYPOGONADISM IN MALE: Primary | ICD-10-CM

## 2020-09-21 LAB
BASOPHILS # BLD AUTO: 0.05 THOUSANDS/ΜL (ref 0–0.1)
BASOPHILS NFR BLD AUTO: 1 % (ref 0–1)
EOSINOPHIL # BLD AUTO: 0.1 THOUSAND/ΜL (ref 0–0.61)
EOSINOPHIL NFR BLD AUTO: 1 % (ref 0–6)
ERYTHROCYTE [DISTWIDTH] IN BLOOD BY AUTOMATED COUNT: 14.6 % (ref 11.6–15.1)
HCT VFR BLD AUTO: 45.5 % (ref 36.5–49.3)
HGB BLD-MCNC: 14.5 G/DL (ref 12–17)
IMM GRANULOCYTES # BLD AUTO: 0.02 THOUSAND/UL (ref 0–0.2)
IMM GRANULOCYTES NFR BLD AUTO: 0 % (ref 0–2)
LYMPHOCYTES # BLD AUTO: 1.24 THOUSANDS/ΜL (ref 0.6–4.47)
LYMPHOCYTES NFR BLD AUTO: 18 % (ref 14–44)
MCH RBC QN AUTO: 29.1 PG (ref 26.8–34.3)
MCHC RBC AUTO-ENTMCNC: 31.9 G/DL (ref 31.4–37.4)
MCV RBC AUTO: 91 FL (ref 82–98)
MONOCYTES # BLD AUTO: 0.8 THOUSAND/ΜL (ref 0.17–1.22)
MONOCYTES NFR BLD AUTO: 11 % (ref 4–12)
NEUTROPHILS # BLD AUTO: 4.84 THOUSANDS/ΜL (ref 1.85–7.62)
NEUTS SEG NFR BLD AUTO: 69 % (ref 43–75)
NRBC BLD AUTO-RTO: 0 /100 WBCS
PLATELET # BLD AUTO: 156 THOUSANDS/UL (ref 149–390)
PMV BLD AUTO: 10.4 FL (ref 8.9–12.7)
RBC # BLD AUTO: 4.99 MILLION/UL (ref 3.88–5.62)
TESTOST SERPL-MCNC: 1727 NG/DL (ref 95–948)
WBC # BLD AUTO: 7.05 THOUSAND/UL (ref 4.31–10.16)

## 2020-09-21 PROCEDURE — 99214 OFFICE O/P EST MOD 30 MIN: CPT | Performed by: PSYCHIATRY & NEUROLOGY

## 2020-09-21 PROCEDURE — 84403 ASSAY OF TOTAL TESTOSTERONE: CPT

## 2020-09-21 PROCEDURE — 36415 COLL VENOUS BLD VENIPUNCTURE: CPT

## 2020-09-21 PROCEDURE — 85025 COMPLETE CBC W/AUTO DIFF WBC: CPT

## 2020-09-21 NOTE — TELEPHONE ENCOUNTER
Patient will need to adjust dosing of testosterone cypionate to 100 mg to every 14 days with re-evaluation of CBC and testosterone level in 6 weeks  His most recent testosterone level resulted today at1,727 ng/dL  CBC unremarkable  If patient is not agreeable to every 14 day dosing he will need to have his hypogonadism treated with exogenous testosterone by Endocrinology from this point forward

## 2020-09-21 NOTE — ASSESSMENT & PLAN NOTE
68-year-old man presents in follow-up for levodopa responsive Parkinson's disease  Starting to have some early wearing of Sinemet but still does well 5 hours  We discussed different options regarding medication management and agreed to add a 4th dose of the same 1 5 tabs    New timin 11 4 9     We discussed plan be which could consist of adding either Azilect, Comtan or long acting dopamine agonist      Follow-up in 4 months

## 2020-09-21 NOTE — PROGRESS NOTES
Patient ID: Barbara Lisa is a 77 y o  male  Assessment/Plan:    Parkinson's disease [facility])  17-year-old man presents in follow-up for levodopa responsive Parkinson's disease  Starting to have some early wearing of Sinemet but still does well 5 hours  We discussed different options regarding medication management and agreed to add a 4th dose of the same 1 5 tabs  New timin 11 4 9     We discussed plan be which could consist of adding either Azilect, Comtan or long acting dopamine agonist      Follow-up in 4 months       Diagnoses and all orders for this visit:    Parkinson's disease ([facility] [address] )           Subjective:    HPI    I had the pleasure of seeing your patient, Barbara Lisa in the Movement Disorders Clinic at the [facility]          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 who presents today in follow up for levodopa-responsive Parkinsons disease, symptom onset in 2017 (60yo) with gait dysfunction which consists of stooped posture, balance troubles and slowed walking speed  On presentation he had fairly symmetric parkinsonism with moderate akinesia, mild symmetric rigidity in the bilateral upper and lower extremities, slight to mild focal bradykinesia bilaterally and clear gait dysfunction with absent arm swing bilaterally in a trunk lean to the right  The patient has no tremor on exam  Positive pull test  Rare dream reenactment       MRI brain, PTH and Jimenezs labs were sent given his relatively young age at onset  His MRI brain showed mild white matter disease  Ceruloplasmin was within normal limits  24 hour urine copper was elevated at 53 (upper limit of normal is 35)  Repeat 24 hour urine was within normal limits       Current medications and timing:  Sinemet 25/100 1 5 tab TID @ 6, 11:30, 5     Interval history:  Saw some improvement with PT  Morning dose dosen't last as long  About 5 hours     A little stiff first thing in the AM    Overall seems to be doing well  Keeping up at work well  Can have worse gait trouble in the middle of the night sometimes - rare     Bed time is around 10pm    The following portions of the patient's history were reviewed and updated as appropriate: allergies, current medications, past family history, past medical history, past social history, past surgical history and problem list          Objective:    Blood pressure 163/85, pulse 63, temperature 98 1 °F (36 7 °C), height 5' 11" (1 803 m), weight 98 kg (216 lb)  Physical Exam    Neurological Exam     UPDRS motor:                              Time since last dose:       Speech  2     Facial Expression  +     Rigidity - Neck  1     Rigidity - Upper Extremity (Right)  2     Rigidity - Upper Extremity (Left)   2     Rigidity - Lower Extremity (Right)  2     Rigidity - Lower Extremity (Left)   2     Finger Taps (Right)   2     Finger Taps (Left)   2     Hand Movement (Right)  2     Hand Movement (Left)   2     Pronation/Supination (Right)  2     Pronation/Supination (Left)   1     Toe Tapping (Right) 1     Toe Tapping (Left) 1     Leg Agility (Right)  0     Leg Agility (Left)   0     Arising from Chair   1     Gait   2     Freezing of Gait 0     Postural Stability        Posture 3     Global spontaneity of movement 3     Postural Tremor (Right) 0     Postural Tremor (Left) 0     Kinetic Tremor (Right)  0     Kinetic Tremor (Left)  0     Rest tremor amplitude RUE 0     Rest tremor amplitude LUE 0     Rest tremor amplitude RLE 0     Reset tremor amplitude LLE 0     Lip/Jaw Tremor  0     Consistency of tremor 0     Motor Exam Total:          Dysmetria: none on FNF  Dyskinesia: right arm when walking   Dystonia: none     Stance: narrow-based and stable   Stride: normal speed, trunk lean to the right  Some dystonic posturing of the right arm when walking  A little unstable with the first few steps but then stable throughout         ROS:  Review of Systems Constitutional: Negative  Negative for appetite change and fever  HENT: Negative  Negative for hearing loss, tinnitus, trouble swallowing and voice change  Eyes: Negative  Negative for photophobia and pain  Respiratory: Negative  Negative for shortness of breath  Cardiovascular: Negative  Negative for palpitations  Gastrointestinal: Negative  Negative for nausea and vomiting  Endocrine: Negative  Negative for cold intolerance  Genitourinary: Negative  Negative for dysuria, frequency and urgency  Musculoskeletal: Negative  Negative for myalgias and neck pain  Skin: Negative  Negative for rash  Neurological: Negative  Negative for dizziness, tremors, seizures, syncope, facial asymmetry, speech difficulty, weakness, light-headedness, numbness and headaches  Hematological: Negative  Does not bruise/bleed easily  Psychiatric/Behavioral: Negative  Negative for confusion, hallucinations and sleep disturbance  The above ROS was reviewed and updated       Wolf Benavidez MD  Medical Director   [facility]  Movement and Memory Specialist

## 2020-09-21 NOTE — TELEPHONE ENCOUNTER
Call placed to patient and spoke with him  Informed him of the recommendations of DEVONNP at this time  Pt is aware and was agreeable to these recommendations at this time  He will  Have his lab testing done as ordered as well

## 2020-09-21 NOTE — PATIENT INSTRUCTIONS
The medication we could consider for next time include:   Azilect (MAOb inhibitor) once daily   Comtan (COMT inhibitor) one with each sinemet dose   Pramipexole ER (dopamine agonist) once daily     New sinemet timin 11 4 9     -  Parkinsons  [facility]/exercise_videos  html

## 2020-10-27 ENCOUNTER — LAB (OUTPATIENT)
Dept: LAB | Facility: CLINIC | Age: 65
End: 2020-10-27
Payer: MEDICARE

## 2020-10-27 DIAGNOSIS — E29.1 HYPOGONADISM IN MALE: ICD-10-CM

## 2020-10-27 LAB
ERYTHROCYTE [DISTWIDTH] IN BLOOD BY AUTOMATED COUNT: 14.5 % (ref 11.6–15.1)
HCT VFR BLD AUTO: 42 % (ref 36.5–49.3)
HGB BLD-MCNC: 13.2 G/DL (ref 12–17)
MCH RBC QN AUTO: 29.1 PG (ref 26.8–34.3)
MCHC RBC AUTO-ENTMCNC: 31.4 G/DL (ref 31.4–37.4)
MCV RBC AUTO: 93 FL (ref 82–98)
PLATELET # BLD AUTO: 161 THOUSANDS/UL (ref 149–390)
PMV BLD AUTO: 10.9 FL (ref 8.9–12.7)
RBC # BLD AUTO: 4.53 MILLION/UL (ref 3.88–5.62)
TESTOST SERPL-MCNC: 921 NG/DL (ref 95–948)
WBC # BLD AUTO: 6.81 THOUSAND/UL (ref 4.31–10.16)

## 2020-10-27 PROCEDURE — 84403 ASSAY OF TOTAL TESTOSTERONE: CPT

## 2020-10-27 PROCEDURE — 85027 COMPLETE CBC AUTOMATED: CPT

## 2020-10-27 PROCEDURE — 36415 COLL VENOUS BLD VENIPUNCTURE: CPT

## 2020-11-23 ENCOUNTER — TRANSCRIBE ORDERS (OUTPATIENT)
Dept: LAB | Facility: CLINIC | Age: 65
End: 2020-11-23

## 2020-11-23 ENCOUNTER — LAB (OUTPATIENT)
Dept: LAB | Facility: CLINIC | Age: 65
End: 2020-11-23
Payer: MEDICARE

## 2020-11-23 DIAGNOSIS — N39.0 URINARY TRACT INFECTION WITHOUT HEMATURIA, SITE UNSPECIFIED: ICD-10-CM

## 2020-11-23 DIAGNOSIS — Z00.00 ROUTINE GENERAL MEDICAL EXAMINATION AT A HEALTH CARE FACILITY: Primary | ICD-10-CM

## 2020-11-23 DIAGNOSIS — Z00.00 ROUTINE GENERAL MEDICAL EXAMINATION AT A HEALTH CARE FACILITY: ICD-10-CM

## 2020-11-23 LAB
ALBUMIN SERPL BCP-MCNC: 4.1 G/DL (ref 3.5–5)
ALP SERPL-CCNC: 38 U/L (ref 46–116)
ALT SERPL W P-5'-P-CCNC: 8 U/L (ref 12–78)
ANION GAP SERPL CALCULATED.3IONS-SCNC: 2 MMOL/L (ref 4–13)
AST SERPL W P-5'-P-CCNC: 12 U/L (ref 5–45)
BACTERIA UR QL AUTO: ABNORMAL /HPF
BASOPHILS # BLD AUTO: 0.06 THOUSANDS/ΜL (ref 0–0.1)
BASOPHILS NFR BLD AUTO: 1 % (ref 0–1)
BILIRUB SERPL-MCNC: 0.67 MG/DL (ref 0.2–1)
BILIRUB UR QL STRIP: ABNORMAL
BUN SERPL-MCNC: 15 MG/DL (ref 5–25)
CALCIUM SERPL-MCNC: 8.9 MG/DL (ref 8.3–10.1)
CHLORIDE SERPL-SCNC: 107 MMOL/L (ref 100–108)
CHOLEST SERPL-MCNC: 136 MG/DL (ref 50–200)
CLARITY UR: CLEAR
CO2 SERPL-SCNC: 30 MMOL/L (ref 21–32)
COLOR UR: ABNORMAL
CREAT SERPL-MCNC: 0.82 MG/DL (ref 0.6–1.3)
EOSINOPHIL # BLD AUTO: 0.2 THOUSAND/ΜL (ref 0–0.61)
EOSINOPHIL NFR BLD AUTO: 3 % (ref 0–6)
ERYTHROCYTE [DISTWIDTH] IN BLOOD BY AUTOMATED COUNT: 14 % (ref 11.6–15.1)
GFR SERPL CREATININE-BSD FRML MDRD: 92 ML/MIN/1.73SQ M
GLUCOSE P FAST SERPL-MCNC: 92 MG/DL (ref 65–99)
GLUCOSE UR STRIP-MCNC: NEGATIVE MG/DL
HCT VFR BLD AUTO: 40.1 % (ref 36.5–49.3)
HDLC SERPL-MCNC: 60 MG/DL
HGB BLD-MCNC: 12.9 G/DL (ref 12–17)
HGB UR QL STRIP.AUTO: NEGATIVE
HYALINE CASTS #/AREA URNS LPF: ABNORMAL /LPF
IMM GRANULOCYTES # BLD AUTO: 0.02 THOUSAND/UL (ref 0–0.2)
IMM GRANULOCYTES NFR BLD AUTO: 0 % (ref 0–2)
KETONES UR STRIP-MCNC: ABNORMAL MG/DL
LDLC SERPL CALC-MCNC: 69 MG/DL (ref 0–100)
LEUKOCYTE ESTERASE UR QL STRIP: NEGATIVE
LYMPHOCYTES # BLD AUTO: 1.12 THOUSANDS/ΜL (ref 0.6–4.47)
LYMPHOCYTES NFR BLD AUTO: 18 % (ref 14–44)
MCH RBC QN AUTO: 29.5 PG (ref 26.8–34.3)
MCHC RBC AUTO-ENTMCNC: 32.2 G/DL (ref 31.4–37.4)
MCV RBC AUTO: 92 FL (ref 82–98)
MONOCYTES # BLD AUTO: 0.73 THOUSAND/ΜL (ref 0.17–1.22)
MONOCYTES NFR BLD AUTO: 12 % (ref 4–12)
NEUTROPHILS # BLD AUTO: 4.01 THOUSANDS/ΜL (ref 1.85–7.62)
NEUTS SEG NFR BLD AUTO: 66 % (ref 43–75)
NITRITE UR QL STRIP: NEGATIVE
NON-SQ EPI CELLS URNS QL MICRO: ABNORMAL /HPF
NONHDLC SERPL-MCNC: 76 MG/DL
NRBC BLD AUTO-RTO: 0 /100 WBCS
PH UR STRIP.AUTO: 6 [PH]
PLATELET # BLD AUTO: 145 THOUSANDS/UL (ref 149–390)
PMV BLD AUTO: 11.1 FL (ref 8.9–12.7)
POTASSIUM SERPL-SCNC: 4.2 MMOL/L (ref 3.5–5.3)
PROT SERPL-MCNC: 7.2 G/DL (ref 6.4–8.2)
PROT UR STRIP-MCNC: ABNORMAL MG/DL
RBC # BLD AUTO: 4.37 MILLION/UL (ref 3.88–5.62)
RBC #/AREA URNS AUTO: ABNORMAL /HPF
SODIUM SERPL-SCNC: 139 MMOL/L (ref 136–145)
SP GR UR STRIP.AUTO: 1.03 (ref 1–1.03)
T4 FREE SERPL-MCNC: 0.78 NG/DL (ref 0.76–1.46)
TRIGL SERPL-MCNC: 34 MG/DL
TSH SERPL DL<=0.05 MIU/L-ACNC: 3.18 UIU/ML (ref 0.36–3.74)
UROBILINOGEN UR QL STRIP.AUTO: 1 E.U./DL
VIT B12 SERPL-MCNC: 252 PG/ML (ref 100–900)
WBC # BLD AUTO: 6.14 THOUSAND/UL (ref 4.31–10.16)
WBC #/AREA URNS AUTO: ABNORMAL /HPF

## 2020-11-23 PROCEDURE — 84439 ASSAY OF FREE THYROXINE: CPT

## 2020-11-23 PROCEDURE — 81001 URINALYSIS AUTO W/SCOPE: CPT

## 2020-11-23 PROCEDURE — 80053 COMPREHEN METABOLIC PANEL: CPT

## 2020-11-23 PROCEDURE — 36415 COLL VENOUS BLD VENIPUNCTURE: CPT

## 2020-11-23 PROCEDURE — 84443 ASSAY THYROID STIM HORMONE: CPT

## 2020-11-23 PROCEDURE — 82607 VITAMIN B-12: CPT

## 2020-11-23 PROCEDURE — 85025 COMPLETE CBC W/AUTO DIFF WBC: CPT

## 2020-11-23 PROCEDURE — 80061 LIPID PANEL: CPT

## 2020-11-27 DIAGNOSIS — G20 PARKINSON'S DISEASE (HCC): ICD-10-CM

## 2021-01-05 ENCOUNTER — IMMUNIZATIONS (OUTPATIENT)
Dept: FAMILY MEDICINE CLINIC | Facility: HOSPITAL | Age: 66
End: 2021-01-05

## 2021-01-05 DIAGNOSIS — Z23 ENCOUNTER FOR IMMUNIZATION: ICD-10-CM

## 2021-01-05 PROCEDURE — 0011A SARS-COV-2 / COVID-19 MRNA VACCINE (MODERNA) 100 MCG: CPT

## 2021-01-05 PROCEDURE — 91301 SARS-COV-2 / COVID-19 MRNA VACCINE (MODERNA) 100 MCG: CPT

## 2021-01-19 ENCOUNTER — TRANSCRIBE ORDERS (OUTPATIENT)
Dept: LAB | Facility: CLINIC | Age: 66
End: 2021-01-19

## 2021-01-19 ENCOUNTER — LAB (OUTPATIENT)
Dept: LAB | Facility: CLINIC | Age: 66
End: 2021-01-19
Payer: MEDICARE

## 2021-01-19 DIAGNOSIS — L40.9 PSORIASIS: ICD-10-CM

## 2021-01-19 DIAGNOSIS — E79.0 URICACIDEMIA: ICD-10-CM

## 2021-01-19 DIAGNOSIS — E78.5 HYPERLIPIDEMIA, UNSPECIFIED HYPERLIPIDEMIA TYPE: ICD-10-CM

## 2021-01-19 DIAGNOSIS — E79.8: ICD-10-CM

## 2021-01-19 DIAGNOSIS — E55.9 VITAMIN D DEFICIENCY, UNSPECIFIED: ICD-10-CM

## 2021-01-19 DIAGNOSIS — E78.5 HYPERLIPIDEMIA, UNSPECIFIED HYPERLIPIDEMIA TYPE: Primary | ICD-10-CM

## 2021-01-19 LAB
25(OH)D3 SERPL-MCNC: 27.1 NG/ML (ref 30–100)
ALBUMIN SERPL BCP-MCNC: 4.5 G/DL (ref 3.5–5)
ALP SERPL-CCNC: 45 U/L (ref 46–116)
ALT SERPL W P-5'-P-CCNC: 13 U/L (ref 12–78)
ANION GAP SERPL CALCULATED.3IONS-SCNC: 5 MMOL/L (ref 4–13)
AST SERPL W P-5'-P-CCNC: 12 U/L (ref 5–45)
BASOPHILS # BLD AUTO: 0.06 THOUSANDS/ΜL (ref 0–0.1)
BASOPHILS NFR BLD AUTO: 1 % (ref 0–1)
BILIRUB SERPL-MCNC: 0.46 MG/DL (ref 0.2–1)
BUN SERPL-MCNC: 22 MG/DL (ref 5–25)
CALCIUM SERPL-MCNC: 9.7 MG/DL (ref 8.3–10.1)
CHLORIDE SERPL-SCNC: 107 MMOL/L (ref 100–108)
CO2 SERPL-SCNC: 25 MMOL/L (ref 21–32)
CREAT SERPL-MCNC: 0.83 MG/DL (ref 0.6–1.3)
EOSINOPHIL # BLD AUTO: 0.17 THOUSAND/ΜL (ref 0–0.61)
EOSINOPHIL NFR BLD AUTO: 2 % (ref 0–6)
ERYTHROCYTE [DISTWIDTH] IN BLOOD BY AUTOMATED COUNT: 13.1 % (ref 11.6–15.1)
ERYTHROCYTE [SEDIMENTATION RATE] IN BLOOD: 14 MM/HOUR (ref 0–19)
GFR SERPL CREATININE-BSD FRML MDRD: 91 ML/MIN/1.73SQ M
GLUCOSE SERPL-MCNC: 105 MG/DL (ref 65–140)
HCT VFR BLD AUTO: 43 % (ref 36.5–49.3)
HGB BLD-MCNC: 13.7 G/DL (ref 12–17)
IMM GRANULOCYTES # BLD AUTO: 0.02 THOUSAND/UL (ref 0–0.2)
IMM GRANULOCYTES NFR BLD AUTO: 0 % (ref 0–2)
LYMPHOCYTES # BLD AUTO: 1.26 THOUSANDS/ΜL (ref 0.6–4.47)
LYMPHOCYTES NFR BLD AUTO: 18 % (ref 14–44)
MCH RBC QN AUTO: 29.5 PG (ref 26.8–34.3)
MCHC RBC AUTO-ENTMCNC: 31.9 G/DL (ref 31.4–37.4)
MCV RBC AUTO: 93 FL (ref 82–98)
MONOCYTES # BLD AUTO: 0.6 THOUSAND/ΜL (ref 0.17–1.22)
MONOCYTES NFR BLD AUTO: 8 % (ref 4–12)
NEUTROPHILS # BLD AUTO: 5.04 THOUSANDS/ΜL (ref 1.85–7.62)
NEUTS SEG NFR BLD AUTO: 71 % (ref 43–75)
NRBC BLD AUTO-RTO: 0 /100 WBCS
PLATELET # BLD AUTO: 166 THOUSANDS/UL (ref 149–390)
PMV BLD AUTO: 10.9 FL (ref 8.9–12.7)
POTASSIUM SERPL-SCNC: 4.4 MMOL/L (ref 3.5–5.3)
PROT SERPL-MCNC: 8.2 G/DL (ref 6.4–8.2)
RBC # BLD AUTO: 4.65 MILLION/UL (ref 3.88–5.62)
SODIUM SERPL-SCNC: 137 MMOL/L (ref 136–145)
URATE SERPL-MCNC: 4.7 MG/DL (ref 4.2–8)
WBC # BLD AUTO: 7.15 THOUSAND/UL (ref 4.31–10.16)

## 2021-01-19 PROCEDURE — 82306 VITAMIN D 25 HYDROXY: CPT

## 2021-01-19 PROCEDURE — 85025 COMPLETE CBC W/AUTO DIFF WBC: CPT

## 2021-01-19 PROCEDURE — 84550 ASSAY OF BLOOD/URIC ACID: CPT

## 2021-01-19 PROCEDURE — 85652 RBC SED RATE AUTOMATED: CPT

## 2021-01-19 PROCEDURE — 36415 COLL VENOUS BLD VENIPUNCTURE: CPT

## 2021-01-19 PROCEDURE — 80053 COMPREHEN METABOLIC PANEL: CPT

## 2021-02-03 ENCOUNTER — IMMUNIZATIONS (OUTPATIENT)
Dept: FAMILY MEDICINE CLINIC | Facility: HOSPITAL | Age: 66
End: 2021-02-03

## 2021-02-03 DIAGNOSIS — Z23 ENCOUNTER FOR IMMUNIZATION: Primary | ICD-10-CM

## 2021-02-03 PROCEDURE — 91301 SARS-COV-2 / COVID-19 MRNA VACCINE (MODERNA) 100 MCG: CPT

## 2021-02-03 PROCEDURE — 0012A SARS-COV-2 / COVID-19 MRNA VACCINE (MODERNA) 100 MCG: CPT

## 2021-02-22 ENCOUNTER — APPOINTMENT (OUTPATIENT)
Dept: LAB | Facility: CLINIC | Age: 66
End: 2021-02-22
Payer: MEDICARE

## 2021-02-22 ENCOUNTER — OFFICE VISIT (OUTPATIENT)
Dept: NEUROLOGY | Facility: CLINIC | Age: 66
End: 2021-02-22
Payer: MEDICARE

## 2021-02-22 VITALS
BODY MASS INDEX: 28.15 KG/M2 | DIASTOLIC BLOOD PRESSURE: 73 MMHG | SYSTOLIC BLOOD PRESSURE: 134 MMHG | WEIGHT: 201.1 LBS | HEART RATE: 59 BPM | HEIGHT: 71 IN

## 2021-02-22 DIAGNOSIS — G20 PARKINSON'S DISEASE (HCC): Primary | ICD-10-CM

## 2021-02-22 DIAGNOSIS — E29.1 HYPOGONADISM IN MALE: ICD-10-CM

## 2021-02-22 DIAGNOSIS — Z12.5 PROSTATE CANCER SCREENING: ICD-10-CM

## 2021-02-22 LAB
ERYTHROCYTE [DISTWIDTH] IN BLOOD BY AUTOMATED COUNT: 13.3 % (ref 11.6–15.1)
HCT VFR BLD AUTO: 42.4 % (ref 36.5–49.3)
HGB BLD-MCNC: 13.4 G/DL (ref 12–17)
MCH RBC QN AUTO: 29.5 PG (ref 26.8–34.3)
MCHC RBC AUTO-ENTMCNC: 31.6 G/DL (ref 31.4–37.4)
MCV RBC AUTO: 93 FL (ref 82–98)
PLATELET # BLD AUTO: 154 THOUSANDS/UL (ref 149–390)
PMV BLD AUTO: 10.5 FL (ref 8.9–12.7)
PSA SERPL-MCNC: 0.7 NG/ML (ref 0–4)
RBC # BLD AUTO: 4.54 MILLION/UL (ref 3.88–5.62)
TESTOST SERPL-MCNC: 743 NG/DL (ref 95–948)
WBC # BLD AUTO: 5.66 THOUSAND/UL (ref 4.31–10.16)

## 2021-02-22 PROCEDURE — G0103 PSA SCREENING: HCPCS

## 2021-02-22 PROCEDURE — 84403 ASSAY OF TOTAL TESTOSTERONE: CPT

## 2021-02-22 PROCEDURE — 36415 COLL VENOUS BLD VENIPUNCTURE: CPT

## 2021-02-22 PROCEDURE — 85027 COMPLETE CBC AUTOMATED: CPT

## 2021-02-22 PROCEDURE — 99214 OFFICE O/P EST MOD 30 MIN: CPT | Performed by: PSYCHIATRY & NEUROLOGY

## 2021-02-22 RX ORDER — CARBIDOPA AND LEVODOPA 50; 200 MG/1; MG/1
1 TABLET, EXTENDED RELEASE ORAL
Qty: 90 TABLET | Refills: 3 | Status: SHIPPED | OUTPATIENT
Start: 2021-02-22 | End: 2021-05-05 | Stop reason: SDUPTHER

## 2021-02-22 NOTE — PATIENT INSTRUCTIONS
[employer] be                                         Parkinson's Disease Resources     Helpful web sites   -  www Pocket  org   -  www parkinson  org (the [place])   -  www Dream[employer]ize ([address] [address] for beSUCCESS) - Order the "Every Victory Counts" ebonie under the "resources" tab  Or visit [facility] org/EV or call [phone]  [facility]  org/resources/parkinsons-exercise-essentials   - Contact the [employer] for an "Aware in care kit" @ 6613.731.4891 (this is a kit to take with you if you ever have to go to the hospital)   - www pmdalliance  org  -  parkinsons  [facility]/exercise_videos  html  - [facility] Exercise helpline: (541) 670-2510  - [employer]    - Check out "The [address]" for help paying for your medications: www tafcares  org

## 2021-02-22 NOTE — ASSESSMENT & PLAN NOTE
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

## 2021-02-22 NOTE — PROGRESS NOTES
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 levodopa-responsive parkinson's disease  His posture has gotten worse since he was last here with almost of Pisa syndrome appearance though there are multiple msk issues at play as well  We found good improvement from the increase in sinemet frequency to 4x daily  He has had some FoG overnight or if he is late with a sinemet dose  Will add on sinemet CR QHS  Discussed next step including Azilect  Also discussed Rytary  Follow up in 4 months with Dr Sara Escobedo PA-C         Diagnoses and all orders for this visit:    Parkinson's disease ([address] )  -     carbidopa-levodopa (SINEMET CR)  mg per tablet; Take 1 tablet by mouth daily at bedtime           Subjective:    HPI    I had the pleasure of seeing your patient, Lit Tripp in the Movement Disorders Clinic at the [facility]          Polo Henson is a 24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 who presents today in follow up for levodopa-responsive Parkinsons disease, symptom onset in 2017 (62yo) with gait dysfunction which consists of stooped posture, balance troubles and slowed walking speed  His course is complicated by wearing off       MRI brain, PTH and Jimenezs labs were sent given his relatively young age at onset  His MRI brain showed mild white matter disease  Ceruloplasmin was within normal limits  24 hour urine copper was elevated at 53 (upper limit of normal is 35)  Repeat 24 hour urine was within normal limits       Current medications and timing:  Sinemet 25/100 1 5 tab 4x dialy  @  6 11 4 9      Interval history:  Last time we increased the frequency of his sinemet dose  Found this very helpful  1 month ago started getting some freezing of gait with turns  Notices it over night when trying to get to the bathroom   Notices this at the end of his dose  Mostly it comes out if he is late with a dose  Family medicine and urgent care     Did big and loud this past spring     The following portions of the patient's history were reviewed and updated as appropriate: allergies, current medications, past family history, past medical history, past social history, past surgical history and problem list          Objective:  Blood pressure 134/73, pulse 59, height 5' 11" (1 803 m), weight 91 2 kg (201 lb 1 6 oz)  Physical Exam    Neurological Exam    Frontalis over activation     UPDRS motor:                              Time since last dose:  2     Speech  1     Facial Expression  2     Rigidity - Neck  1     Rigidity - Upper Extremity (Right)  2     Rigidity - Upper Extremity (Left)   1     Rigidity - Lower Extremity (Right)  2     Rigidity - Lower Extremity (Left)   2     Finger Taps (Right)   2     Finger Taps (Left)   2     Hand Movement (Right)  2     Hand Movement (Left)   1     Pronation/Supination (Right)  1     Pronation/Supination (Left)   1     Toe Tapping (Right) 1     Toe Tapping (Left) 1     Leg Agility (Right)  0     Leg Agility (Left)   0     Arising from Chair   1     Gait   1     Freezing of Gait 0     Postural Stability        Posture 3     Global spontaneity of movement 2     Postural Tremor (Right) 0     Postural Tremor (Left) 0     Kinetic Tremor (Right)  0     Kinetic Tremor (Left)  0     Rest tremor amplitude RUE 0     Rest tremor amplitude LUE 0     Rest tremor amplitude RLE 0     Reset tremor amplitude LLE 0     Lip/Jaw Tremor  0     Consistency of tremor 0     Motor Exam Total:          Dysmetria: none on FNF  Dyskinesia: none  Myoclonus: none     Stance: narrow-based and stable   Stride: normal speed, stride length, step height, walks with no assistive device   Arm swing: reduced bl with posturing on the right  Turn: stable, 4 steps       ROS:  Review of Systems   Constitutional: Negative    Negative for appetite change and fever    HENT: Negative  Negative for hearing loss, tinnitus, trouble swallowing and voice change  Eyes: Negative  Negative for photophobia and pain  Respiratory: Negative  Negative for shortness of breath  Cardiovascular: Negative  Negative for palpitations  Gastrointestinal: Negative  Negative for nausea and vomiting  Endocrine: Negative  Negative for cold intolerance  Genitourinary: Negative  Negative for dysuria, frequency and urgency  Musculoskeletal: Positive for gait problem  Negative for myalgias and neck pain  Fall- slipped on ice  Stiffness in knees and back  Some difficulty finding his footing  Unsteady   Skin: Negative  Negative for rash  Neurological: Positive for tremors  Negative for dizziness, seizures, syncope, facial asymmetry, speech difficulty, weakness, light-headedness, numbness and headaches  Hematological: Negative  Does not bruise/bleed easily  Psychiatric/Behavioral: Negative  Negative for confusion, hallucinations and sleep disturbance  The above ROS was reviewed and updated       Lonny Mitchell MD  Medical Director   [facility] [place] [address] [facility]

## 2021-04-01 ENCOUNTER — OFFICE VISIT (OUTPATIENT)
Dept: UROLOGY | Facility: CLINIC | Age: 66
End: 2021-04-01
Payer: MEDICARE

## 2021-04-01 VITALS
SYSTOLIC BLOOD PRESSURE: 130 MMHG | HEART RATE: 83 BPM | WEIGHT: 199 LBS | BODY MASS INDEX: 27.86 KG/M2 | DIASTOLIC BLOOD PRESSURE: 72 MMHG | HEIGHT: 71 IN

## 2021-04-01 DIAGNOSIS — N52.9 ERECTILE DYSFUNCTION, UNSPECIFIED ERECTILE DYSFUNCTION TYPE: Primary | ICD-10-CM

## 2021-04-01 DIAGNOSIS — E29.1 HYPOGONADISM IN MALE: ICD-10-CM

## 2021-04-01 DIAGNOSIS — Z12.5 SCREENING FOR PROSTATE CANCER: ICD-10-CM

## 2021-04-01 PROCEDURE — 99213 OFFICE O/P EST LOW 20 MIN: CPT | Performed by: PHYSICIAN ASSISTANT

## 2021-04-01 RX ORDER — VARDENAFIL HYDROCHLORIDE 20 MG/1
20 TABLET ORAL DAILY PRN
Qty: 6 TABLET | Refills: 1 | Status: SHIPPED | OUTPATIENT
Start: 2021-04-01 | End: 2021-11-30 | Stop reason: SDUPTHER

## 2021-04-01 RX ORDER — TESTOSTERONE CYPIONATE 200 MG/ML
INJECTION INTRAMUSCULAR
Qty: 10 ML | Refills: 3 | Status: SHIPPED | OUTPATIENT
Start: 2021-04-01 | End: 2022-06-02

## 2021-04-01 NOTE — PROGRESS NOTES
Assessment and Plan   1  Hypogonadism   - Testosterone level 2/22/21 was 743   - CBC unremarkable   - Repeat CBC, testosterone level in 6 months   - Continue testosterone therapy; will send over 3 month supply   - Patient will call with any questions   - All questions answered; patient understands and agrees with plan     2  Benign prostatic hyperplasia with lower urinary tract symptoms   - Exacerbated by history of Parkinson's   - Nocturia x 1; patient not bothered by this     3  Routine Prostate Cancer Screening   - PSA 0 7   - ANAYA today unremarkable   - Repeat PSA and ANAYA in 1 year     4  Erectile Dysfunction   - Managed on vardenafil 20 mg prn; refill sent to pharmacy       History of Present Illness   Adry Gregory is a 74 y o  male here for evaluation of hypogonadism, BPH with LUTS, routine prostate cancer screening, and erectile dysfunction  Patient was started on Depo-testosterone in August of last year and is having no problems or side effects at this time  Most recent testosterone level (2/22/21) was 743 and CBC was unremarkable  Patient also has a history of BPH; currently having no lower urinary tract symptoms that are bothersome to him  Patient would like to try a 3 month supply of the testosterone therapy due to cost and if too expensive, would like to talk about switching back to AVEED, which was previously at no cost to him  Review of Systems   Constitutional: Negative for activity change, appetite change, chills and fever  HENT: Negative for congestion and trouble swallowing  Respiratory: Negative for cough and shortness of breath  Cardiovascular: Negative for chest pain, palpitations and leg swelling  Gastrointestinal: Negative for abdominal pain, constipation, diarrhea, nausea and vomiting  Genitourinary: Negative for difficulty urinating, dysuria, flank pain, frequency, hematuria and urgency  Nocturia x 1   Musculoskeletal: Negative for back pain and gait problem     Skin: Negative for wound  Allergic/Immunologic: Negative for immunocompromised state  Neurological: Negative for dizziness and syncope  Hematological: Does not bruise/bleed easily  Psychiatric/Behavioral: Negative for confusion  All other systems reviewed and are negative  Vitals:    21 0905   BP: 130/72   Pulse: 83     Physical Exam   Constitutional:   General: He is not in acute distress  Appearance: Normal appearance  He is not ill-appearing  HENT:   Head: Normocephalic  Neck:   Musculoskeletal: Normal range of motion  Pulmonary:   Effort: Pulmonary effort is normal    Genitourinary:   Comments: Prostate smooth, non-tender, no nodules, approximately 30-35 g  Skin:   General: Skin is warm and dry  Neurological:   Mental Status: He is alert  Past Medical History:   Diagnosis Date    Arthritis     Hypertension     Parkinson's disease ([address] )     2 years ago    Psoriasis          Social History          Tobacco Use   Smoking Status Former Smoker    Quit date: 12    Years since quittin 2   Smokeless Tobacco Never Used           Family History   Problem Relation Age of Onset    Parkinsonism Mother     osnet in late 76s, lived into 80s       Prostate cancer Father     Psoriasis Father     Diabetes Maternal Grandmother     Stroke Paternal Grandmother     Diabetes Paternal Grandfather     Parkinsonism Cousin     1st cousin      The following portions of the patient's history were reviewed and updated as appropriate: allergies, current medications, past medical history, past social history, past surgical history and problem list    Results   Recent Results      ]         Lab Results   Component Value Date    PSA 0 7 2021    PSA 0 3 2020    PSA 0 3 2020    PSA 0 3 2019           Lab Results   Component Value Date    CALCIUM 9 7 2021    K 4 4 2021    CO2 25 2021     2021    BUN 22 2021    CREATININE 0 83 2021 Lab Results   Component Value Date    WBC 5 66 02/22/2021    HGB 13 4 02/22/2021    HCT 42 4 02/22/2021    MCV 93 02/22/2021     02/22/2021     Nona Dietrich PA-C

## 2021-04-01 NOTE — PROGRESS NOTES
4/1/2021      Chief Complaint   Patient presents with    Benign Prostatic Hypertrophy    Hypogonadism         Assessment and Plan  1  Hypogonadism  - Testosterone level 2/22/21 was 743  - CBC unremarkable  - Repeat CBC, testosterone level in 6 months  - Continue testosterone therapy; will send over 3 month supply  - Patient will call with any questions  - All questions answered; patient understands and agrees with plan    2  Benign prostatic hyperplasia with lower urinary tract symptoms  - Exacerbated by history of Parkinson's  - Nocturia x 1; patient not bothered by this    3  Routine Prostate Cancer Screening  - PSA 0 7  - ANAYA today unremarkable  - Repeat PSA and ANAYA in 1 year    4  Erectile Dysfunction  - Managed on vardenafil 20 mg prn; refill sent to pharmacy        History of Present Illness  Blossom Morel is a 74 y o  male here for evaluation of hypogonadism, BPH with LUTS, routine prostate cancer screening, and erectile dysfunction  Patient was started on Depo-testosterone in August of last year and is having no problems or side effects at this time  Most recent testosterone level (2/22/21) was 743 and CBC was unremarkable  Patient also has a history of BPH; currently having no lower urinary tract symptoms that are bothersome to him  Patient would like to try a 3 month supply of the testosterone therapy due to cost and if too expensive, would like to talk about switching back to AVEED, which was previously at no cost to him  Review of Systems   Constitutional: Negative for activity change, appetite change, chills and fever  HENT: Negative for congestion and trouble swallowing  Respiratory: Negative for cough and shortness of breath  Cardiovascular: Negative for chest pain, palpitations and leg swelling  Gastrointestinal: Negative for abdominal pain, constipation, diarrhea, nausea and vomiting     Genitourinary: Negative for difficulty urinating, dysuria, flank pain, frequency, hematuria and urgency  Nocturia x 1   Musculoskeletal: Negative for back pain and gait problem  Skin: Negative for wound  Allergic/Immunologic: Negative for immunocompromised state  Neurological: Negative for dizziness and syncope  Hematological: Does not bruise/bleed easily  Psychiatric/Behavioral: Negative for confusion  All other systems reviewed and are negative  Vitals  Vitals:    21 0905   BP: 130/72   BP Location: Left arm   Patient Position: Sitting   Cuff Size: Adult   Pulse: 83   Weight: 90 3 kg (199 lb)   Height: 5' 11" (1 803 m)       Physical Exam  Constitutional:       General: He is not in acute distress  Appearance: Normal appearance  He is not ill-appearing  HENT:      Head: Normocephalic  Neck:      Musculoskeletal: Normal range of motion  Pulmonary:      Effort: Pulmonary effort is normal    Genitourinary:     Comments: Prostate smooth, non-tender, no nodules, approximately 30-35 g  Skin:     General: Skin is warm and dry  Neurological:      Mental Status: He is alert             Past History  Past Medical History:   Diagnosis Date    Arthritis     Hypertension     Parkinson's disease ([facility] [address] )     2 years ago    Psoriasis      Social History     Socioeconomic History    Marital status: /Civil Union     Spouse name: None    Number of children: None    Years of education: None    Highest education level: None   Occupational History    None   Social Needs    Financial resource strain: None    Food insecurity     Worry: None     Inability: None    Transportation needs     Medical: None     Non-medical: None   Tobacco Use    Smoking status: Former Smoker     Quit date:      Years since quittin 2    Smokeless tobacco: Never Used   Substance and Sexual Activity    Alcohol use: No     Comment: quit     Drug use: No    Sexual activity: Not Currently   Lifestyle    Physical activity     Days per week: None     Minutes per session: None    Stress: None   Relationships    Social connections     Talks on phone: None     Gets together: None     Attends Baptism service: None     Active member of club or organization: None     Attends meetings of clubs or organizations: None     Relationship status: None    Intimate partner violence     Fear of current or ex partner: None     Emotionally abused: None     Physically abused: None     Forced sexual activity: None   Other Topics Concern    None   Social History Narrative    None     Social History     Tobacco Use   Smoking Status Former Smoker    Quit date:     Years since quittin 2   Smokeless Tobacco Never Used     Family History   Problem Relation Age of Onset    Parkinsonism Mother         osnet in late 76s, lived into 80s       Prostate cancer Father     Psoriasis Father     Diabetes Maternal Grandmother     Stroke Paternal Grandmother     Diabetes Paternal Grandfather     Parkinsonism Cousin         1st cousin        The following portions of the patient's history were reviewed and updated as appropriate: allergies, current medications, past medical history, past social history, past surgical history and problem list     Results  No results found for this or any previous visit (from the past 1 hour(s)) ]  Lab Results   Component Value Date    PSA 0 7 2021    PSA 0 3 2020    PSA 0 3 2020    PSA 0 3 2019     Lab Results   Component Value Date    CALCIUM 9 7 2021    K 4 4 2021    CO2 25 2021     2021    BUN 22 2021    CREATININE 0 83 2021     Lab Results   Component Value Date    WBC 5 66 2021    HGB 13 4 2021    HCT 42 4 2021    MCV 93 2021     2021       Swathi Carmichael PA-C

## 2021-05-04 DIAGNOSIS — G20 PARKINSON'S DISEASE (HCC): ICD-10-CM

## 2021-05-05 RX ORDER — CARBIDOPA AND LEVODOPA 50; 200 MG/1; MG/1
1 TABLET, EXTENDED RELEASE ORAL
Qty: 90 TABLET | Refills: 3 | Status: SHIPPED | OUTPATIENT
Start: 2021-05-05 | End: 2021-11-30 | Stop reason: SDUPTHER

## 2021-05-05 NOTE — TELEPHONE ENCOUNTER
Patient called in and states he also needs a refill of Sinemet CR  mg and would like this sent to the [facility] on [address] (states he uses different pharmacies based on which has the least expensive option)      Script entered, please review and sign, thanks!!
no

## 2021-05-14 ENCOUNTER — TELEPHONE (OUTPATIENT)
Dept: NEUROLOGY | Facility: CLINIC | Age: 66
End: 2021-05-14

## 2021-05-14 NOTE — TELEPHONE ENCOUNTER
Pt called requesting sinemet CR refill  Advised pt that script was sent to [facility] pharmacy on 5/5/21   Pt verbalized understanding

## 2021-05-25 ENCOUNTER — OFFICE VISIT (OUTPATIENT)
Dept: DENTISTRY | Facility: CLINIC | Age: 66
End: 2021-05-25

## 2021-05-25 VITALS — DIASTOLIC BLOOD PRESSURE: 72 MMHG | SYSTOLIC BLOOD PRESSURE: 111 MMHG | TEMPERATURE: 97.7 F | HEART RATE: 73 BPM

## 2021-05-25 DIAGNOSIS — K02.9 DENTAL CARIES: Primary | ICD-10-CM

## 2021-05-25 PROCEDURE — D2393 RESIN-BASED COMPOSITE - 3 SURFACES, POSTERIOR: HCPCS | Performed by: DENTIST

## 2021-05-25 NOTE — PROGRESS NOTES
Composite Filling    Shamar Loaiza presents for composite filling  PMH reviewed, no changes  Discussed with patient need for RCT if pulp exposure occurs or in future if pulp is inflamed  Pt understands and consents  Applied topical benzocaine, administered 1 carp 4% articaine 1:100k epi via local infiltration  #14-MOD:     Prepped tooth #14 with 245 carbide on high speed, removed existing amalgam and caries  Caries removed with round carbide on slow speed  Distal margin ~0 5 mm below gingival margin  Due to difficulty placing Tofflemire on distal margin, built up distal wall using [facility], margin appropriate  Placed tofflemire  Isolation with cotton rolls and dri-angles  Etch with 37% H2PO4, rinse, dry  Applied Vivapen Celestin with 20 second scrub once, gentle air dry and light cured for 10s  Restored with Tetric bulk christian shade A2 and light cured  Refined with finishing burs, polished with enhance point  Took tooth out of occlusion due to large restoration and high fracturing risk  Tooth has no existing contact  Informed pt of potential post-op sensitivities on tooth and gingiva  Pt understood  Informed pt that #14 will need crown to protect it from fractures  Pt understood  Recommended proceeding with crown after finishing other restorations  Pt understood  Pt claims going to another dentist to finish some restorative works since he needs to wait a long time between appointments in [facility]  Pt has #24 and 25 worked on by the other dentist      Tx sequence:   1  Finish restorations on lower anterior  2  Remove #5 crown, assess restorability and need for endo  3  #14 crown  Pt left ambulatory     Estrella Morales

## 2021-06-30 ENCOUNTER — OFFICE VISIT (OUTPATIENT)
Dept: NEUROLOGY | Facility: CLINIC | Age: 66
End: 2021-06-30
Payer: MEDICARE

## 2021-06-30 VITALS
BODY MASS INDEX: 27.58 KG/M2 | HEART RATE: 77 BPM | HEIGHT: 71 IN | SYSTOLIC BLOOD PRESSURE: 118 MMHG | DIASTOLIC BLOOD PRESSURE: 70 MMHG | WEIGHT: 197 LBS

## 2021-06-30 DIAGNOSIS — G20 PARKINSON'S DISEASE (HCC): Primary | ICD-10-CM

## 2021-06-30 PROCEDURE — 99214 OFFICE O/P EST MOD 30 MIN: CPT | Performed by: PHYSICIAN ASSISTANT

## 2021-06-30 NOTE — PATIENT INSTRUCTIONS
Patient with some slight progression since the last visit  He does notice that he is slower than he was in the past   He is still having wearing off between doses however he has had some benefit with the addition of the Sinemet CR before bed  Because of the wearing off we discussed making some changes to his current Sinemet dose  Will have him start by taking Sinemet  1 5 tabs at 6:00 a m , 10:00 a m , 2:00 p m , 6:00 p m  He will also continue to take his Sinemet CR before bed  If with this change he continues to have wearing off he will call the office and we will then consider starting a trial of Azilect or Comtan  He was encouraged to remain active  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

## 2021-06-30 NOTE — PROGRESS NOTES
Patient ID: Aniya Fermin is a 74 y o  male  Assessment/Plan:    Parkinson's disease [facility])  Patient with some slight progression since the last visit  He does notice that he is slower than he was in the past   He is still having wearing off between doses however he has had some benefit with the addition of the Sinemet CR before bed  Because of the wearing off we discussed making some changes to his current Sinemet dose  Will have him start by taking Sinemet  1 5 tabs at 6:00 a m , 10:00 a m , 2:00 p m , 6:00 p m  He will also continue to take his Sinemet CR before bed  If with this change he continues to have wearing off he will call the office and we will then consider starting a trial of Azilect or Comtan  He was encouraged to remain active  Posture remains an issue with right lean, it is likely that this is multifactorial related to back and knee issues as well  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  Subjective:    Discussed next step including Azilect  Also discussed Juan Albertotajuan alberto Carmichael  Aniya Fermin is a 68-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 in follow up for levodopa-responsive Parkinson's disease  To review, symptom onset in 2017 (62yo) with gait dysfunction which consists of stooped posture, balance troubles and slowed walking speed  His course is complicated by wearing off  MRI brain, PTH and Jimenez's labs were sent given his relatively young age at onset  His MRI brain showed mild white matter disease  Ceruloplasmin was within normal limits  24 hour urine copper was elevated at 53 (upper limit of normal is 35)  Repeat 24 hour urine was within normal limits  At his last visit his posture had worsened however he had some benefit with increased Sinemet frequency in the past   He was having some FoG at night and he was started in Sinemet CR       INTERVAL HISTORY:  He feels that he is overall doing well    He is sleeping better with the Sinemet CR and he is able to ambulate better in the middle of the night  He will feel the medication wear off after about 3-4 hours  He will have some freezing of gait when off along with some weakness and voice changes  Are of  He is able to perform all of his ADLs on his own  He is slow but able to do it all on his own  No issues with swallowing   He likes to go for walks   No falls   He may occasionally have a "halo" in the periphery of his vision  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  Current medications and timing:  Sinemet 25/100 1 5 tab 4x dialy  @  6 11 4 9    Sinemet CR 50/200mg 1tab qhs       I personally reviewed and updated the ROS  Objective:    Blood pressure 118/70, pulse 77, height 5' 11" (1 803 m), weight 89 4 kg (197 lb)  Physical Exam  Constitutional:       General: He is awake  Appearance: Normal appearance  HENT:      Right Ear: Hearing normal       Left Ear: Hearing normal    Eyes:      General: Lids are normal       Extraocular Movements: Extraocular movements intact  Pupils: Pupils are equal, round, and reactive to light  Pulmonary:      Effort: Pulmonary effort is normal    Neurological:      Mental Status: He is alert  Deep Tendon Reflexes: Strength normal    Psychiatric:         Speech: Speech normal          Neurological Exam  Mental Status  Awake and alert  Oriented to person, place and time  Speech is normal     Cranial Nerves  CN III, IV, VI: Extraocular movements intact bilaterally  Normal lids and orbits bilaterally  Pupils equal round and reactive to light bilaterally  CN V:  Right: Facial sensation is normal   Left: Facial sensation is normal on the left  CN VIII:  Right: Hearing is normal   Left: Hearing is normal   CN XII: Tongue midline without atrophy or fasciculations  Motor   Strength is 5/5 throughout all four extremities      Sensory  Light touch is normal in upper and lower extremities  Coordination  Right: Finger-to-nose normal   Left: Finger-to-nose normal     Gait  Right lean with decreased right arm swing, posturing of the right arm and hand when ambulating   UPDRS motor:     6/30/21                              Time since last dose:  2     Speech  1  1   Facial Expression  2  decreased blinking    Rigidity - Neck  1     Rigidity - Upper Extremity (Right)  2  2   Rigidity - Upper Extremity (Left)   1  2   Rigidity - Lower Extremity (Right)  2  1   Rigidity - Lower Extremity (Left)   2  1   Finger Taps (Right)   2  2   Finger Taps (Left)   2  3   Hand Movement (Right)  2  2   Hand Movement (Left)   1  2   Pronation/Supination (Right)  1  1   Pronation/Supination (Left)   1  1   Toe Tapping (Right) 1  1   Toe Tapping (Left) 1  0   Leg Agility (Right)  0  1   Leg Agility (Left)   0  0   Arising from Chair   1  2   Gait   1  2   Freezing of Gait 0  0   Postural Stability         Posture 3  3   Global spontaneity of movement 2  2   Postural Tremor (Right) 0  0   Postural Tremor (Left) 0  0   Kinetic Tremor (Right)  0  0   Kinetic Tremor (Left)  0  0   Rest tremor amplitude RUE 0  0   Rest tremor amplitude LUE 0  0   Rest tremor amplitude RLE 0  0   Reset tremor amplitude LLE 0  0   Lip/Jaw Tremor  0     Consistency of tremor 0  0   Motor Exam Total:            ROS:    Review of Systems   Constitutional: Negative  Negative for appetite change and fever  HENT: Negative  Negative for hearing loss, tinnitus, trouble swallowing and voice change  Eyes: Negative  Negative for photophobia and pain  Respiratory: Negative  Negative for shortness of breath  Cardiovascular: Negative  Negative for palpitations  Gastrointestinal: Negative  Negative for nausea and vomiting  Endocrine: Negative  Negative for cold intolerance  Genitourinary: Negative  Negative for dysuria, frequency and urgency  Musculoskeletal: Negative    Negative for myalgias and neck pain    Skin: Negative  Negative for rash  Neurological: Negative  Negative for dizziness, tremors, seizures, syncope, facial asymmetry, speech difficulty, weakness, light-headedness, numbness and headaches  Hematological: Negative  Does not bruise/bleed easily  Psychiatric/Behavioral: Negative  Negative for confusion, hallucinations and sleep disturbance

## 2021-06-30 NOTE — ASSESSMENT & PLAN NOTE
Patient with some slight progression since the last visit  He does notice that he is slower than he was in the past   He is still having wearing off between doses however he has had some benefit with the addition of the Sinemet CR before bed  Because of the wearing off we discussed making some changes to his current Sinemet dose  Will have him start by taking Sinemet  1 5 tabs at 6:00 a m , 10:00 a m , 2:00 p m , 6:00 p m  He will also continue to take his Sinemet CR before bed  If with this change he continues to have wearing off he will call the office and we will then consider starting a trial of Azilect or Comtan  He was encouraged to remain active  Posture remains an issue with right lean, it is likely that this is multifactorial related to back and knee issues as well  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

## 2021-09-02 ENCOUNTER — OFFICE VISIT (OUTPATIENT)
Dept: DENTISTRY | Facility: CLINIC | Age: 66
End: 2021-09-02

## 2021-09-02 ENCOUNTER — TELEPHONE (OUTPATIENT)
Dept: NEUROLOGY | Facility: CLINIC | Age: 66
End: 2021-09-02

## 2021-09-02 VITALS — TEMPERATURE: 97.5 F | SYSTOLIC BLOOD PRESSURE: 98 MMHG | DIASTOLIC BLOOD PRESSURE: 62 MMHG | HEART RATE: 76 BPM

## 2021-09-02 DIAGNOSIS — Z01.20 ENCOUNTER FOR DENTAL EXAMINATION: Primary | ICD-10-CM

## 2021-09-02 DIAGNOSIS — K02.9 CARIES: ICD-10-CM

## 2021-09-02 NOTE — PROGRESS NOTES
PPTC for restorative on #22,26  No CC at this visit  ASA 3 - significant hx for Parkinson's disease  Pain scale: 0/10    I/O exam with PA's taken of mandibular incisors:  #22,23: are non-restorable and severely broken down with gross caries  #24,25: large composite restorations  #26: large distal carious lesion approaching pulp    Recommended extracting #22,23,26 due to large carious lesions and difficulty in restorability, and adding on extracted teeth to existing RPD  Pt agreed to extract whichever teeth have poor prognosis and leaving #24,25 for now  Pt agreed to extract #22,23,26, wait 1 month for tissues to heal, then take mpressions to send the RPD to lab to add on missing teeth  Pt wants RPD back as soon as possible once we send it to the lab - will contact Tito to see turn around time to delivery      Dr Anjali Ramires    NV: extract #64,81,82  NNV: 1 month post-op for impressions to add teeth to mandibular RPD  NNNV: deliver RPD with mandibular incisors added on

## 2021-09-02 NOTE — TELEPHONE ENCOUNTER
That is fine  I sent a script for Azilect 1 mg, start with half a tab in the morning for the 1st 2 weeks then increase to 1 tab in the morning  Thanks!

## 2021-09-02 NOTE — TELEPHONE ENCOUNTER
Patient stated he'd like to start the azilect, he continues to have wearing off about 3 hours after he takes his sinemet dose  [facility] [address] on file

## 2021-09-14 ENCOUNTER — CLINICAL SUPPORT (OUTPATIENT)
Dept: DENTISTRY | Facility: CLINIC | Age: 66
End: 2021-09-14

## 2021-09-14 DIAGNOSIS — K03.6 ACCRETIONS ON TEETH: ICD-10-CM

## 2021-09-14 DIAGNOSIS — K03.6 DENTAL CALCULUS: ICD-10-CM

## 2021-09-14 DIAGNOSIS — Z01.20 ENCOUNTER FOR DENTAL EXAMINATION: Primary | ICD-10-CM

## 2021-09-14 PROCEDURE — D0120 PERIODIC ORAL EVALUATION - ESTABLISHED PATIENT: HCPCS | Performed by: DENTIST

## 2021-09-14 PROCEDURE — D1110 PROPHYLAXIS - ADULT: HCPCS

## 2021-09-14 NOTE — PROGRESS NOTES
Prophy    Dental procedures in this visit     - PROPHYLAXIS - ADULT (Completed)     Service provider: Padmini Marquez [facility], [address] [facility]     Billing provider: Chanda Smith DMD     - PERIODIC ORAL EVALUATION - ESTABLISHED PATIENT (Completed)     Service provider: Iveth Tong, [address] [facility]     Billing provider: Chanda Smith DMD       Method Used:  · Prophy Method Used: Hand Scaling  · Polished  · Flossed    Radiographs Taken:  · None    Intra/Extra Oral Cancer Screening:  · Shama/Torus    · Oral Hygiene:  · Poor    Plaque:  · Generalized  · Moderate  · Heavy    Calculus:  · Light    Bleeding:  · Bleeding on probing: No periodontal exam for this visit  · Generalized  · Light    Stain:  · None    Periodontal Charting:  · Spot probing  Tissue very bulbous  ·     Patient has Parkinson's and has trouble with flossing/ IP aids    Pt  Scheduled to return for extr  Of # [id number]   Teeth will be added to partial denture  We will wait on # 5  Decay under existing crown   Tooth may not be restorable    High caries rate   Suggested adjunctive Fl2   recommended Listerine with Fl2    BWX / PAX as needed next pro      EXAM:  Dr Susan Mccann     No orders of the defined types were placed in this encounter

## 2021-10-04 ENCOUNTER — APPOINTMENT (OUTPATIENT)
Dept: LAB | Facility: CLINIC | Age: 66
End: 2021-10-04
Payer: MEDICARE

## 2021-10-04 DIAGNOSIS — G20 PARKINSON'S DISEASE (HCC): ICD-10-CM

## 2021-10-04 DIAGNOSIS — E29.1 HYPOGONADISM IN MALE: ICD-10-CM

## 2021-10-04 LAB
BASOPHILS # BLD AUTO: 0.05 THOUSANDS/ΜL (ref 0–0.1)
BASOPHILS NFR BLD AUTO: 1 % (ref 0–1)
EOSINOPHIL # BLD AUTO: 0.17 THOUSAND/ΜL (ref 0–0.61)
EOSINOPHIL NFR BLD AUTO: 3 % (ref 0–6)
ERYTHROCYTE [DISTWIDTH] IN BLOOD BY AUTOMATED COUNT: 13.4 % (ref 11.6–15.1)
HCT VFR BLD AUTO: 41.1 % (ref 36.5–49.3)
HGB BLD-MCNC: 13.3 G/DL (ref 12–17)
IMM GRANULOCYTES # BLD AUTO: 0.01 THOUSAND/UL (ref 0–0.2)
IMM GRANULOCYTES NFR BLD AUTO: 0 % (ref 0–2)
LYMPHOCYTES # BLD AUTO: 1.31 THOUSANDS/ΜL (ref 0.6–4.47)
LYMPHOCYTES NFR BLD AUTO: 24 % (ref 14–44)
MCH RBC QN AUTO: 30.3 PG (ref 26.8–34.3)
MCHC RBC AUTO-ENTMCNC: 32.4 G/DL (ref 31.4–37.4)
MCV RBC AUTO: 94 FL (ref 82–98)
MONOCYTES # BLD AUTO: 0.63 THOUSAND/ΜL (ref 0.17–1.22)
MONOCYTES NFR BLD AUTO: 11 % (ref 4–12)
NEUTROPHILS # BLD AUTO: 3.37 THOUSANDS/ΜL (ref 1.85–7.62)
NEUTS SEG NFR BLD AUTO: 61 % (ref 43–75)
NRBC BLD AUTO-RTO: 0 /100 WBCS
PLATELET # BLD AUTO: 146 THOUSANDS/UL (ref 149–390)
PMV BLD AUTO: 10.5 FL (ref 8.9–12.7)
RBC # BLD AUTO: 4.39 MILLION/UL (ref 3.88–5.62)
TESTOST SERPL-MCNC: 1284 NG/DL (ref 95–948)
WBC # BLD AUTO: 5.54 THOUSAND/UL (ref 4.31–10.16)

## 2021-10-04 PROCEDURE — 36415 COLL VENOUS BLD VENIPUNCTURE: CPT

## 2021-10-04 PROCEDURE — 84403 ASSAY OF TOTAL TESTOSTERONE: CPT

## 2021-10-04 PROCEDURE — 85025 COMPLETE CBC W/AUTO DIFF WBC: CPT

## 2021-10-05 ENCOUNTER — OFFICE VISIT (OUTPATIENT)
Dept: UROLOGY | Facility: CLINIC | Age: 66
End: 2021-10-05
Payer: MEDICARE

## 2021-10-05 VITALS
BODY MASS INDEX: 29.68 KG/M2 | HEIGHT: 71 IN | SYSTOLIC BLOOD PRESSURE: 118 MMHG | DIASTOLIC BLOOD PRESSURE: 82 MMHG | WEIGHT: 212 LBS

## 2021-10-05 DIAGNOSIS — E29.1 MALE HYPOGONADISM: Primary | ICD-10-CM

## 2021-10-05 DIAGNOSIS — N40.0 BENIGN PROSTATIC HYPERPLASIA WITHOUT LOWER URINARY TRACT SYMPTOMS: ICD-10-CM

## 2021-10-05 PROCEDURE — 99213 OFFICE O/P EST LOW 20 MIN: CPT | Performed by: PHYSICIAN ASSISTANT

## 2021-10-05 RX ORDER — RASAGILINE 1 MG/1
TABLET ORAL
Qty: 90 TABLET | Refills: 3 | Status: SHIPPED | OUTPATIENT
Start: 2021-10-05 | End: 2021-11-18 | Stop reason: SDUPTHER

## 2021-10-28 ENCOUNTER — OFFICE VISIT (OUTPATIENT)
Dept: DENTISTRY | Facility: CLINIC | Age: 66
End: 2021-10-28

## 2021-10-28 VITALS — DIASTOLIC BLOOD PRESSURE: 83 MMHG | TEMPERATURE: 96.8 F | SYSTOLIC BLOOD PRESSURE: 126 MMHG

## 2021-10-28 DIAGNOSIS — K02.9 CARIES: Primary | ICD-10-CM

## 2021-10-28 PROCEDURE — D7140 EXTRACTION, ERUPTED TOOTH OR EXPOSED ROOT (ELEVATION AND/OR FORCEPS REMOVAL): HCPCS | Performed by: DENTIST

## 2021-11-18 DIAGNOSIS — G20 PARKINSON'S DISEASE (HCC): ICD-10-CM

## 2021-11-19 ENCOUNTER — TELEPHONE (OUTPATIENT)
Dept: NEUROLOGY | Facility: CLINIC | Age: 66
End: 2021-11-19

## 2021-11-19 RX ORDER — RASAGILINE 1 MG/1
1 TABLET ORAL DAILY
Qty: 90 TABLET | Refills: 3 | Status: SHIPPED | OUTPATIENT
Start: 2021-11-19 | End: 2021-11-30 | Stop reason: SDUPTHER

## 2021-11-30 ENCOUNTER — OFFICE VISIT (OUTPATIENT)
Dept: NEUROLOGY | Facility: CLINIC | Age: 66
End: 2021-11-30
Payer: MEDICARE

## 2021-11-30 VITALS
WEIGHT: 209 LBS | HEART RATE: 63 BPM | TEMPERATURE: 97.8 F | BODY MASS INDEX: 29.15 KG/M2 | SYSTOLIC BLOOD PRESSURE: 161 MMHG | DIASTOLIC BLOOD PRESSURE: 81 MMHG

## 2021-11-30 DIAGNOSIS — G20 PARKINSON'S DISEASE (HCC): Primary | ICD-10-CM

## 2021-11-30 DIAGNOSIS — N52.9 ERECTILE DYSFUNCTION, UNSPECIFIED ERECTILE DYSFUNCTION TYPE: ICD-10-CM

## 2021-11-30 PROCEDURE — 99214 OFFICE O/P EST MOD 30 MIN: CPT | Performed by: PHYSICIAN ASSISTANT

## 2021-11-30 RX ORDER — CARBIDOPA AND LEVODOPA 50; 200 MG/1; MG/1
1 TABLET, EXTENDED RELEASE ORAL
Qty: 90 TABLET | Refills: 3 | Status: SHIPPED | OUTPATIENT
Start: 2021-11-30 | End: 2022-06-07

## 2021-11-30 RX ORDER — RASAGILINE 1 MG/1
1 TABLET ORAL DAILY
Qty: 90 TABLET | Refills: 3 | Status: SHIPPED | OUTPATIENT
Start: 2021-11-30 | End: 2022-08-10 | Stop reason: SDUPTHER

## 2021-11-30 RX ORDER — VARDENAFIL HYDROCHLORIDE 20 MG/1
20 TABLET ORAL DAILY PRN
Qty: 3 TABLET | Refills: 2 | Status: SHIPPED | OUTPATIENT
Start: 2021-11-30

## 2021-11-30 RX ORDER — VARDENAFIL HYDROCHLORIDE 20 MG/1
20 TABLET ORAL DAILY PRN
Qty: 3 TABLET | Refills: 2 | Status: SHIPPED | OUTPATIENT
Start: 2021-11-30 | End: 2021-11-30 | Stop reason: SDUPTHER

## 2021-11-30 RX ORDER — ENTACAPONE 200 MG/1
200 TABLET ORAL 4 TIMES DAILY
Qty: 120 TABLET | Refills: 3 | Status: SHIPPED | OUTPATIENT
Start: 2021-11-30 | End: 2022-02-07 | Stop reason: SDUPTHER

## 2021-12-15 ENCOUNTER — OFFICE VISIT (OUTPATIENT)
Dept: DENTISTRY | Facility: CLINIC | Age: 66
End: 2021-12-15

## 2021-12-15 DIAGNOSIS — Z01.20 ENCOUNTER FOR DENTAL EXAMINATION: Primary | ICD-10-CM

## 2021-12-30 LAB — DENTAL LAB ACCEPTED: YES

## 2022-01-14 NOTE — TELEPHONE ENCOUNTER
Care Conference: 1/14/22    Family Attendance: Ania (mom) Celsa (sister) and Master ()    Imelda Liu  RN Care Coordinator   MICU/SICU  [phone]              Patient previously of the [place] office known to the practice for BPH and testosterone deficiency  Patient previously had one aveed injection 3/25/20  Patient has now switched to the [place] office  Last office visit 7/8/20 with [facility]  He was prescribed androgel at this time  Patient did not wish to have an $80 copay and plan was for repeat Aveed with Richardson HAJI  Called and spoke with patient at this time  Inquired if he was wanting to have aveed injection or testosterone cypionate, which is IM that can be done at home  Patient reports he does not wish to travel to the [facility] office  He is interested in the testosterone cypionate  He wants to ensure this will be less than $80  Advised we can provide prior auth and check for him  Reviewed we typically perform teaching session with a nurse for these injections and then patient performs them at home  Patient amenable to setting this up for the [place] office as he thinks his wife would also like to attend  Advised we will cancel androgel, order testosterone cypionate and then call once the prior auth has been completed  Patient thankful for the call back and will await to hear from us

## 2022-02-02 ENCOUNTER — OFFICE VISIT (OUTPATIENT)
Dept: DENTISTRY | Facility: CLINIC | Age: 67
End: 2022-02-02

## 2022-02-02 VITALS — TEMPERATURE: 96.8 F

## 2022-02-02 DIAGNOSIS — Z01.20 ENCOUNTER FOR DENTAL EXAMINATION: Primary | ICD-10-CM

## 2022-02-03 NOTE — PROGRESS NOTES
[facility] for originally planned delivery of updated mandibular RPD  CC: "My crown fell off a week ago, I brought it with me "    Pt presented to clinic with crown #27 in a medicine bottle  I/O exam shows slightly exposed metal post in #27 along with a deficient core  The tooth is still restorable, but will need additional core-buildup material and a refinement of the prep  Recommend re-making the crown to fit the new updated RPD  The RPD was tried in without the crown, and good fit/retention was noted  Recommend re-doing the crown prep, taking a final impression, and sending the impression to the lab along with an impression of the RPD so the lab can fabricate a surveyed crown to fit with the RPD  Pt agreed to tx plan  Cleaned up remaining cement from inside the crown of #27, and re-cemented the crown with Durelon cement temporarily until crown can be re-done  Dr Josh Wilson was consulted on the policy for re-making crowns if they were done here in clinic within a certain number of years  The crown on #27 was made in March 2018  According to Dr Josh Wilson, since the patient has been coming in periodically for exams and dental treatment, recommend we can re-do the crown at no charge as it's been less than 5 years since the crown was made       NV: core-buildup/crown prep #27  NNV: deliver new crown #27 along with RPD

## 2022-02-07 ENCOUNTER — OFFICE VISIT (OUTPATIENT)
Dept: NEUROLOGY | Facility: CLINIC | Age: 67
End: 2022-02-07
Payer: MEDICARE

## 2022-02-07 VITALS
BODY MASS INDEX: 30.32 KG/M2 | TEMPERATURE: 98.2 F | WEIGHT: 217.4 LBS | DIASTOLIC BLOOD PRESSURE: 66 MMHG | HEART RATE: 66 BPM | SYSTOLIC BLOOD PRESSURE: 120 MMHG

## 2022-02-07 DIAGNOSIS — G20 PARKINSON'S DISEASE (HCC): Primary | ICD-10-CM

## 2022-02-07 DIAGNOSIS — G47.52 REM BEHAVIORAL DISORDER: ICD-10-CM

## 2022-02-07 PROCEDURE — 99214 OFFICE O/P EST MOD 30 MIN: CPT | Performed by: PSYCHIATRY & NEUROLOGY

## 2022-02-07 RX ORDER — ENTACAPONE 200 MG/1
200 TABLET ORAL 4 TIMES DAILY
Qty: 120 TABLET | Refills: 8 | Status: SHIPPED | OUTPATIENT
Start: 2022-02-07 | End: 2022-02-22

## 2022-02-07 NOTE — PROGRESS NOTES
Review of Systems   Constitutional: Negative  Negative for appetite change and fever  HENT: Negative  Negative for hearing loss, tinnitus, trouble swallowing and voice change  Eyes: Negative  Negative for photophobia and pain  Respiratory: Negative  Negative for shortness of breath  Cardiovascular: Negative  Negative for palpitations  Gastrointestinal: Negative  Negative for nausea and vomiting  Endocrine: Negative  Negative for cold intolerance  Genitourinary: Negative  Negative for dysuria, frequency and urgency  Musculoskeletal: Positive for gait problem (ongoing)  Negative for myalgias and neck pain  Ongoing Balance Issues     Skin: Negative  Negative for rash  Allergic/Immunologic: Negative  Neurological: Positive for speech difficulty (at the end of the day     the voice goes)  Negative for dizziness, tremors, seizures, syncope, facial asymmetry, weakness, light-headedness, numbness and headaches  Hematological: Negative  Does not bruise/bleed easily  Psychiatric/Behavioral: Negative  Negative for confusion, hallucinations and sleep disturbance  All other systems reviewed and are negative

## 2022-02-07 NOTE — PROGRESS NOTES
Patient ID: Padmini Simon is a 74 y o  male  Assessment/Plan:    Parkinson's disease ([facility] )  Parkinsonian symptoms are fairly well controlled without wearing off as long as medications are taken on time  He has difficulty with gait which is also related to right knee arthritis  We will have him continue on Sinemet, Sinemet CR, entacapone, and rasagiline with no change  He is to call prior to follow-up should he develop any side effects such as lightheadedness or hallucinations  Time spent answering questions with regards to constipation and its relationship to Parkinson's disease  REM behavioral disorder  Occasional talking in sleep, but no longer experiencing many vivid dreams or acting out of dreams       Diagnoses and all orders for this visit:    Parkinson's disease ([facility] )  -     entacapone (Comtan) 200 mg tablet; Take 1 tablet (200 mg total) by mouth 4 (four) times a day Take ALONG with Sinemet    REM behavioral disorder           Subjective:    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 in follow up for levodopa-responsive Parkinson's disease  This is my first visit with the patient who was previously followed by Dr Cristal Garcia  Review of chart reveals, symptom onset in 2017 (62yo) with stooped posture, imbalance and slowed gait  His course is complicated by wearing off  MRI brain, PTH and Jimenez's labs were sent given his relatively young age at onset  His MRI brain showed mild white matter disease  Ceruloplasmin was within normal limits  24 hour urine copper was elevated at 53 (upper limit of normal is 35)  Repeat 24 hour urine was within normal limits       Current medications and timing:  Sinemet 25/100 1 5 tab 4x daily (3 5hours 6, 9:30, 1pm, 4:30)  Sinemet CR 50/200mg 1tab qhs   Azilect 1mg daily (helped wearing off)  Entacapone 200mg 4 x daily     Return of symptoms with slowness of movements present only in doses are taken later than normal   Otherwise there is no wearing off  He denies any tremor  Speech is soft  There is no drooling  No difficulty chewing and swallowing  Mild slowness  noted with daily routine acts  Gait is unchanged  He has occasional freezing typically occurring at the end of a dose or in the evening  He uses a walking stick  This helps keep him upright and with balance  No recent falls  Sleeps well for the most part  He can awaken twice nightly to use the bathroom but able to fall right back to sleep  There is no daytime sedation  There are no issues with urination  He increased fiber a few years ago for cardiac reasons  He is now taking more metamucil and Colace since his PD diagnosis  He does have current mild constipation at times  There are no experiences with lightheadedness on standing  Cognition is unchanged  Finances are managed by the patient  There is no difficulty with household tasks  He  drives with no issues  No hallucinations  Objective:    Blood pressure 120/66, pulse 66, temperature 98 2 °F (36 8 °C), weight 98 6 kg (217 lb 6 4 oz)  Physical Exam  Vitals reviewed  Eyes:      Extraocular Movements: Extraocular movements intact  Pupils: Pupils are equal, round, and reactive to light  Neurological:      Mental Status: He is alert  Deep Tendon Reflexes: Strength normal          Neurological Exam  Mental Status  Alert  Oriented to person, place, time and situation  Speech: hypophonia  Language is fluent with no aphasia  Attention and concentration are normal     Cranial Nerves  CN III, IV, VI: Extraocular movements intact bilaterally  Pupils equal round and reactive to light bilaterally  CN VIII: Hearing is normal   CN XI: Shoulder shrug strength is normal     Motor   Increased muscle tone  Strength is 5/5 throughout all four extremities  Sensory  Light touch is normal in upper and lower extremities       Coordination  Right: Finger-to-nose normal  Rapid alternating movement abnormality:  Left: Finger-to-nose normal  Rapid alternating movement abnormality:  See MDS UPDRS III    Mild to moderate bradykinesia on hand  more pronounced on the left  Mild bradykinesia on toe taps on the right  No action or rest tremor       Gait  Casual gait: Unable to rise from chair without using arms  Right knee bows in (related to arthritis)  Reduced stride  Karrie Nissen UPDRS motor:     2/7/22                              Time since last dose:     Speech  2   Facial Expression  1   Rigidity - Neck  2   Rigidity - Upper Extremity (Right)  2   Rigidity - Upper Extremity (Left)   2   Rigidity - Lower Extremity (Right)  2   Rigidity - Lower Extremity (Left)   2   Finger Taps (Right)   2   Finger Taps (Left)   2>   Hand Movement (Right)  1   Hand Movement (Left)   1   Pronation/Supination (Right)  2   Pronation/Supination (Left)   2>   Toe Tapping (Right) 1   Toe Tapping (Left) 0   Leg Agility (Right)  1   Leg Agility (Left)   0   Arising from Chair   2   Gait   2   Freezing of Gait 0   Postural Stability      Posture 3   Global spontaneity of movement 2   Postural Tremor (Right) 0   Postural Tremor (Left) 0   Kinetic Tremor (Right)  0   Kinetic Tremor (Left)  0   Rest tremor amplitude RUE 0   Rest tremor amplitude LUE 0   Rest tremor amplitude RLE 0   Reset tremor amplitude LLE 0   Lip/Jaw Tremor     Consistency of tremor 0   Motor Exam Total:            ROS:    Review of Systems    Constitutional: Negative  Negative for appetite change and fever  HENT: Negative  Negative for hearing loss, tinnitus, trouble swallowing and voice change  Eyes: Negative  Negative for photophobia and pain  Respiratory: Negative  Negative for shortness of breath  Cardiovascular: Negative  Negative for palpitations  Gastrointestinal: Negative  Negative for nausea and vomiting  Endocrine: Negative  Negative for cold intolerance  Genitourinary: Negative    Negative for dysuria, frequency and urgency  Musculoskeletal: Positive for gait problem (ongoing)  Negative for myalgias and neck pain  Ongoing Balance Issues     Skin: Negative  Negative for rash  Allergic/Immunologic: Negative  Neurological: Positive for speech difficulty (at the end of the day     the voice goes)  Negative for dizziness, tremors, seizures, syncope, facial asymmetry, weakness, light-headedness, numbness and headaches  Hematological: Negative  Does not bruise/bleed easily  Psychiatric/Behavioral: Negative  Negative for confusion, hallucinations and sleep disturbance  All other systems reviewed and are negative    Review of system documented by the MA, was personally reviewed

## 2022-02-07 NOTE — ASSESSMENT & PLAN NOTE
Parkinsonian symptoms are fairly well controlled without wearing off as long as medications are taken on time  He has difficulty with gait which is also related to right knee arthritis  We will have him continue on Sinemet, Sinemet CR, entacapone, and rasagiline with no change  He is to call prior to follow-up should he develop any side effects such as lightheadedness or hallucinations  Time spent answering questions with regards to constipation and its relationship to Parkinson's disease

## 2022-02-08 ENCOUNTER — OFFICE VISIT (OUTPATIENT)
Dept: DENTISTRY | Facility: CLINIC | Age: 67
End: 2022-02-08

## 2022-02-08 VITALS — HEART RATE: 66 BPM | DIASTOLIC BLOOD PRESSURE: 95 MMHG | SYSTOLIC BLOOD PRESSURE: 146 MMHG | TEMPERATURE: 97.8 F

## 2022-02-08 DIAGNOSIS — Z01.20 ENCOUNTER FOR DENTAL EXAMINATION: Primary | ICD-10-CM

## 2022-02-08 PROCEDURE — WIS2000 CROWN PREP: Performed by: DENTIST

## 2022-02-08 PROCEDURE — WIS2001 FINAL IMPRESSION - CROWN OR IMPLANT: Performed by: DENTIST

## 2022-02-08 NOTE — PROGRESS NOTES
PPTC for removal of crown on #27 and re-prepping tooth  No CC at this visit  Fabricated bite matrix with bluprint and triple tray  Applied topical benzocaine, administered 2 carps 4% articaine 1:100k epi via local infiltration  Sectioned PFM crown on #27 into M/D halves and removed crown  Tooth #27 re-prepped with reductions for PFM, had to add core-buildup material to add axial wall height and width  Made provisional crown with provisa and matrix, refined with dm on high speed  Confirmed provisional covers margins with no overhangs  Packed size 00 cord soaked in hemodent  Removed cord, air dry  Impression made with Delkit light and heavy body using triple tray  Impression removed after 5 min, confirmed preparation captured with no voids or distortions  Cemented provisional crown using TempBond  Removed excess cement, occlusion and contacts verified  Selected porcelain shade A2  Pt left satisfied and ambulatory  Delivered mandibular RPD today after temp crown was cemented on #27  Took a bite reg impression of where clasp and lingual plate engage #69 so lab can fabricate crown to fit RPD      NV: delivery of PFM crown #27

## 2022-02-16 ENCOUNTER — OFFICE VISIT (OUTPATIENT)
Dept: DENTISTRY | Facility: CLINIC | Age: 67
End: 2022-02-16

## 2022-02-16 VITALS — TEMPERATURE: 97.5 F | DIASTOLIC BLOOD PRESSURE: 73 MMHG | HEART RATE: 77 BPM | SYSTOLIC BLOOD PRESSURE: 113 MMHG

## 2022-02-16 DIAGNOSIS — Z01.20 ENCOUNTER FOR DENTAL EXAMINATION: Primary | ICD-10-CM

## 2022-02-16 PROCEDURE — WIS2001 FINAL IMPRESSION - CROWN OR IMPLANT: Performed by: DENTIST

## 2022-02-16 NOTE — PROGRESS NOTES
[facility] #27 crown prep  Lab called and would like a lower arch PVS impression including the crown prep on #27, and requested sending the pt's current lower RPD for fabrication of a new PFM crown  Removed temporary, cleaned excess cement  Used light/heavy body to take mandibular impression  Re-cemented temp crown back on with TempBond on #27 - removed occlusion on tooth and POI given      NV: #27 crown and RPD delivery

## 2022-02-22 DIAGNOSIS — G20 PARKINSON'S DISEASE (HCC): ICD-10-CM

## 2022-02-22 RX ORDER — ENTACAPONE 200 MG/1
200 TABLET ORAL 4 TIMES DAILY
Qty: 120 TABLET | Refills: 1 | Status: SHIPPED | OUTPATIENT
Start: 2022-02-22 | End: 2022-03-11

## 2022-03-10 DIAGNOSIS — G20 PARKINSON'S DISEASE (HCC): ICD-10-CM

## 2022-03-11 RX ORDER — ENTACAPONE 200 MG/1
200 TABLET ORAL 4 TIMES DAILY
Qty: 360 TABLET | Refills: 1 | Status: SHIPPED | OUTPATIENT
Start: 2022-03-11 | End: 2022-08-10 | Stop reason: SDUPTHER

## 2022-03-18 ENCOUNTER — OFFICE VISIT (OUTPATIENT)
Dept: DENTISTRY | Facility: CLINIC | Age: 67
End: 2022-03-18

## 2022-03-18 VITALS — SYSTOLIC BLOOD PRESSURE: 108 MMHG | DIASTOLIC BLOOD PRESSURE: 71 MMHG | TEMPERATURE: 96.6 F | HEART RATE: 80 BPM

## 2022-03-18 DIAGNOSIS — Z01.20 ENCOUNTER FOR DENTAL EXAMINATION: Primary | ICD-10-CM

## 2022-03-18 PROCEDURE — WIS5005 REMAKE: Performed by: DENTIST

## 2022-03-21 NOTE — PROGRESS NOTES
PPTC for crown delivery #27 and mandibular RPD delivery  Tried in PFM crown #27 with RPD, could not get the RPD to seat adequately around crown  Crown cemented with Provisa, removed excess cement from margins  Anterior/posterior rocking noted when putting pressure on mandibular denture teeth  Adjustments made to the crown and RPD, but adequate retention was not achieved  Upon making adjustments to the I-bar for the facial of #27, the I-bar broke from the RPD  Decided to place the RPD in the patients' mouth and capture a new bite-record  Mandibular pick-up impression with RPD captured and will be sent to lab for I-bar repair  Pt informed of situation and left in good spirits      NV: deliver mandibular RPD

## 2022-04-04 ENCOUNTER — APPOINTMENT (OUTPATIENT)
Dept: LAB | Facility: CLINIC | Age: 67
End: 2022-04-04
Payer: MEDICARE

## 2022-04-04 DIAGNOSIS — N40.0 BENIGN PROSTATIC HYPERPLASIA WITHOUT LOWER URINARY TRACT SYMPTOMS: ICD-10-CM

## 2022-04-04 DIAGNOSIS — E29.1 MALE HYPOGONADISM: ICD-10-CM

## 2022-04-04 LAB
ERYTHROCYTE [DISTWIDTH] IN BLOOD BY AUTOMATED COUNT: 13.2 % (ref 11.6–15.1)
HCT VFR BLD AUTO: 45.3 % (ref 36.5–49.3)
HGB BLD-MCNC: 14.5 G/DL (ref 12–17)
MCH RBC QN AUTO: 30.1 PG (ref 26.8–34.3)
MCHC RBC AUTO-ENTMCNC: 32 G/DL (ref 31.4–37.4)
MCV RBC AUTO: 94 FL (ref 82–98)
PLATELET # BLD AUTO: 162 THOUSANDS/UL (ref 149–390)
PMV BLD AUTO: 10.6 FL (ref 8.9–12.7)
PSA SERPL-MCNC: 0.5 NG/ML (ref 0–4)
RBC # BLD AUTO: 4.82 MILLION/UL (ref 3.88–5.62)
WBC # BLD AUTO: 4.68 THOUSAND/UL (ref 4.31–10.16)

## 2022-04-04 PROCEDURE — 84403 ASSAY OF TOTAL TESTOSTERONE: CPT

## 2022-04-04 PROCEDURE — 84153 ASSAY OF PSA TOTAL: CPT

## 2022-04-04 PROCEDURE — 36415 COLL VENOUS BLD VENIPUNCTURE: CPT

## 2022-04-04 PROCEDURE — 84402 ASSAY OF FREE TESTOSTERONE: CPT

## 2022-04-04 PROCEDURE — 85027 COMPLETE CBC AUTOMATED: CPT

## 2022-04-05 ENCOUNTER — OFFICE VISIT (OUTPATIENT)
Dept: UROLOGY | Facility: CLINIC | Age: 67
End: 2022-04-05
Payer: MEDICARE

## 2022-04-05 ENCOUNTER — TELEPHONE (OUTPATIENT)
Dept: OTHER | Facility: OTHER | Age: 67
End: 2022-04-05

## 2022-04-05 VITALS
BODY MASS INDEX: 32.89 KG/M2 | HEIGHT: 68 IN | HEART RATE: 78 BPM | SYSTOLIC BLOOD PRESSURE: 126 MMHG | WEIGHT: 217 LBS | DIASTOLIC BLOOD PRESSURE: 80 MMHG

## 2022-04-05 DIAGNOSIS — E29.1 MALE HYPOGONADISM: Primary | ICD-10-CM

## 2022-04-05 LAB
TESTOST FREE SERPL-MCNC: 4.4 PG/ML (ref 6.6–18.1)
TESTOST SERPL-MCNC: 181 NG/DL (ref 264–916)

## 2022-04-05 PROCEDURE — 99213 OFFICE O/P EST LOW 20 MIN: CPT | Performed by: PHYSICIAN ASSISTANT

## 2022-04-05 RX ORDER — RASAGILINE 1 MG/1
1 TABLET ORAL
COMMUNITY
Start: 2021-10-05 | End: 2022-08-10

## 2022-04-05 NOTE — PROGRESS NOTES
UROLOGY PROGRESS NOTE   Patient Identifiers: Lazarus Lamprey (MRN [id number])  Date of Service: 4/5/2022    Subjective: [place] 56-year-old man history of male hypogonadism and BPH  PSA 0 5  Current testosterone is pending  He has been on  Testosterone injections  Current testosterone levels are pending  His main complaint is his erectile dysfunction  The PDE5 have become ineffective  He has worsening Parkinson's  He may be interested in seeing Dr Nicole Sanchez to discuss penile prosthetic  Reason for visit:  Male hypogonadism and BPH follow-up      Objective:     VITALS:    There were no vitals filed for this visit          LABS:  Lab Results   Component Value Date    HGB 14 5 04/04/2022    HCT 45 3 04/04/2022    WBC 4 68 04/04/2022     04/04/2022   ]    Lab Results   Component Value Date    K 4 4 01/19/2021     01/19/2021    CO2 25 01/19/2021    BUN 22 01/19/2021    CREATININE 0 83 01/19/2021    CALCIUM 9 7 01/19/2021   ]        INPATIENT MEDS:    Current Outpatient Medications:     carbidopa-levodopa (SINEMET CR)  mg per tablet, Take 1 tablet by mouth daily at bedtime, Disp: 90 tablet, Rfl: 3    carbidopa-levodopa (SINEMET)  mg per tablet, TAKE 1 5 TABLETS BY MOUTH 4 (FOUR) TIMES A DAY, Disp: 540 tablet, Rfl: 3    cholecalciferol (VITAMIN D3) 1,000 units tablet, Take 2,000 Units by mouth daily, Disp: , Rfl:     docusate sodium (COLACE) 100 mg capsule, Take 100 mg by mouth daily , Disp: , Rfl:     entacapone (COMTAN) 200 mg tablet, TAKE 1 TABLET (200 MG TOTAL) BY MOUTH 4 (FOUR) TIMES A DAY TAKE ALONG WITH SINEMET, Disp: 360 tablet, Rfl: 1    Multiple Vitamins-Minerals (MULTIVITAMIN ADULT PO), Take 1 tablet by mouth daily, Disp: , Rfl:     psyllium (METAMUCIL) 58 6 % packet, Take 1 packet by mouth daily, Disp: , Rfl:     rasagiline (AZILECT) 1 MG, Take 1 tablet (1 mg total) by mouth daily, Disp: 90 tablet, Rfl: 3    Syringe/Needle, Disp, (SYRINGE 3CC/25GX1") 25G X 1" 3 ML MISC, by Does not apply route once a week, Disp: 10 each, Rfl: 3    testosterone cypionate (DEPO-TESTOSTERONE) 200 mg/mL SOLN, Inject 0 5mL IM once weekly  , Disp: 10 mL, Rfl: 3    vardenafil (LEVITRA) 20 MG tablet, Take 1 tablet (20 mg total) by mouth daily as needed for erectile dysfunction Take 1 tablet by mouth one (1) hour prior to intercourse  Limit to only 2 tablets per week , Disp: 3 tablet, Rfl: 2      Physical Exam:   There were no vitals taken for this visit  GEN: no acute distress    RESP: breathing comfortably with no accessory muscle use    ABD: soft, non-tender, non-distended   INCISION:    EXT: no significant peripheral edema   (Male): Penis circumcised, phallus normal, meatus patent  Testicles descended into scrotum bilaterally without masses nor tenderness  No inguinal hernias bilaterally  ANAYA: Prostate is not enlarged at 30 grams  The prostate is not boggy  The prostate is not tender  No nodules noted      RADIOLOGY:    none     Assessment:     1  BPH   2  Male hypogonadism   3   Erectile dysfunction     Plan:   - he is interested in seeing Dr Taylor Cuellar is to discuss a penile  - implant   -otherwise I will see him in 6 months with testosterone and CBC prior to visit  -  -

## 2022-04-18 ENCOUNTER — OFFICE VISIT (OUTPATIENT)
Dept: DENTISTRY | Facility: CLINIC | Age: 67
End: 2022-04-18

## 2022-04-18 VITALS — TEMPERATURE: 96.2 F | DIASTOLIC BLOOD PRESSURE: 70 MMHG | SYSTOLIC BLOOD PRESSURE: 109 MMHG | HEART RATE: 64 BPM

## 2022-04-18 DIAGNOSIS — K02.9 CARIES: Primary | ICD-10-CM

## 2022-04-18 PROCEDURE — D2391 RESIN-BASED COMPOSITE - 1 SURFACE, POSTERIOR: HCPCS | Performed by: DENTIST

## 2022-04-18 NOTE — PROGRESS NOTES
Composite Filling    Johan Anglin presents for composite filling #18-B  PMH reviewed, no changes  Clinically #18 has large buccal caries  Unclear how deep decay extends due to the presence of a crown on the tooth  Informed pt we can excavate the decay, but tooth may either need RCT or EXT in the future depending on symptoms and condition of tooth  Pt understood  Applied topical benzocaine, administered 1 carp 4% articaine 1:100k epi via infiltration    Prepped tooth #18-B with round carbide on high speed  Most caries removed with round carbide on slow speed  Informed pt the decay is extending very deep into the tooth and isolation is difficult  Recommend restoring tooth with Glass Ionomer for now, and pt should monitor symptoms going forward  Lower PVS impression taken for lab to adjust metal RPD      NV: mandibular RPD delivery

## 2022-05-17 ENCOUNTER — APPOINTMENT (OUTPATIENT)
Dept: LAB | Facility: CLINIC | Age: 67
End: 2022-05-17
Payer: MEDICARE

## 2022-05-17 ENCOUNTER — TRANSCRIBE ORDERS (OUTPATIENT)
Dept: LAB | Facility: CLINIC | Age: 67
End: 2022-05-17

## 2022-05-17 DIAGNOSIS — L40.9 PSORIASIS: ICD-10-CM

## 2022-05-17 DIAGNOSIS — E55.9 VITAMIN D DEFICIENCY: ICD-10-CM

## 2022-05-17 DIAGNOSIS — D64.9 ANEMIA, UNSPECIFIED TYPE: ICD-10-CM

## 2022-05-17 DIAGNOSIS — L40.9 PSORIASIS: Primary | ICD-10-CM

## 2022-05-17 LAB
25(OH)D3 SERPL-MCNC: 21.4 NG/ML (ref 30–100)
ALBUMIN SERPL BCP-MCNC: 3.8 G/DL (ref 3.5–5)
ALP SERPL-CCNC: 41 U/L (ref 46–116)
ALT SERPL W P-5'-P-CCNC: 14 U/L (ref 12–78)
ANION GAP SERPL CALCULATED.3IONS-SCNC: 3 MMOL/L (ref 4–13)
AST SERPL W P-5'-P-CCNC: 12 U/L (ref 5–45)
BASOPHILS # BLD AUTO: 0.03 THOUSANDS/ΜL (ref 0–0.1)
BASOPHILS NFR BLD AUTO: 1 % (ref 0–1)
BILIRUB SERPL-MCNC: 0.42 MG/DL (ref 0.2–1)
BUN SERPL-MCNC: 16 MG/DL (ref 5–25)
CALCIUM SERPL-MCNC: 9.3 MG/DL (ref 8.3–10.1)
CHLORIDE SERPL-SCNC: 103 MMOL/L (ref 100–108)
CO2 SERPL-SCNC: 30 MMOL/L (ref 21–32)
CREAT SERPL-MCNC: 0.98 MG/DL (ref 0.6–1.3)
EOSINOPHIL # BLD AUTO: 0.14 THOUSAND/ΜL (ref 0–0.61)
EOSINOPHIL NFR BLD AUTO: 3 % (ref 0–6)
ERYTHROCYTE [DISTWIDTH] IN BLOOD BY AUTOMATED COUNT: 12.3 % (ref 11.6–15.1)
ERYTHROCYTE [SEDIMENTATION RATE] IN BLOOD: 12 MM/HOUR (ref 0–19)
FERRITIN SERPL-MCNC: 90 NG/ML (ref 8–388)
FOLATE SERPL-MCNC: 9 NG/ML (ref 3.1–17.5)
GFR SERPL CREATININE-BSD FRML MDRD: 78 ML/MIN/1.73SQ M
GLUCOSE P FAST SERPL-MCNC: 109 MG/DL (ref 65–99)
HCT VFR BLD AUTO: 40.5 % (ref 36.5–49.3)
HGB BLD-MCNC: 13.2 G/DL (ref 12–17)
IMM GRANULOCYTES # BLD AUTO: 0.02 THOUSAND/UL (ref 0–0.2)
IMM GRANULOCYTES NFR BLD AUTO: 0 % (ref 0–2)
LYMPHOCYTES # BLD AUTO: 1.3 THOUSANDS/ΜL (ref 0.6–4.47)
LYMPHOCYTES NFR BLD AUTO: 28 % (ref 14–44)
MCH RBC QN AUTO: 29.6 PG (ref 26.8–34.3)
MCHC RBC AUTO-ENTMCNC: 32.6 G/DL (ref 31.4–37.4)
MCV RBC AUTO: 91 FL (ref 82–98)
MONOCYTES # BLD AUTO: 0.48 THOUSAND/ΜL (ref 0.17–1.22)
MONOCYTES NFR BLD AUTO: 10 % (ref 4–12)
NEUTROPHILS # BLD AUTO: 2.69 THOUSANDS/ΜL (ref 1.85–7.62)
NEUTS SEG NFR BLD AUTO: 58 % (ref 43–75)
NRBC BLD AUTO-RTO: 0 /100 WBCS
PLATELET # BLD AUTO: 160 THOUSANDS/UL (ref 149–390)
PMV BLD AUTO: 10.6 FL (ref 8.9–12.7)
POTASSIUM SERPL-SCNC: 4.4 MMOL/L (ref 3.5–5.3)
PROT SERPL-MCNC: 7.9 G/DL (ref 6.4–8.2)
RBC # BLD AUTO: 4.46 MILLION/UL (ref 3.88–5.62)
SODIUM SERPL-SCNC: 136 MMOL/L (ref 136–145)
VIT B12 SERPL-MCNC: 302 PG/ML (ref 100–900)
WBC # BLD AUTO: 4.66 THOUSAND/UL (ref 4.31–10.16)

## 2022-05-17 PROCEDURE — 36415 COLL VENOUS BLD VENIPUNCTURE: CPT

## 2022-05-17 PROCEDURE — 82607 VITAMIN B-12: CPT

## 2022-05-17 PROCEDURE — 82746 ASSAY OF FOLIC ACID SERUM: CPT

## 2022-05-17 PROCEDURE — 82306 VITAMIN D 25 HYDROXY: CPT

## 2022-05-17 PROCEDURE — 85025 COMPLETE CBC W/AUTO DIFF WBC: CPT

## 2022-05-17 PROCEDURE — 82728 ASSAY OF FERRITIN: CPT

## 2022-05-17 PROCEDURE — 85652 RBC SED RATE AUTOMATED: CPT

## 2022-05-17 PROCEDURE — 80053 COMPREHEN METABOLIC PANEL: CPT

## 2022-05-25 ENCOUNTER — OFFICE VISIT (OUTPATIENT)
Dept: DENTISTRY | Facility: CLINIC | Age: 67
End: 2022-05-25

## 2022-05-25 VITALS — TEMPERATURE: 98.4 F | SYSTOLIC BLOOD PRESSURE: 113 MMHG | HEART RATE: 68 BPM | DIASTOLIC BLOOD PRESSURE: 74 MMHG

## 2022-05-25 DIAGNOSIS — Z01.20 ENCOUNTER FOR DENTAL EXAMINATION: Primary | ICD-10-CM

## 2022-05-25 PROCEDURE — D0277 VERTICAL BITEWINGS - 7 TO 8 RADIOGRAPHIC IMAGES: HCPCS

## 2022-05-25 PROCEDURE — D0120 PERIODIC ORAL EVALUATION - ESTABLISHED PATIENT: HCPCS | Performed by: DENTIST

## 2022-05-25 PROCEDURE — D1110 PROPHYLAXIS - ADULT: HCPCS

## 2022-05-25 NOTE — PROGRESS NOTES
RECALL EXAM, ADULT KEI,   RONNIE     REVIEWED MED HX: meds, allergies, health changes reviewed in EPIC  CHIEF CONCERN:  none   PAIN SCALE:  0  ASA CLASS:  II   PLAQUE:  mild    CALCULUS: light calculus  BLEEDING:    moderate   STAIN :   none   ORAL HYGIENE:  fair    Cavitron, Hand scaled, polished and flossed  Patient has many areas of crown and bridge he tries to floss under bridge  Patient asked about his appt for lower part according to Dr Jae Mcburney notes next appt deliver lower part ( 3/22)  I asked the front office to see if its here and to schedule with Dr Debbi Flores Instruction:  recommended brushing 2 x daily for 2 minutes MIN, recommended flossing daily, reviewed dietary precautions  Visual and Tactile Intraoral/ Extraoral evaluation: Oral and Oropharyngeal cancer evaluation  No findings     Dr Sha Richardson  exam=   Reviewed with patient clinical and radiographic findings and patient verbalized understanding  All questions and concerns addressed       REFERRALS: no referrals needed    CARIES FINDINGS: no caries noted    Next Visit:  Deliver lower partial     Next Recall: 6 month recall     Last BWX: today VBW 5/25/2022  Last  FMX : 2/2/2018

## 2022-05-28 ENCOUNTER — TELEPHONE (OUTPATIENT)
Dept: OTHER | Facility: OTHER | Age: 67
End: 2022-05-28

## 2022-05-28 NOTE — TELEPHONE ENCOUNTER
Patient called to cancel his appointment for 5/31 at 11:30 with Dr Robles Ferraro  He will call back to reschedule

## 2022-06-02 DIAGNOSIS — E29.1 HYPOGONADISM IN MALE: ICD-10-CM

## 2022-06-02 RX ORDER — TESTOSTERONE CYPIONATE 200 MG/ML
INJECTION INTRAMUSCULAR
Qty: 10 ML | Refills: 4 | Status: SHIPPED | OUTPATIENT
Start: 2022-06-02

## 2022-06-02 RX ORDER — SYRINGE WITH NEEDLE, 1 ML 25GX5/8"
SYRINGE, EMPTY DISPOSABLE MISCELLANEOUS
Qty: 10 EACH | Refills: 3 | Status: SHIPPED | OUTPATIENT
Start: 2022-06-02

## 2022-06-06 DIAGNOSIS — G20 PARKINSON'S DISEASE (HCC): ICD-10-CM

## 2022-06-07 RX ORDER — CARBIDOPA AND LEVODOPA 50; 200 MG/1; MG/1
TABLET, EXTENDED RELEASE ORAL
Qty: 90 TABLET | Refills: 3 | Status: SHIPPED | OUTPATIENT
Start: 2022-06-07 | End: 2022-08-10 | Stop reason: SDUPTHER

## 2022-06-15 ENCOUNTER — OFFICE VISIT (OUTPATIENT)
Dept: DENTISTRY | Facility: CLINIC | Age: 67
End: 2022-06-15

## 2022-06-15 VITALS — DIASTOLIC BLOOD PRESSURE: 75 MMHG | SYSTOLIC BLOOD PRESSURE: 117 MMHG | HEART RATE: 79 BPM | TEMPERATURE: 99.1 F

## 2022-06-15 DIAGNOSIS — Z01.20 ENCOUNTER FOR DENTAL EXAMINATION: Primary | ICD-10-CM

## 2022-06-15 PROCEDURE — D5650 ADD TOOTH TO EXISTING PARTIAL DENTURE: HCPCS | Performed by: DENTIST

## 2022-06-15 NOTE — PROGRESS NOTES
[facility] for delivery of updated RPD  No CC at today's visit  Tried in mandibular RPD - had to made adjustments to the anterior denture teeth, the intaglio surfaces, and tightened the I-bar clasps to engage the teeth for more retention  Pt reported comfortable fit after adjustments, felt enough retention during speaking  Pt reports the RPD dislodges when biting  Informed pt we've adjusted all we can for today, recommend following up for adjustments as needed in the future  Recommend fabricating a new lower RPD in the future as it will fit much better than his current RPD   Pt's current RPD may not have the best fit as #22-26 were added to existing partial     NV: 6mrc

## 2022-08-10 ENCOUNTER — OFFICE VISIT (OUTPATIENT)
Dept: NEUROLOGY | Facility: CLINIC | Age: 67
End: 2022-08-10
Payer: MEDICARE

## 2022-08-10 VITALS
DIASTOLIC BLOOD PRESSURE: 78 MMHG | SYSTOLIC BLOOD PRESSURE: 118 MMHG | TEMPERATURE: 97.8 F | BODY MASS INDEX: 32.99 KG/M2 | WEIGHT: 217 LBS

## 2022-08-10 DIAGNOSIS — G20 PARKINSON'S DISEASE (HCC): Primary | ICD-10-CM

## 2022-08-10 PROCEDURE — 99214 OFFICE O/P EST MOD 30 MIN: CPT | Performed by: PHYSICIAN ASSISTANT

## 2022-08-10 RX ORDER — RASAGILINE 1 MG/1
1 TABLET ORAL DAILY
Qty: 90 TABLET | Refills: 3 | Status: SHIPPED | OUTPATIENT
Start: 2022-08-10 | End: 2022-10-26

## 2022-08-10 RX ORDER — CARBIDOPA AND LEVODOPA 50; 200 MG/1; MG/1
1 TABLET, EXTENDED RELEASE ORAL
Qty: 90 TABLET | Refills: 3 | Status: SHIPPED | OUTPATIENT
Start: 2022-08-10

## 2022-08-10 RX ORDER — ENTACAPONE 200 MG/1
200 TABLET ORAL 4 TIMES DAILY
Qty: 360 TABLET | Refills: 3 | Status: SHIPPED | OUTPATIENT
Start: 2022-08-10 | End: 2022-10-26

## 2022-08-10 NOTE — PATIENT INSTRUCTIONS
Patient with Parkinson's disease  He is overall doing well with a good on with the medications  He will notice some wearing off especially if he is late for a dose  He also been noticing some increased weakness if he is not well hydrated the day prior  Not orthostatic on exam today  He does have some left greater than right bradykinesia and rigidity on exam however he is currently 30 min late for his Sinemet dose  Overall he feels that he is still functioning well and will not make any changes to his medications  Could consider switching to Rytary in the future to see if wearing off becomes more of an issue  He continues to have some issues with imbalance, uses a cane at times  He is still driving however would like to get a handicap placard which I feel is reasonable at this time  Paperwork filled out  Referral made for PT and speech therapy  He does notice that he slows down and has more issus with soft voice especially later in the day

## 2022-08-10 NOTE — ASSESSMENT & PLAN NOTE
Patient with Parkinson's disease  He is overall doing well with a good on with the medications  He will notice some wearing off especially if he is late for a dose  He also been noticing some increased weakness if he is not well hydrated the day prior  Not orthostatic on exam today  Discussed the importance of staying hydrated  He will continue to monitor, no syncopal episodes or clear dizziness  He has some left greater than right bradykinesia and rigidity on exam however he is currently 30 min late for his Sinemet dose  Overall he feels that he is still functioning well and will not make any changes to his medications  Could consider switching to Rytary in the future to see if wearing off becomes more of an issue  He continues to have some issues with imbalance, uses a cane at times  He is still driving however would like to get a handicap placard which I feel is reasonable at this time  Paperwork filled out  Referral made for PT and speech therapy  He does notice that he slows down and has more issus with soft voice especially later in the day

## 2022-08-10 NOTE — PROGRESS NOTES
Patient ID: José Acuña is a 69 y o  male  Assessment/Plan:    Parkinson's disease [facility])  Patient with Parkinson's disease  He is overall doing well with a good on with the medications  He will notice some wearing off especially if he is late for a dose  He also been noticing some increased weakness if he is not well hydrated the day prior  Not orthostatic on exam today  Discussed the importance of staying hydrated  He will continue to monitor, no syncopal episodes or clear dizziness  He has some left greater than right bradykinesia and rigidity on exam however he is currently 30 min late for his Sinemet dose  Overall he feels that he is still functioning well and will not make any changes to his medications  Could consider switching to Rytary in the future to see if wearing off becomes more of an issue  He continues to have some issues with imbalance, uses a cane at times  He is still driving however would like to get a handicap placard which I feel is reasonable at this time  Paperwork filled out  Referral made for PT and speech therapy  He does notice that he slows down and has more issus with soft voice especially later in the day  Subjective:    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 in follow up for levodopa-responsive Parkinson's disease  This is my first visit with the patient who was previously followed by Dr Lukas Owusu  Review of chart reveals, symptom onset in 2017 (62yo) with stooped posture, imbalance and slowed gait  His course is complicated by wearing off  MRI brain, PTH and Jimenez's labs were sent given his relatively young age at onset  His MRI brain showed mild white matter disease  Ceruloplasmin was within normal limits  24 hour urine copper was elevated at 53 (upper limit of normal is 35)  Repeat 24 hour urine was within normal limits  At his last visit he was overall stable    No changes were made to his medications  INTERVAL HISTORY:  Since around Feb he has been having some episodes of generalized weakness that can come on, this has been happening more in the past month  He feels that this happens more when he has not drank enough water the day before  He does use a cane for balance especially at the end of the day   Voice can become hoarse at times especially at the end of the day   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   He feels that he can still keep up with work for the most part   He can perform all of his ADLs on his own, he is slower in general   He has a good on with the medication, will feel off time if he is late for a dose   No issues with swallowing   Sleeping well   No hallucinations, he may have had some delusions at times    Feels that his memory is still good     Current medications and timing:  Sinemet 25/100 1 5 tab 4x daily (3 5hours 6, 9:30, 1pm, 4:30)  Sinemet CR 50/200mg 1tab qhs   Azilect 1mg daily (helped wearing off)  Entacapone 200mg 4 x daily       I personally reviewed and updated the ROS  Objective:    Blood pressure 118/78, temperature 97 8 °F (36 6 °C), weight 98 4 kg (217 lb)  Physical Exam  Constitutional:       Appearance: Normal appearance  HENT:      Right Ear: Hearing normal       Left Ear: Hearing normal    Eyes:      Extraocular Movements: Extraocular movements intact  Pupils: Pupils are equal, round, and reactive to light  Pulmonary:      Effort: Pulmonary effort is normal    Neurological:      Mental Status: He is alert  Deep Tendon Reflexes: Strength normal          Neurological Exam  Mental Status  Alert  Oriented to person, place, time and situation  Speech: hypophonia  Language is fluent with no aphasia  Attention and concentration are normal     Cranial Nerves  CN III, IV, VI: Extraocular movements intact bilaterally   Pupils equal round and reactive to light bilaterally  CN V:  Right: Facial sensation is normal   Left: Facial sensation is normal on the left  CN VII:  Right: There is no facial weakness  Left: There is no facial weakness  CN VIII:  Right: Hearing is normal   Left: Hearing is normal   CN XI: Shoulder shrug strength is normal     Motor   Increased muscle tone  Strength is 5/5 throughout all four extremities  Sensory  Light touch is normal in upper and lower extremities  Coordination  Right: Finger-to-nose normal  Rapid alternating movement abnormality:Left: Finger-to-nose normal  Rapid alternating movement abnormality:  See MDS UPDRS III    No tremors noted     Gait  Casual gait: Unable to rise from chair without using arms  Right knee bows in (related to arthritis)  Reduced stride  Left tilt   UPDRS motor:     2/7/22 8/10/22                              Time since last dose:       Speech  2 2   Facial Expression  1 -   Rigidity - Neck  2 2   Rigidity - Upper Extremity (Right)  2 1   Rigidity - Upper Extremity (Left)   2 2   Rigidity - Lower Extremity (Right)  2 2   Rigidity - Lower Extremity (Left)   2 2   Finger Taps (Right)   2 1   Finger Taps (Left)   2> 2   Hand Movement (Right)  1 1   Hand Movement (Left)   1 2   Pronation/Supination (Right)  2 2   Pronation/Supination (Left)   2> 2-3   Toe Tapping (Right) 1 1   Toe Tapping (Left) 0 1   Leg Agility (Right)  1 2   Leg Agility (Left)   0 2   Arising from Chair   2 2   Gait   2 2   Freezing of Gait 0 0   Postural Stability        Posture 3 3   Global spontaneity of movement 2 2   Postural Tremor (Right) 0 0   Postural Tremor (Left) 0 0   Kinetic Tremor (Right)  0 0   Kinetic Tremor (Left)  0 0   Rest tremor amplitude RUE 0 0   Rest tremor amplitude LUE 0 0   Rest tremor amplitude RLE 0 0   Reset tremor amplitude LLE 0 0   Lip/Jaw Tremor       Consistency of tremor 0 0   Motor Exam Total:           ROS:    Review of Systems   Constitutional: Negative    Negative for appetite change and fever  HENT: Positive for voice change (A little bit of voice issue)  Negative for hearing loss, tinnitus and trouble swallowing  Eyes: Negative  Negative for photophobia and pain  Respiratory: Negative  Negative for shortness of breath  Cardiovascular: Negative  Negative for palpitations  Gastrointestinal: Negative  Negative for nausea and vomiting  Endocrine: Negative  Negative for cold intolerance  Genitourinary: Negative  Negative for dysuria, frequency and urgency  Musculoskeletal: Positive for gait problem (Ongoing)  Negative for myalgias and neck pain  Balance issue is ongoing with no change and no falls     Skin: Negative  Negative for rash  Allergic/Immunologic: Negative  Neurological: Positive for speech difficulty  Negative for dizziness, tremors, seizures, syncope, facial asymmetry, weakness (Needs to stay more hydrated, he has been having less hydration and when not hydrated he feels weak), light-headedness, numbness and headaches  Hematological: Negative  Does not bruise/bleed easily  Psychiatric/Behavioral: Negative  Negative for confusion, hallucinations and sleep disturbance  All other systems reviewed and are negative

## 2022-10-26 DIAGNOSIS — G20 PARKINSON'S DISEASE (HCC): ICD-10-CM

## 2022-10-26 RX ORDER — RASAGILINE 1 MG/1
TABLET ORAL
Qty: 90 TABLET | Refills: 3 | Status: SHIPPED | OUTPATIENT
Start: 2022-10-26

## 2022-10-26 RX ORDER — ENTACAPONE 200 MG/1
200 TABLET ORAL 4 TIMES DAILY
Qty: 120 TABLET | Refills: 8 | Status: SHIPPED | OUTPATIENT
Start: 2022-10-26

## 2022-11-23 ENCOUNTER — TRANSCRIBE ORDERS (OUTPATIENT)
Dept: LAB | Facility: CLINIC | Age: 67
End: 2022-11-23

## 2022-11-23 ENCOUNTER — APPOINTMENT (OUTPATIENT)
Dept: LAB | Facility: CLINIC | Age: 67
End: 2022-11-23

## 2022-11-23 DIAGNOSIS — Z13.29 SCREENING FOR THYROID DISORDER: ICD-10-CM

## 2022-11-23 DIAGNOSIS — I10 ESSENTIAL HYPERTENSION: Primary | ICD-10-CM

## 2022-11-23 DIAGNOSIS — E55.9 VITAMIN D DEFICIENCY, UNSPECIFIED: ICD-10-CM

## 2022-11-23 DIAGNOSIS — I10 ESSENTIAL HYPERTENSION: ICD-10-CM

## 2022-11-23 DIAGNOSIS — Z12.5 SPECIAL SCREENING FOR MALIGNANT NEOPLASM OF PROSTATE: ICD-10-CM

## 2022-11-23 LAB
25(OH)D3 SERPL-MCNC: 25.6 NG/ML (ref 30–100)
ALBUMIN SERPL BCP-MCNC: 3.6 G/DL (ref 3.5–5)
ALP SERPL-CCNC: 44 U/L (ref 46–116)
ALT SERPL W P-5'-P-CCNC: 11 U/L (ref 12–78)
ANION GAP SERPL CALCULATED.3IONS-SCNC: 3 MMOL/L (ref 4–13)
AST SERPL W P-5'-P-CCNC: 15 U/L (ref 5–45)
BASOPHILS # BLD AUTO: 0.05 THOUSANDS/ÂΜL (ref 0–0.1)
BASOPHILS NFR BLD AUTO: 1 % (ref 0–1)
BILIRUB SERPL-MCNC: 0.6 MG/DL (ref 0.2–1)
BUN SERPL-MCNC: 17 MG/DL (ref 5–25)
CALCIUM SERPL-MCNC: 9 MG/DL (ref 8.3–10.1)
CHLORIDE SERPL-SCNC: 106 MMOL/L (ref 96–108)
CHOLEST SERPL-MCNC: 181 MG/DL
CO2 SERPL-SCNC: 27 MMOL/L (ref 21–32)
CREAT SERPL-MCNC: 1.05 MG/DL (ref 0.6–1.3)
EOSINOPHIL # BLD AUTO: 0.12 THOUSAND/ÂΜL (ref 0–0.61)
EOSINOPHIL NFR BLD AUTO: 2 % (ref 0–6)
ERYTHROCYTE [DISTWIDTH] IN BLOOD BY AUTOMATED COUNT: 13.4 % (ref 11.6–15.1)
GFR SERPL CREATININE-BSD FRML MDRD: 72 ML/MIN/1.73SQ M
GLUCOSE P FAST SERPL-MCNC: 117 MG/DL (ref 65–99)
HCT VFR BLD AUTO: 39.1 % (ref 36.5–49.3)
HDLC SERPL-MCNC: 55 MG/DL
HGB BLD-MCNC: 12.9 G/DL (ref 12–17)
IMM GRANULOCYTES # BLD AUTO: 0.02 THOUSAND/UL (ref 0–0.2)
IMM GRANULOCYTES NFR BLD AUTO: 0 % (ref 0–2)
LDLC SERPL CALC-MCNC: 109 MG/DL (ref 0–100)
LYMPHOCYTES # BLD AUTO: 1.28 THOUSANDS/ÂΜL (ref 0.6–4.47)
LYMPHOCYTES NFR BLD AUTO: 20 % (ref 14–44)
MCH RBC QN AUTO: 30.3 PG (ref 26.8–34.3)
MCHC RBC AUTO-ENTMCNC: 33 G/DL (ref 31.4–37.4)
MCV RBC AUTO: 92 FL (ref 82–98)
MONOCYTES # BLD AUTO: 0.56 THOUSAND/ÂΜL (ref 0.17–1.22)
MONOCYTES NFR BLD AUTO: 9 % (ref 4–12)
NEUTROPHILS # BLD AUTO: 4.37 THOUSANDS/ÂΜL (ref 1.85–7.62)
NEUTS SEG NFR BLD AUTO: 68 % (ref 43–75)
NONHDLC SERPL-MCNC: 126 MG/DL
NRBC BLD AUTO-RTO: 0 /100 WBCS
PLATELET # BLD AUTO: 172 THOUSANDS/UL (ref 149–390)
PMV BLD AUTO: 9.8 FL (ref 8.9–12.7)
POTASSIUM SERPL-SCNC: 4.4 MMOL/L (ref 3.5–5.3)
PROT SERPL-MCNC: 8.1 G/DL (ref 6.4–8.4)
PSA SERPL-MCNC: 0.2 NG/ML (ref 0–4)
RBC # BLD AUTO: 4.26 MILLION/UL (ref 3.88–5.62)
SODIUM SERPL-SCNC: 136 MMOL/L (ref 135–147)
T4 FREE SERPL-MCNC: 0.68 NG/DL (ref 0.76–1.46)
TRIGL SERPL-MCNC: 83 MG/DL
TSH SERPL DL<=0.05 MIU/L-ACNC: 5.94 UIU/ML (ref 0.45–4.5)
WBC # BLD AUTO: 6.4 THOUSAND/UL (ref 4.31–10.16)

## 2022-12-29 DIAGNOSIS — G20 PARKINSON'S DISEASE (HCC): ICD-10-CM

## 2022-12-29 NOTE — TELEPHONE ENCOUNTER
Patient came to office, says has questions regarding meds  Please give the patient a call whenever possible

## 2022-12-29 NOTE — TELEPHONE ENCOUNTER
LVM for pt to call back and to leave detailed message of question and which medication this is in regards to  Awaiting call back

## 2022-12-30 ENCOUNTER — TELEPHONE (OUTPATIENT)
Dept: NEUROLOGY | Facility: CLINIC | Age: 67
End: 2022-12-30

## 2022-12-30 NOTE — TELEPHONE ENCOUNTER
Pt called to see if he can switch appt for 1-4-23 to virtual because she can't get off from work  I advised him that I will send a message to the provider and her MA to confirm if I can switch his appt type and give him a call back

## 2022-12-30 NOTE — TELEPHONE ENCOUNTER
Received VM transcription:    Danae this is Yobani Achilles  I'm playing phone tag with Almita's clinical team regarding prescriptions  Basically I want to try comparison shop for price with the rasaginline and and the entacapone  If you could put through 90 day prescriptions for both of them to [facility] on [address], that would be great  That way once they are through I can get a price from them  I may still stay with [facility]  [facility]  if they're better  But let's try a 90 day prescription for both of them to [facility] pharmacy on [address] at this point  Let me know  I also still want to talk to [facility] about stem cell and about brain stimulation for this since we had to delay our appointment with her  Have her give me a call regarding those 3 issues  But in the meantime if you can get that done at [address]  Please give me a call that it's indeed done [phone]  Thanks   --------------------------------------------------------    Called pt to see if he would be available to see [facility] [place] 1/4/2023 at 9:30  Pt states that he cancelled that very appt but would like to take it back so he can discuss other treatments available with [facility] - Rx entered to be sent to different pharmacy  Please review and sign if in agreement

## 2023-01-03 RX ORDER — ENTACAPONE 200 MG/1
200 TABLET ORAL 4 TIMES DAILY
Qty: 120 TABLET | Refills: 8 | Status: SHIPPED | OUTPATIENT
Start: 2023-01-03

## 2023-01-03 RX ORDER — RASAGILINE 1 MG/1
1 TABLET ORAL DAILY
Qty: 90 TABLET | Refills: 3 | Status: SHIPPED | OUTPATIENT
Start: 2023-01-03

## 2023-01-03 NOTE — TELEPHONE ENCOUNTER
Melanie Henson PA-C  [facility] Clinical Team 6 5 minutes ago (9:33 AM)     I am okay with this AS long as he is doing well  Melissa Cano!!

## 2023-01-04 ENCOUNTER — TELEPHONE (OUTPATIENT)
Dept: NEUROLOGY | Facility: CLINIC | Age: 68
End: 2023-01-04

## 2023-01-04 NOTE — TELEPHONE ENCOUNTER
LMOM for pt regarding requested virtual appt   Asked pt to call us back to set up virtual appt for today to follow up with sonja

## 2023-01-18 DIAGNOSIS — G20 PARKINSON'S DISEASE (HCC): ICD-10-CM

## 2023-01-19 RX ORDER — ENTACAPONE 200 MG/1
200 TABLET ORAL 4 TIMES DAILY
Qty: 360 TABLET | Refills: 3 | Status: SHIPPED | OUTPATIENT
Start: 2023-01-19

## 2023-02-21 ENCOUNTER — TELEPHONE (OUTPATIENT)
Dept: NEUROLOGY | Facility: CLINIC | Age: 68
End: 2023-02-21

## 2023-02-21 NOTE — TELEPHONE ENCOUNTER
Recd :    Hi, I'm sorry, this is Caren Singh, [birth year] is the birthdate  I did not get my prescription picked up in time to before I left vacation  This is for the 2 prescriptions that are normally at [facility] pharmacy  t  So I will be short on  5 days  each of them  And I really need a bridge prescription sent to [facility]  cb [phone]    I returned call; reached , left detailed message that prescriptions for entacapone and rasagiline were sent in January to [facility] [address]  Please call back if need those prescriptions redirected and will also need the name and address of pharmacy and qty needed  Patient of Kay Minor

## 2023-02-22 DIAGNOSIS — G20 PARKINSON'S DISEASE (HCC): ICD-10-CM

## 2023-02-22 RX ORDER — ENTACAPONE 200 MG/1
200 TABLET ORAL 4 TIMES DAILY
Qty: 20 TABLET | Refills: 0 | Status: SHIPPED | OUTPATIENT
Start: 2023-02-22

## 2023-02-22 RX ORDER — RASAGILINE 1 MG/1
1 TABLET ORAL DAILY
Qty: 5 TABLET | Refills: 0 | Status: SHIPPED | OUTPATIENT
Start: 2023-02-22

## 2023-02-22 NOTE — TELEPHONE ENCOUNTER
Patient called in and is away in [facility] and will be short on his medications  # [phone]    I called patient back, and he will be short 5 tabs of Rasigiline 1 mg tabs  And 20 tabs of Entacapone 200 mg tabs  Did not  his refills at home pharmacy before he left for [place], [place]  Patient is asking to send temporary supply to [address] [address] [facility]  [place] [address] [place] [address]  #[phone]  Fax# [phone]  Pharmacy updated in patient's chart  Please send temporary supply for patient if agreeable

## 2023-03-22 ENCOUNTER — OFFICE VISIT (OUTPATIENT)
Dept: NEUROLOGY | Facility: CLINIC | Age: 68
End: 2023-03-22

## 2023-03-22 VITALS
DIASTOLIC BLOOD PRESSURE: 60 MMHG | BODY MASS INDEX: 34.33 KG/M2 | RESPIRATION RATE: 16 BRPM | SYSTOLIC BLOOD PRESSURE: 120 MMHG | HEART RATE: 81 BPM | OXYGEN SATURATION: 99 % | TEMPERATURE: 97.8 F | WEIGHT: 225.8 LBS

## 2023-03-22 DIAGNOSIS — G20 PARKINSON'S DISEASE (HCC): ICD-10-CM

## 2023-03-22 RX ORDER — ENTACAPONE 200 MG/1
200 TABLET ORAL 4 TIMES DAILY
Qty: 120 TABLET | Refills: 5 | Status: SHIPPED | OUTPATIENT
Start: 2023-03-22

## 2023-03-22 RX ORDER — CARBIDOPA AND LEVODOPA 50; 200 MG/1; MG/1
1 TABLET, EXTENDED RELEASE ORAL
Qty: 90 TABLET | Refills: 3 | Status: SHIPPED | OUTPATIENT
Start: 2023-03-22

## 2023-03-22 RX ORDER — RASAGILINE 1 MG/1
1 TABLET ORAL DAILY
Qty: 30 TABLET | Refills: 5 | Status: SHIPPED | OUTPATIENT
Start: 2023-03-22

## 2023-03-22 NOTE — PROGRESS NOTES
Patient ID: Carlos Gonzales is a 77 y o  male  Assessment/Plan:    [facility]  Patient with levodopa responsive Parkinson's disease  Overall he has noticed some slight progression since his last visit  At this time he does get a good on with the medication however he will feel it wear off about 30 minutes prior to his next dose  On exam today he continues to have left greater than right bradykinesia as well as rigidity  Tremors are well controlled  We had a long discussion in regards to treatment options for his Parkinson symptoms at this time  Given his continued issues with wearing off he was interested in some of the surgical procedures available at this time  We discussed both Duopa as well as the DBS surgery  Goal of DBS would be to help with his wearing off between doses  We did discuss that the surgery itself would be helpful for the same symptoms that he feels improved when he is on with his Sinemet  At this time he is interested in pursuing DBS surgery and because of this we will start the work-up  We will first send him for formal neuropsych testing  We will also schedule him with formal on off testing with Dr Luis Ly  On the day of this appointment he will come in without taking his Parkinson's medications that morning however he will bring them with him to the office so that he can take them here  In the meantime we also discussed the option of making some adjustments to his medication  We will first have him start by taking Sinemet 2 tabs 6 AM, 1 5 tabs 930, 2 tabs 1 PM, 1 5 tabs for 30  He will take this dosing for the next 1 to 2 weeks  If tolerated and no side effects he can further increase to 2 tabs each dose throughout the day  He will watch for any side effects including hallucinations, dyskinesia, dizziness when standing, obsessive-compulsive behaviors or excessive sleepiness  He will call with any of the side effects and we will need to reduce back down  Could however consider a trial of Rytary instead to see if perhaps this would help with the wearing off without causing side effects  For now he will also remain on his current dose of Sinemet CR before bed as well as Azilect and entacapone  He was encouraged to remain active  He does continue to work 40 hours a week  He recently did complete physical therapy which he found to be very helpful  He would be interested in restarting this now  We will make a referral for physical therapy as well as speech therapy at this time  Subjective:    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 in follow up for levodopa-responsive Parkinson's disease  Previously followed by Dr Jose Roberto Petersen  Review of chart reveals, symptom onset in 2017 (62yo) with stooped posture, imbalance and slowed gait  His course is complicated by wearing off  MRI brain, PTH and Jimenez's labs were sent given his relatively young age at onset  His MRI brain showed mild white matter disease  Ceruloplasmin was within normal limits  24 hour urine copper was elevated at 53 (upper limit of normal is 35)  Repeat 24 hour urine was within normal limits  At his last visit he was overall stable, no changes made  He was referred to physical and speech therapy         INTERVAL HISTORY:  He completed PT  He did find PT to be helpful   Speech therapy was hard to get scheduled   He is still working 40 hours a day, he feels that he can still manage his work duties   He is overall slower with his ADLs however he can still perform them on his own   No issues with swallowing   Sleeping better with Sinemet CR  He has to stay well hydrated, if not then he is overall worse   He will notice the medication start wearing off around 3 5 hours   He had one episode when he started to see double when driving home from a long shift from work, this only happened once   He feels that overall his memory is still good, may need to concentrate more at times   No hallucinations   No tremors   Overall he feels that things are a little worse in regards to the slowness     Current medications and timing:  Sinemet 25/100 1 5 tab 4x daily (3 5hours 6, 9:30, 1pm, 4:30)  Sinemet CR 50/200mg 1tab qhs   Azilect 1mg daily (helped wearing off)  Entacapone 200mg 4 x daily      I personally reviewed and updated the ROS  Objective:    Blood pressure 120/60, pulse 81, temperature 97 8 °F (36 6 °C), temperature source Temporal, resp  rate 16, weight 102 kg (225 lb 12 8 oz), SpO2 99 %  Physical Exam  Constitutional:       Appearance: Normal appearance  HENT:      Right Ear: Hearing normal       Left Ear: Hearing normal    Eyes:      Extraocular Movements: Extraocular movements intact  Pupils: Pupils are equal, round, and reactive to light  Pulmonary:      Effort: Pulmonary effort is normal    Neurological:      Mental Status: He is alert  Motor: Motor strength is normal          Neurological Exam  Mental Status  Alert  Oriented to person, place, time and situation  Speech: hypophonia  Language is fluent with no aphasia  Attention and concentration are normal     Cranial Nerves  CN III, IV, VI: Extraocular movements intact bilaterally  Pupils equal round and reactive to light bilaterally  CN V:  Right: Facial sensation is normal   Left: Facial sensation is normal on the left  CN VII:  Right: There is no facial weakness  Left: There is no facial weakness  CN VIII:  Right: Hearing is normal   Left: Hearing is normal   CN XI: Shoulder shrug strength is normal     Motor   Increased muscle tone  Strength is 5/5 throughout all four extremities  Sensory  Light touch is normal in upper and lower extremities       Coordination  Right: Finger-to-nose normal  Rapid alternating movement abnormality:Left: Finger-to-nose normal  Rapid alternating movement abnormality:  See MDS UPDRS III    No tremors noted      Gait  Casual gait: Unable to rise from chair without using arms  Right knee bows in (related to arthritis)  Reduced stride  Left tilt   Using walking cane   UPDRS motor:     3/22/23 8/10/22                              Time since last dose:   3 hrs     Speech  2 2   Facial Expression  - -   Rigidity - Neck  2 2   Rigidity - Upper Extremity (Right)  1 1   Rigidity - Upper Extremity (Left)   2 2   Rigidity - Lower Extremity (Right)  2 2   Rigidity - Lower Extremity (Left)   2 2   Finger Taps (Right)   1 1   Finger Taps (Left)   2 2   Hand Movement (Right)  1 1   Hand Movement (Left)   2 2   Pronation/Supination (Right)  2 2   Pronation/Supination (Left)   2-3 2-3   Toe Tapping (Right) 1 1   Toe Tapping (Left) 1 1   Leg Agility (Right)  1 2   Leg Agility (Left)   2 2   Arising from Chair   2 2   Gait   2 2   Freezing of Gait 0 0   Postural Stability         Posture 3 3   Global spontaneity of movement 2 2   Postural Tremor (Right) 0 0   Postural Tremor (Left) 0 0   Kinetic Tremor (Right)  0 0   Kinetic Tremor (Left)  0 0   Rest tremor amplitude RUE 0 0   Rest tremor amplitude LUE 0 0   Rest tremor amplitude RLE 0 0   Reset tremor amplitude LLE 0 0   Lip/Jaw Tremor        Consistency of tremor 0 0   Motor Exam Total:              ROS:    Review of Systems   Constitutional: Positive for fatigue  Negative for appetite change and fever  HENT: Negative  Negative for hearing loss, tinnitus, trouble swallowing and voice change  Eyes: Negative  Negative for photophobia, pain and visual disturbance  Respiratory: Negative  Negative for shortness of breath  Cardiovascular: Negative  Negative for palpitations  Gastrointestinal: Negative  Negative for nausea and vomiting  Endocrine: Negative  Negative for cold intolerance  Genitourinary: Negative  Negative for dysuria, frequency and urgency  Musculoskeletal: Positive for gait problem  Negative for myalgias and neck pain  Skin: Negative  Negative for rash  Allergic/Immunologic: Negative  Neurological: Positive for tremors  Negative for dizziness, seizures, syncope, facial asymmetry, speech difficulty, weakness, light-headedness, numbness and headaches  Hematological: Negative  Does not bruise/bleed easily  Psychiatric/Behavioral: Negative  Negative for confusion, hallucinations and sleep disturbance

## 2023-03-22 NOTE — PATIENT INSTRUCTIONS
Patient with levodopa responsive Parkinson's disease  Overall he has noticed some slight progression since his last visit  At this time he does get a good on with the medication however he will feel it wear off about 30 minutes prior to his next dose  On exam today he continues to have left greater than right bradykinesia as well as rigidity  Tremors are well controlled  We had a long discussion in regards to treatment options for his Parkinson symptoms at this time  Given his continued issues with wearing off he was interested in some of the surgical procedures available at this time  We discussed both Duopa as well as the DBS surgery  I do feel that he would be a candidate for DBS to help with his wearing off between doses  We did discuss that the surgery itself would be helpful for the same symptoms that he feels improved when he is on with his Sinemet  At this time he is interested in pursuing DBS surgery and because of this we will start the work-up  We will first send him for formal neuropsych testing  We will also schedule him with formal on off testing with Dr Carlin Patel  On the day of this appointment he will come in without taking his Parkinson's medications that morning however he will bring them with him to the office so that he can take them here  In the meantime we also discussed the option of making some adjustments to his medication  We will first have him start by taking Sinemet 2 tabs 6 AM, 1 5 tabs 930, 2 tabs 1 PM, 1 5 tabs for 30  He will take this dosing for the next 1 to 2 weeks  If tolerated and no side effects he can further increase to 2 tabs each dose throughout the day  He will watch for any side effects including hallucinations, dyskinesia, dizziness when standing, obsessive-compulsive behaviors or excessive sleepiness  He will call with any of the side effects and we will need to reduce back down    Could however consider a trial of Rytary instead to see if perhaps this would help with the wearing off without causing side effects  For now he will also remain on his current dose of Sinemet CR before bed as well as Azilect and entacapone  He was encouraged to remain active  He does continue to work 40 hours a week  He recently did complete physical therapy which he found to be very helpful  He would be interested in restarting this now  We will make a referral for physical therapy as well as speech therapy at this time

## 2023-03-22 NOTE — ASSESSMENT & PLAN NOTE
Patient with levodopa responsive Parkinson's disease  Overall he has noticed some slight progression since his last visit  At this time he does get a good on with the medication however he will feel it wear off about 30 minutes prior to his next dose  On exam today he continues to have left greater than right bradykinesia as well as rigidity  Tremors are well controlled  We had a long discussion in regards to treatment options for his Parkinson symptoms at this time  Given his continued issues with wearing off he was interested in some of the surgical procedures available at this time  We discussed both Duopa as well as the DBS surgery  Goal of DBS would be to help with his wearing off between doses  We did discuss that the surgery itself would be helpful for the same symptoms that he feels improved when he is on with his Sinemet  At this time he is interested in pursuing DBS surgery and because of this we will start the work-up  We will first send him for formal neuropsych testing  We will also schedule him with formal on off testing with Dr Alvaro Kessler  On the day of this appointment he will come in without taking his Parkinson's medications that morning however he will bring them with him to the office so that he can take them here  In the meantime we also discussed the option of making some adjustments to his medication  We will first have him start by taking Sinemet 2 tabs 6 AM, 1 5 tabs 930, 2 tabs 1 PM, 1 5 tabs for 30  He will take this dosing for the next 1 to 2 weeks  If tolerated and no side effects he can further increase to 2 tabs each dose throughout the day  He will watch for any side effects including hallucinations, dyskinesia, dizziness when standing, obsessive-compulsive behaviors or excessive sleepiness  He will call with any of the side effects and we will need to reduce back down    Could however consider a trial of Rytary instead to see if perhaps this would help with the wearing off without causing side effects  For now he will also remain on his current dose of Sinemet CR before bed as well as Azilect and entacapone  He was encouraged to remain active  He does continue to work 40 hours a week  He recently did complete physical therapy which he found to be very helpful  He would be interested in restarting this now  We will make a referral for physical therapy as well as speech therapy at this time

## 2023-03-23 DIAGNOSIS — G20 PARKINSON'S DISEASE (HCC): ICD-10-CM

## 2023-04-03 ENCOUNTER — TELEPHONE (OUTPATIENT)
Dept: NEUROLOGY | Facility: CLINIC | Age: 68
End: 2023-04-03

## 2023-04-03 NOTE — TELEPHONE ENCOUNTER
Patient left voicemail in regards to DBS surgery workup  Has not yet heard from neuropsych in regards to testing  Requesting call back with assistance/further guidance  [phone]  Call placed to neuropsych - left patient contact information on voicemail requesting they reach out to patient asap  Call returned to patient, acknowledged voicemail received and provided him the number for neuropsych

## 2023-04-04 ENCOUNTER — OFFICE VISIT (OUTPATIENT)
Dept: DENTISTRY | Facility: CLINIC | Age: 68
End: 2023-04-04

## 2023-04-04 VITALS — TEMPERATURE: 98.7 F | SYSTOLIC BLOOD PRESSURE: 121 MMHG | HEART RATE: 71 BPM | DIASTOLIC BLOOD PRESSURE: 71 MMHG

## 2023-04-04 DIAGNOSIS — S02.5XXA CLOSED BROKEN TOOTH WITH COMPLICATION, INITIAL ENCOUNTER: ICD-10-CM

## 2023-04-04 DIAGNOSIS — K08.89 LOSS OF RETENTION OF DENTAL CROWN: Primary | ICD-10-CM

## 2023-04-04 NOTE — PROGRESS NOTES
Pt presents for emergency visit due to lost crown on tooth #5  ASA II- [facility]    Radiographs from 2021 show large open margin on the mesial     Evaluated the crown and tooth, tooth is fractured due to decay, crown retained part of the crown  Consulted with Dr Vinod Kelley  Tooth is non restorable and requires ext  Could not be done today due to time constraint    Explained that pt may benefit from U-RPD  Pt mention that he is not satisfied with the fit of his L-RPD and has been having issues with it since the teeth were added to it at our clinic  New lower partials were recommended by previous resident  Explained that at [facility] we will tx plan for new partials and ext #5   Pt dismissed ambulatory     NV: ext #5 and tx plan dentures

## 2023-04-05 ENCOUNTER — TELEPHONE (OUTPATIENT)
Dept: PSYCHIATRY | Facility: CLINIC | Age: 68
End: 2023-04-05

## 2023-04-10 NOTE — TELEPHONE ENCOUNTER
Received  transcription:    Hi, this is Dr Dao Deutsch  I saws Arturshivani last week  I just wanted double check  I still am playing phone tag with neuropsychiatry to get the pre brain stimulation evaluation  Give me a call [phone], or have them give me a call again  Once again, this is for the pre-evaluation on brain stimulation, the DBS for parkinson's  My name is Miley Aldridge calling your office, calling because you are going to be referring me to neuropsychiatry for the dementia workup scheduled  Thanks   --------------------------------------------------------    Called Neuropsych and LVM for them to call pt at earliest convenience  Called pt, no answer  Unable to leave  as mailbox is full

## 2023-04-24 ENCOUNTER — TELEPHONE (OUTPATIENT)
Dept: PSYCHIATRY | Facility: CLINIC | Age: 68
End: 2023-04-24

## 2023-04-24 NOTE — TELEPHONE ENCOUNTER
Scheduled intake and testing appointment       Intake : 06/20/2023 at 9 am  Testing : 06/27/2023 at 8:30 am

## 2023-04-26 ENCOUNTER — TELEPHONE (OUTPATIENT)
Dept: PSYCHIATRY | Facility: CLINIC | Age: 68
End: 2023-04-26

## 2023-05-01 ENCOUNTER — OFFICE VISIT (OUTPATIENT)
Dept: DENTISTRY | Facility: CLINIC | Age: 68
End: 2023-05-01

## 2023-05-01 VITALS — HEART RATE: 78 BPM | TEMPERATURE: 98 F | SYSTOLIC BLOOD PRESSURE: 89 MMHG | DIASTOLIC BLOOD PRESSURE: 55 MMHG

## 2023-05-01 DIAGNOSIS — K08.89 NON-RESTORABLE TOOTH: Primary | ICD-10-CM

## 2023-05-01 DIAGNOSIS — K03.6 DENTAL CALCULUS: ICD-10-CM

## 2023-05-01 DIAGNOSIS — K03.6 ACCRETIONS ON TEETH: ICD-10-CM

## 2023-05-01 DIAGNOSIS — Z01.20 ENCOUNTER FOR DENTAL EXAMINATION: ICD-10-CM

## 2023-05-01 NOTE — DENTAL PROCEDURE DETAILS
Stephen Mcgowan presents for a Periodic exam  Verbal consent for treatment given in addition to the forms  Reviewed health history - Patient is ASA III  Consents signed: Yes     Perio: Gingivitis and Gingival inflammation # margins of anterior crowns  Pain Scale: 0  Caries Assessment: High  Radiographs: Periaplical tooth #5  crown fell off non restorable  needs ext      Oral Hygiene instruction reviewed and given  Recommended Hygiene recall visits with  Ranjeet Marin adjust lower RPD   we added teeth to an older partial and it finds it rocks as the day goes on        difficult case due to tongue tie and large anterior lingual alvin   advised patient to let us know if it is still a problem         Prognosis is Good  Referrals needed: given for OS to have surgical ext # 5  pax and referral given  Next Visit:  6 mos recall  [address] if patient is interested in max   RPD to replace missing teeth UR

## 2023-05-01 NOTE — DENTAL PROCEDURE DETAILS
Prophylaxis completed with  hand instrumentation  Soft plaque removed and supragingival calculus removed   hypo salivary function noted likely due to medications  Polished with prophy cup and paste  Flossed and provided Oral Health Instructions  Demonstrated proper brushing and flossing technique  Patient left satisfied and ambulatory    SEE EXAM NOTE

## 2023-05-04 ENCOUNTER — TELEPHONE (OUTPATIENT)
Dept: DENTISTRY | Facility: CLINIC | Age: 68
End: 2023-05-04

## 2023-05-10 ENCOUNTER — TELEPHONE (OUTPATIENT)
Dept: PSYCHIATRY | Facility: CLINIC | Age: 68
End: 2023-05-10

## 2023-05-10 NOTE — TELEPHONE ENCOUNTER
Left a message to return call to schedule intake and testing appointment  An earlier availably is available

## 2023-05-11 ENCOUNTER — TELEPHONE (OUTPATIENT)
Dept: PSYCHIATRY | Facility: CLINIC | Age: 68
End: 2023-05-11

## 2023-05-18 ENCOUNTER — TELEPHONE (OUTPATIENT)
Dept: PSYCHIATRY | Facility: CLINIC | Age: 68
End: 2023-05-18

## 2023-05-26 ENCOUNTER — TELEPHONE (OUTPATIENT)
Dept: NEUROLOGY | Facility: CLINIC | Age: 68
End: 2023-05-26

## 2023-05-26 DIAGNOSIS — G20 PARKINSON'S DISEASE (HCC): ICD-10-CM

## 2023-05-26 RX ORDER — RASAGILINE 1 MG/1
1 TABLET ORAL DAILY
Qty: 5 TABLET | Refills: 0 | Status: SHIPPED | OUTPATIENT
Start: 2023-05-26

## 2023-05-26 NOTE — TELEPHONE ENCOUNTER
Pt came into office and requested medication refill on  Rasagiline 1mg by mouth daily  Pt states hes going away for the holiday and needs this medication sent jorge on [address] [place]  Pt needs quantity of 5 tabs  Will  normal RX next week, in [address]

## 2023-05-30 ENCOUNTER — OFFICE VISIT (OUTPATIENT)
Dept: PSYCHIATRY | Facility: CLINIC | Age: 68
End: 2023-05-30

## 2023-05-30 DIAGNOSIS — Z00.8 PRE-SURGICAL PSYCHOLOGICAL ASSESSMENT, ENCOUNTER FOR: Primary | ICD-10-CM

## 2023-05-30 DIAGNOSIS — G20 PARKINSON'S DISEASE (HCC): ICD-10-CM

## 2023-05-30 DIAGNOSIS — I10 PRIMARY HYPERTENSION: ICD-10-CM

## 2023-05-30 PROCEDURE — NC001 PR NO CHARGE: Performed by: CLINICAL NEUROPSYCHOLOGIST

## 2023-05-30 PROCEDURE — NC001 PR NO CHARGE

## 2023-05-31 NOTE — PSYCH
Winston Carrillo was seen by jennifer Leon, neuropsychometrist, for a neuropsychological assesment on 05/30/2023

## 2023-06-20 ENCOUNTER — TELEPHONE (OUTPATIENT)
Dept: PSYCHIATRY | Facility: CLINIC | Age: 68
End: 2023-06-20

## 2023-06-22 NOTE — TELEPHONE ENCOUNTER
Returned patient vm regarding scheduling a feedback appointment   Offered 6/28/23 and 6/29/23 between 9:30a and 2:00p either in person or virtual

## 2023-06-29 ENCOUNTER — TELEPHONE (OUTPATIENT)
Dept: PSYCHIATRY | Facility: CLINIC | Age: 68
End: 2023-06-29

## 2023-06-29 ENCOUNTER — OFFICE VISIT (OUTPATIENT)
Dept: PSYCHIATRY | Facility: CLINIC | Age: 68
End: 2023-06-29

## 2023-06-29 DIAGNOSIS — G20 PARKINSON'S DISEASE (HCC): ICD-10-CM

## 2023-06-29 DIAGNOSIS — I10 PRIMARY HYPERTENSION: ICD-10-CM

## 2023-06-29 DIAGNOSIS — Z00.8 PRE-SURGICAL PSYCHOLOGICAL ASSESSMENT, ENCOUNTER FOR: Primary | ICD-10-CM

## 2023-06-29 NOTE — PSYCH
Feedback following neuropsychological evaluation:  Provided feedback to patient from neuropsychological evaluation completed on 5/30/23  Discussed moderately deficient processing speed, mildly deficient visual reasoning and memory, low average executive functioning, and average (lower end) attention and verbal reasoning, all of which likely reflect declines in varying degrees as compared to premorbid levels  Discussed diagnosis of MCI, with likely PD etiology, but possible vascular and affective contributions as well  Reviewed emotional data; patient agreed that mild depressive symptoms fit his experience and he discussed plans to consider initiation of antidepressant with his medical providers  Towards the end of the visit, patient divulged that he had trouble learning 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 workplace  He also demonstrated difficulty logging into a virtual work meeting  Patient inquired of undersigned whether he should be considering longterm; discussed this in the context of the neuropsychological data while also encouraging patient to discuss with his routine medical providers, family, etc  Report routed to Dr Elian Goel, Ms Leticia Fields, and PCP per patient request; patient is scheduled for neurological follow-up on 8/7/23  Valerie Godoy  Clinical Neuropsychologist    Billing notes:  33302: 1 unit (Interview 77 min  98862: 1 unit (Neuropsychologist Scoring 44 min)  [phone]: 1 unit; 18528: 7 units (Psychometrist Testing/Scoring 238 min)  96295: 1 unit; 96641: 2 units (Chart Review/Interpretation/Feedback 186 min)

## 2023-06-29 NOTE — PSYCH
NEUROPSYCHOLOGY EVALUATION    Name:    Vinny Llanes  YOB: 1955  Age:    77  Date of Service:  5/30/2023  Referred By:   Colby Moreno PA-C; MD [address]  Vinny Llanes is a 61-year-old right-handed  male referred by Colby Moreno PA-C, for a neuropsychological evaluation prior to consideration of DBS for PD     CHIEF COMPLAINT  Patient reported experiencing “occasional loss of good recall” about once weekly  For example, it recently took him 1 5 minutes to recall the name of the lead nathan for [employer]  He may occasionally, about weekly, have an instance of word-finding difficulty and will use google  He denied pervasive difficulty with names or misplacing objects  He reported being able to focus when necessary  He stated that he is more concerned presently about PD affecting his night driving than he is about cognitive sequelae  He also mentioned that he and his wife discuss that they are both “slowing down” in regard to functioning and memory, but seemingly no more than their peers  Functionally, patient manages ADLs and IADLs independently, albeit reportedly slower  He denied problems with driving  He acknowledged forgetting 2-3 items when shopping as he does not bring a list  He previously cooked, but it takes so much longer now, so wife and son do most of the cooking now  He denied confusion operating appliances and technology, including smartphone  He manages finances without difficulty  He admitted to neglecting to take doses of Sinemet when distracted at work  He uses a written calendar for schedule management  He once went to the wrong location for work about two years ago; 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 previously 7 locations      Past Medical History:   Diagnosis Date   • Arthritis    • Hypertension    • Parkinson's disease ([facility] [address] )     2 years ago   • Psoriasis          Current Outpatient Medications:   • "APPLE CIDER VINEGAR PO, Take by mouth in the morning (Patient not taking: Reported on 5/1/2023), Disp: , Rfl:   •  B-D 3CC LUER-MARIA L SYR 25GX1\" 25G X 1\" 3 ML MISC, BY DOES NOT APPLY ROUTE ONCE A WEEK, Disp: 10 each, Rfl: 3  •  carbidopa-levodopa (SINEMET CR)  mg per tablet, Take 1 tablet by mouth daily at bedtime, Disp: 90 tablet, Rfl: 3  •  carbidopa-levodopa (SINEMET)  mg per tablet, TAKE 2 TABLETS BY MOUTH 4 TIMES A DAY, Disp: 720 tablet, Rfl: 3  •  cholecalciferol (VITAMIN D3) 1,000 units tablet, Take 2,000 Units by mouth daily, Disp: , Rfl:   •  docusate sodium (COLACE) 100 mg capsule, Take 100 mg by mouth daily , Disp: , Rfl:   •  entacapone (COMTAN) 200 mg tablet, Take 1 tablet (200 mg total) by mouth 4 (four) times a day Take ALONG with Sinemet, Disp: 120 tablet, Rfl: 5  •  Multiple Vitamins-Minerals (MULTIVITAMIN ADULT PO), Take 1 tablet by mouth daily, Disp: , Rfl:   •  psyllium (METAMUCIL) 58 6 % packet, Take 1 packet by mouth if needed, Disp: , Rfl:   •  rasagiline (AZILECT) 1 MG, Take 1 tablet (1 mg total) by mouth daily, Disp: 5 tablet, Rfl: 0  •  testosterone cypionate (DEPO-TESTOSTERONE) 200 mg/mL SOLN, INJECT 0 5ML IM ONCE WEEKLY  (Patient not taking: Reported on 3/22/2023), Disp: 10 mL, Rfl: 4  •  vardenafil (LEVITRA) 20 MG tablet, Take 1 tablet (20 mg total) by mouth daily as needed for erectile dysfunction Take 1 tablet by mouth one (1) hour prior to intercourse  Limit to only 2 tablets per week  (Patient not taking: Reported on 3/22/2023), Disp: 3 tablet, Rfl: 2      NEUROIMAGING  MRI 1/30/19 per chart  White matter changes suggestive of chronic microangiopathy  No acute intracranial pathology  PSYCHOSOCIAL HISTORY  Developmental/Educational history:  Vinny Llanes was born full-term and developed normally  He was exposed to JEVON in utero  He was raised in [place], [place]   He attended [employer], with “better than average” grades “across the board” without any indication of a " learning disability  Patient achieved his MD and completed a fellowship in family medicine  Occupational history:  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  He was working 60 hours/week during the pandemic  He plans to decrease to 20 hours by early   Social history:  Patient is  to his wife of 52 years  They have five adopted children, and five grandchildren  One son is living with them; he is diagnosed with schizoaffective disorder  Patient’s mother  at age 80 from pneumonia; she had PD  His father  at age 80 from heart failure  He has a younger brother, two years his marcie  Paternal grandfather may have met criteria for dementia; maternal aunt and maternal grandmother experienced cognitive difficulty in their [de-identified]  Patient named his wife and cousins as his support system  He mentioned his PD symptoms, his son’s mental health issues, and his daughter’s recent breast cancer diagnosis/treatment as current stressors  Patient is active in the community ([place], [place]) and in his [employer] and [employer]  Psychiatric history:  Patient engaged in counseling at times of “reactive depression/adjustment” in the [de-identified] and 90s, with challenges with his sons (one was in juvenile alf in 6th grade, the other with mental health challenges)  He denied being prescribed psych meds  He denied a history of psychiatric hospitalization  Patient described his current mood as “okay ” He denied symptoms of depression, anxiety, danie, agitation, or psychosis  He denied current or past SI or HI  Patient stated he has always viewed sleep as “a waste of time ” He had gotten into a pattern of waking up around 2 AM with difficulty getting back to sleep, but this has improved  He sleeps from 10 PM until 5:30 AM  When able, he takes a “power nap” in the afternoon   Patient had gained 20 pounds over the past year (and has since lost 5) which he attributed to stress as related to his wife’s multiple inpatient hospitalizations and stays in skilled nursing facilities over the past year  He is hoping to lose one pound per week  He eats more frequent, yet smaller meals and exercises as much as he is able  He expressed interest in judd chi  Alcohol/Drug use history:  Patient reported that he is a recovering alcoholic  He is sober for 9 5 years  At the most, he was consuming an average of 6 ounces of whiskey daily for 6-12 months, progressive over 10 years  He had a DUI his freshman year of college and therefore “controlled” his drinking over the next 10 years  Patient denied use of tobacco or illicit substances  BEHAVIORAL OBSERVATIONS  During the interview, Dr Mary Lau appeared appropriately groomed and dressed  He ambulated with a cane  He was alert and fully oriented  Speech volume was low, with some reduced intelligibility  Thought process was goal-directed  Affect was appropriate to session content  During the testing, patient presented with glasses and cane  He appeared very motivated and cooperated with all testing procedures  He understood test directions readily  Response style was slow, with frequent comments of “I can’t do this” requiring encouragement from the examiner  He became frustrated upon recognition of difficult task items  Word-finding difficulty was appreciated  Hearing difficulty was noted by the examiner, but improved with increased volume  Patient took PD meds at noon  TESTS ADMINISTERED  Wechsler Adult Intelligence Scale (WAIS-IV; selected subtests)  Rosas Neuropsychological Battery: Forced Choice Test; Ravin Complex Figure Test; Animal Naming; One-Minute Time Estimation; Controlled Oral Word Association Test; Dichotic Listening; [employer] Adult Reading Test (NAART); Sentence Repetition Test; Ravin Auditory Verbal Learning Test; Judgment of Line Orientation; Oscar & Oscar; Finger Tapping Test; Finger Localization; Trail Making Test A and B;  Token Test; [place] Category Test - [facility] Version  Zhang Depression Inventory (BDI-II)  Symptom Checklist (SCL-90-R)    Santhosh Bryant was administered a battery of neuropsychological measures (listed above) which assessed his abilities compared to individuals matched for age, education, gender, ethnicity, and handedness, as appropriate  The patient’s performance on multiple validity procedures indicates that the cognitive data obtained during this assessment is likely valid for interpretation  PREMORBID INTELLECTUAL FUNCTIONING  The patient’s premorbid intellectual functioning is estimated to be in the average to high average range  Performance on subsequent measures will be considered relative to this premorbid estimate  Overall cognitive performance on this evaluation was in the low average range, and slightly below his expected performance level, though there was statistically significant variability in performance across tasks  PROCESSING SPEED  Overall, cognitive speed and efficiency of information processing is in the moderately deficient range, and below his expected performance level  Graphic symbol-digit substitution was in the low average range  Timed visual scanning and simple numerical sequencing was in the impaired range  ATTENTION AND WORKING MEMORY  Overall, attention and working memory is in the lower end of the average range, and slightly below his expected performance level  Auditory attention for numeric sequences was in the average range; within the digit span task, he performed in the superior range on a basic auditory trace attention task (digit span forward) and in the average range on more complex working [place] tasks (digit span backward and sequencing)  His ability to keep information in mind when performing arithmetic problems was in the average range   The patient’s capacity to immediately recite words from a random auditory list was in the upper end of the average range  Attention for contextual auditory-verbal information was in the mildly deficient range  Sustained auditory attention, assessed by a task requiring the patient to attend to and process multiple auditory stimuli simultaneously, was in the impaired range  LANGUAGE  Overall, verbal expression and conceptualization is in the lower end of the average range, and slightly below his expected performance level  Auditory processing was in the impaired range for the left ear, and in the mildly deficient range for the right ear  Basic comprehension and execution of simple instructions was in the average range  Expression of general factual knowledge and deductive reasoning skills were both in the high average range  Confrontational naming was in the average range  Semantic fluency by category was in the low average range, and verbal fluency by starting letter was in the deficient range  VISUOSPATIAL AND CONSTRUCTIONAL FUNCTIONING   Overall, perceptual organizational abilities are in the mildly deficient range, and below his expected performance level  Visual spatial conceptual organization when manipulating blocks to form a copy of a modeled design was in the average range  Perceptual attuneness to fine visual detail of familiar stimuli was in the average range  Visuospatial judgment and estimation based on line angulation between 0 and 180 degrees was in the mildly deficient range  Visual constructional skills were in the impaired range  EXECUTIVE FUNCTIONING  Overall, reasoning and problem-solving skills are in the low average range, and slightly below his expected performance level  Mental flexibility as measured with alphanumeric set switching was in the impaired range as measured by time to completion; additionally, he committed three errors  Visual analytic reasoning and novel problem-solving was in the low average range   Verbal abstract conceptual reasoning was in the high average range  Verbal fluency by starting letter was in the deficient range  MEMORY  VERBAL MEMORY  Overall, the patient’s ability for learning, encoding, and recalling auditory verbal information was in the mildly deficient range, and below his expected performance level  For a 15-word list, the amount of information absorbed from single exposure was in the upper end of the average range (6)  Learning over five trials was in the moderately deficient range (6, 6, 5, 9, 8)  After repeated exposures, the amount of information retained was in the lower end of the average range  His recall of the original word list after distraction with a second word list was in the low average range (6); he appeared susceptible to proactive inhibition, with no words retained from the second list  After a 30-minute delay, recall was in the upper end of the low average range (6)  Recognition of the words was in the high average range (15), though discriminability was impaired (15 false positives)  VISUAL MEMORY  Overall, the patient’s ability for learning, encoding, and recalling visual-spatial information is in the mildly deficient range, and below his expected performance level  Short-term recall of a complex figure (after distraction) was in the upper end of the low average range, with retention in the upper end of the average range  Delayed recall (after 30 minutes) was in the mildly deficient range, with mildly deficient retention from the copy and impaired retention from immediate recall  Delayed recognition of components of the design was in the impaired range  SENSORY-MOTOR  Fine motor speed was in the mildly deficient range for the dominant hand and the impaired range for the non-dominant hand  Tactile perception was in the average range for both hands  EMOTIONAL FUNCTIONING  On the BDI-2, the patient scored 18, suggesting mild depressive symptomatology   He endorsed severe change in sleeping pattern; moderate past failure, self-dislike, and tiredness/fatigue; and mild pessimism, loss of pleasure, guilty feelings, punishment feelings, suicidal thoughts (without intent), loss of interest, loss of energy, and change in appetite  On a self-report questionnaire of emotional functioning over the past week (SCL-90-R), he endorsed symptoms of interpersonal alienation, depression, interpersonal sensitivity, cognitive concerns, somatic distress, and some anxiety  SUMMARY and IMPRESSIONS  Performance on this comprehensive neuropsychological evaluation revealed multiple areas of deficit relative to demographically similar peers, which likely represent cognitive decline considering his estimated average to above average premorbid intellectual functioning  Overall, processing speed was moderately deficient, and visual reasoning and memory were mildly deficient relative to his cohort  Overall, executive functioning was in the low average range, and attention and verbal reasoning were in the average range (lower end) which can be considered within normal limits relative to his cohort, but may also reflect mild decline relative to premorbid levels  Given the above data and report of independent functioning, patient meets criteria for Mild Cognitive Impairment  His cognitive pattern suggests a subcortical process, with likely PD etiology though vascular conditions and/or affective conditions are possible contributors  Cognitive difficulties lateralize to weaker right hemisphere functioning; updated imaging will assist in ruling out structural pathology  Patient’s responses to psychological questionnaires suggest mild depressive symptomatology, likely adjustment-related; patient may wish to consider another course of therapy at this time   Additionally, would recommend patient pursue audiology evaluation as there was qualitative and quantitative evidence of hearing difficulty during this evaluation, negatively affecting cognitive performance on the evaluation and perhaps negatively impacting optimal daily functioning  Patient reported regular vision examinations; in the context of pronounced difficulty especially with visual construction, further evaluation may be warranted especially as patient voiced concerns about night driving  With regard to DBS candidacy, the team will need to consider the patient’s current cognitive presentation in determining the optimal treatment plan for Dr Mitali Dillon at this time  Thank you for this referral  Please feel free to contact me with any questions  Valerie Rios  Clinical Neuropsychologist

## 2023-07-10 ENCOUNTER — TELEPHONE (OUTPATIENT)
Dept: NEUROLOGY | Facility: CLINIC | Age: 68
End: 2023-07-10

## 2023-07-10 NOTE — TELEPHONE ENCOUNTER
Last visit mariaelena casillas, 3/22    Patient reporting orthostatic hypotension and attributes to carbidopa/levodopa 2 tabs qid; by was:  106/60 lying down, sitting 98 sbp and standing 78/58 with symptoms of dizziness. Said he's been trying to adjust each dose according to bp and has been taking dose:   2 in AM  , 1 tab mid morning, 1 tab midday and 2 in PM with improvement in bp and dizziness. (2 - 1 - 1 - 2) instead of  2 qid. Please provide recommendation, thank you.

## 2023-07-10 NOTE — TELEPHONE ENCOUNTER
Received  Transcription:    Dr Elodia Mark. I was calling about some symptoms that I had last week. Actually saw my PCP. We had looked at some reasons why are I was getting dizzy and blood pressure dropping, since we raised the Sinemet last time. I turns out I that the blood pressure really was dropping when I was taking 8 tablets today. So I can so you give me a call. Also worried about some potential hallucinations you've been asking me about. [phone]. My date of birth is [birth year].  Thank you

## 2023-07-11 NOTE — TELEPHONE ENCOUNTER
Patient called back wondering as to why he never heard back from the office. Went over provider note with patient. Patient verbalized understanding but would like to continue checking blood pressure during the day and if stable, would like to stay on his current dose of the Sinemet. He would still like to speak to someone in the office regarding this. Please follow up with patient tomorrow.

## 2023-07-12 NOTE — TELEPHONE ENCOUNTER
Patient prefers to stay on sinemet 2 tabs qid; he is aware of recommendations and will advise if needs further assistance.     F/u 8/7 Dr. Rosa Alicea

## 2023-07-17 NOTE — TELEPHONE ENCOUNTER
Modesta Thakkar MD  Neurology [facility] Clinical Team 4 37 minutes ago (12:42 PM)       Recommendation would be to reduce back to   Sinemet 2 tabs 6 AM, 1.5 tabs 930, 2 tabs 1 PM, 1.5 tabs qpm.   Taking BP 4 times daily and jumping from 2 tabs to 1 tab when hypotensive would not be the best approach long term. Patient aware; verbalizes understanding and is in agreement with same; he said he will also use compression stockings and stay hydrated; not receptive to salt tablets at this time. F/7/8/7 Dr. Rhoda Smith.

## 2023-07-17 NOTE — TELEPHONE ENCOUNTER
Patient/patient's wife reporting after reducing second dose of sinemet from 2 tabs to 1 tab and cortez "tighter socks"  bp came up to 137/97 (sitting position). Reports was symptomatic at 86/60 as reported earlier (fatigue). He would like input on dosing of sinemet; he said he would like to check his bp qid and if low, wishes to take 1 tab instead of 2 tabs for that next dose (current dosing is 2 tabs qid). Please provide recommendation, thank you. He said he is not taking any antihypertensives at this time and is staying hydrated.

## 2023-07-24 DIAGNOSIS — G20 PARKINSON'S DISEASE (HCC): ICD-10-CM

## 2023-07-25 RX ORDER — ENTACAPONE 200 MG/1
200 TABLET ORAL 4 TIMES DAILY
Qty: 120 TABLET | Refills: 8 | Status: SHIPPED | OUTPATIENT
Start: 2023-07-25

## 2023-08-07 ENCOUNTER — OFFICE VISIT (OUTPATIENT)
Dept: NEUROLOGY | Facility: CLINIC | Age: 68
End: 2023-08-07
Payer: MEDICARE

## 2023-08-07 ENCOUNTER — TELEPHONE (OUTPATIENT)
Dept: NEUROLOGY | Facility: CLINIC | Age: 68
End: 2023-08-07

## 2023-08-07 VITALS
DIASTOLIC BLOOD PRESSURE: 82 MMHG | WEIGHT: 215 LBS | TEMPERATURE: 97.8 F | HEART RATE: 88 BPM | SYSTOLIC BLOOD PRESSURE: 138 MMHG | BODY MASS INDEX: 32.69 KG/M2

## 2023-08-07 DIAGNOSIS — G20 PARKINSON'S DISEASE (HCC): Primary | ICD-10-CM

## 2023-08-07 DIAGNOSIS — G47.52 REM BEHAVIORAL DISORDER: ICD-10-CM

## 2023-08-07 PROBLEM — I95.1 ORTHOSTATIC HYPOTENSION: Status: ACTIVE | Noted: 2023-08-07

## 2023-08-07 PROCEDURE — 99215 OFFICE O/P EST HI 40 MIN: CPT | Performed by: PSYCHIATRY & NEUROLOGY

## 2023-08-07 NOTE — PROGRESS NOTES
Patient ID: Gi Castro is a 77 y.o. male    Assessment/Plan:    Parkinson's disease ([address])  PD with wearing off and the development of  orthostatic hypotension after we increase levodopa. Also with increasing constipation. We discussed options including:  - Lowering/ tapering off rasagiline in an attempt to reduce orthostatic hypotension and to then see if higher doses of levodopa would be tolerated. - Switching Sinemet to Rytary. - Adding a medication such as fludrocortisone or midodrine to improve blood pressure so that levodopa could be increased. He also continues to express interest in deep insulation surgery. Time spent discussing deep brains stimulation surgery, it use in PD to treat levodopa responsive symptoms such as tremor, bradykinesia, rigidity and wearing off, the procedure and potential risks and benefits. Goals of surgery should he wish to proceed at this point would be reduce off time. We will try to reduce medication but he understands this may be limited by it need for nonmotor symptoms. It would not be expected to help with primary balance issues. Less effect if any seen with midline symptoms. Mild cognitive impairment noted on neuropsychological testing which can increase risks of surgery. DBS does not slow progression or have an effect on cognition. Decline in cognition can be seen after surgery. Will obtain updated MRI brain given changes noted. After discussion we opted to reduce rasagiline 1mg to 1/2 tab x 1 week and the discontinue. If orthostatic hypotension is less frequent we can then try increasing carbidopa/levodopa back to 2 tabs 4 times daily. Will refer for psychotherapy for depression. He wishes to have someone with experience in movement disorders.       Diagnoses and all orders for this visit:    Parkinson's disease ([address])  -     MRI brain without contrast; Future    REM behavioral disorder    I have spent a total time of 61 minutes on 08/07/23 in caring for this patient including Diagnostic results, Prognosis, Risks and benefits of tx options, Instructions for management, Patient and family education, Impressions, Documenting in the medical record, Reviewing / ordering tests, medicine, procedures  , Obtaining or reviewing history   and Communicating with other healthcare professionals . 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 Previously followed by Dr. Tariq Cortes. Review of chart reveals, symptom onset in 2017 (60yo) with stooped posture, imbalance and slowed gait. His course is complicated by wearing off. MRI brain, PTH and Jimenez's labs were sent given his relatively young age at onset. His MRI brain showed mild white matter disease. Ceruloplasmin was within normal limits. 24 hour urine copper was elevated at 53 (upper limit of normal is 35). Repeat 24 hour urine was within normal limits. Carbidopa levodopa was increased when last seen in our office given increasing symptoms and wearing off. He contacted the office in July given the development of orthostatic hypotension on Sinemet 2 tablets 4 times daily. Sinemet was reduced back toSinemet 2 tabs 6 AM, 1.5 tabs 930, 2 tabs 1 PM, 1.5 tabs qpm.     He continues to  have orthostatic hypotension and is falling a lot. He started using a walker over the past month when gait worse. He  Is in the process of cutting down his hours. Now working 30 hours a week with plans to reduce to 20 hours. He is no longer traveling to urgent cares. Overall he is slower and has more trouble with dexterity so he has noted he cannot perform efficiently at urgent care. Onset of Sinemet 15-60 minutes. Once on feels good, speech and mobility improves and he can walk short distances without walker. ADL's take longer. No dysphagia. Sleeps from 10pm to 2am. Eventually falls back to sleep. Total 4-6 hours nightly. Mild daytime sedation at times for which he naps. Infrequent RBD symptoms with occasionally talking/ yelling. He has constipation for which he was seen in the ER over the weekend for decompaction. He takes metamucil daily an ocassional ducolax. He underwent neuropsychological testing given interest in pursuing deep brain stimulation surgery. Results reviewed. It demonstrated changes consistent with mild cognitive decline likely related to PD although it was noted that vascular or effective contributions may be present. Cognitive difficulties noted to lateralize to the right hemisphere. He was noted to have moderately deficient processing speed, mildly deficient visual memory and visual reasoning. Executive function was in the low average range. Attention and verbal reasoning were average but on the lower end. Also recommended was an audiology eval given hearing deficits. Current medications and timing:  Sinemet 25/100 1.5 tab 4x daily (3.5hours 6, 9:30, 1pm, 4:30)  Sinemet CR 50/200mg 1tab qhs   Azilect 1mg daily (helped wearing off)  Entacapone 200mg 4 x daily        Objective:    /82 (BP Location: Left arm, Patient Position: Sitting, Cuff Size: Standard)   Pulse 88   Temp 97.8 °F (36.6 °C)   Wt 97.5 kg (215 lb)   BMI 32.69 kg/m²       Physical Exam  Vitals reviewed. Eyes:      Extraocular Movements: Extraocular movements intact. Neurological:      Mental Status: He is alert. Motor: Motor strength is normal.        Neurological Exam  Mental Status  Alert. Oriented to person, place, time and situation. Speech: hypophonia. Language is fluent with no aphasia. Attention and concentration are normal.    Cranial Nerves  CN III, IV, VI: Extraocular movements intact bilaterally. Right pupil: 1 mm. Left pupil: 1 mm. CN VII: Full and symmetric facial movement.   CN VIII: Hearing is normal.  CN IX, X: Palate elevates symmetrically  CN XI: Shoulder shrug strength is normal.  CN XII: Tongue midline without atrophy or fasciculations. Motor   Increased muscle tone. Strength is 5/5 throughout all four extremities. Sensory  Light touch is normal in upper and lower extremities. Coordination  Right: Finger-to-nose normal.Left: Finger-to-nose normal.  See motor UPDRS    . Gait   Unable to rise from chair without using arms. Left postural lean. Shuffling stride R>L, Freezing through doorway and with anticipation.    .      MDS UPDRS motor:     8/7/23 8/7/23                              Time since last dose:  Last night  On    40 min later   Speech  2 2   Facial Expression  2 2   Rigidity - Neck  2 0   Rigidity - Upper Extremity (Right)  1 0   Rigidity - Upper Extremity (Left)   2 0   Rigidity - Lower Extremity (Right)  3 2   Rigidity - Lower Extremity (Left)   2 2   Finger Taps (Right)   3 2   Finger Taps (Left)   2 2   Hand Movement (Right)  2 1   Hand Movement (Left)   2 1   Pronation/Supination (Right)  3 1   Pronation/Supination (Left)   3 1   Toe Tapping (Right) 1 1   Toe Tapping (Left) 2 1   Leg Agility (Right)  2 1   Leg Agility (Left)   2 1   Arising from Chair   2 2   Gait   3 walker 3   Freezing of Gait 1 0   Postural Stability       Posture 3 right lean 3   Global spontaneity of movement 2 1   Postural Tremor (Right) 0 0   Postural Tremor (Left) 0 0   Kinetic Tremor (Right)  0 0   Kinetic Tremor (Left)  0 0   Rest tremor amplitude RUE 0 0   Rest tremor amplitude LUE 0 0   Rest tremor amplitude RLE 0 0   Reset tremor amplitude LLE 0 0   Lip/Jaw Tremor  0 0   Consistency of tremor 0 0   Motor Exam Total:  47 29                  Angelia Darnell MD  Movement disorder physician  [address] [facility]

## 2023-08-07 NOTE — PATIENT INSTRUCTIONS
PD with wearing off and orthostatic hypotension: Discussed options including lowering/ tapering of rasagiline to then see if higher doses of levodopa would be tolerate. Switching Sinemet to Rytary. Adding a medication such as fludrocortisone or midodrine. Time spent discussing deep brains stimulation surgery, it use in PD to treat levodopa responsive symptoms such as tremor, bradykinesia, rigidity and wearing off, the procedure and potential risks and benefits. Goals of surgery should he wish to proceed at this point would be reduce off time. We will try to reduce medication but he understands this may be limited by it need for nonmotor symptoms. It would not be expected to help with primary balance issues. Less effect if any seen with midline symptoms. Mild cognitive impairment noted on neuropsychological testing which can increase risk of surgery. DBS does not slow progression or have an effect on cognition. Decline in cognition can be seen after surgery. Will obtain updated MRI brain given changes noted. Will reduce rasagiline 1mg to 1/2 tab x 1 week and the discontinue. If orthostatic hypotension is less frequent we can then try increasing carbidopa/levodopa back to 2 tabs 4 times daily. Will refer for psychotherapy for depression.

## 2023-08-07 NOTE — PROGRESS NOTES
Review of Systems   Constitutional: Positive for fatigue. Negative for appetite change and fever. HENT: Positive for voice change (Ongoing). Negative for hearing loss, tinnitus and trouble swallowing. Eyes: Negative. Negative for photophobia, pain and visual disturbance. Respiratory: Negative. Negative for shortness of breath. Cardiovascular: Negative. Negative for palpitations. Gastrointestinal: Negative. Negative for nausea and vomiting. Endocrine: Negative. Negative for cold intolerance. Genitourinary: Negative. Negative for dysuria, frequency and urgency. Musculoskeletal: Positive for gait problem (Shuffling feet, stumbles, freezing, all of which has gotten worse). Negative for back pain, myalgias and neck pain. Balance Issues, has stayed the same  Falling  Hypotension     Skin: Negative. Negative for rash. Allergic/Immunologic: Negative. Neurological: Positive for dizziness, speech difficulty (mumbles at times) and light-headedness. Negative for tremors, seizures, syncope, facial asymmetry, weakness, numbness and headaches. Hematological: Negative. Does not bruise/bleed easily. Psychiatric/Behavioral: Positive for sleep disturbance (Trouble staying asleep). Negative for confusion and hallucinations. All other systems reviewed and are negative.

## 2023-08-07 NOTE — LETTER
August 15, 2023        Rupal Hodge  [address] [facility] [address]      Our office has been unsuccessful in trying to reach you by phone regarding:        ? Other:_______list of in network psychologist__________      Please review list of in network psychologists with Medicare, if you have any questions please contact  Nola Olson at [phone]. [facility]  [address] [facility] Joey  [address] [facility]  [place], [address]  [phone]  Specialties  Clinical psychologist     Zena CANTU [address] [facility], [address]  [phone]  Specialties  Clinical psychologist     Bosie Prader  [address] [facility], [address]  [phone]  Specialties  Clinical psychologist     [address] [facility]  [address]  [address] [facility] [address]  [place], [address]  [phone]  Specialties  Clinical psychologist     [address] [facility] [address] PSYCHOLOGY  [address] [facility]  1 [facility] ,[address]  [place], [address]  [phone]  Specialties  Clinical psychologist     Mary Galex [facility]  [address]  27[address] [facility], [address]  [phone]  Specialties  Clinical psychologist     Fozia Henry  [address] [facility], [address]  [phone]  Specialties  Clinical psychologist     [address] [place] [address] Street  [address] [place], [address]  [phone]  Specialties  Clinical psychologist     [address] [place] [address]  PSYCHOLOGY [facility]OC[facility]ATES  [address]  [address] [place], [address]  [phone]  Specialties  Clinical psychologist     [facility]  PS[facility]OLOG[facility]OC[facility]  [address]  [address] [place], [address]  [phone]  Specialties  Clinical psychologist     Ranjit Youngblood.  [address] ASSOCIATES  [address]  [address] [place], [address]  [phone]  Specialties  Clinical psychologist     [address]  [address] [facility], [address]  [phone]  Specialties  Clinical psychologist     [address] [place] [address], [address]  [phone]  Specialties  Clinical psychologist     [address], [address]  [phone]  Specialties  Clinical psychologist     [address] [place] [address], [address]  [phone]  Specialties  Clinical psychologist             Sincerely,      Jim Allan      [phone]  [facility]

## 2023-08-08 NOTE — TELEPHONE ENCOUNTER
[facility]  [address] [facility] [address]  [address]  [facility], [address]  [phone]  Specialties  Clinical psychologist    Zena CANTU [address] [facility] [place] [address]  [phone]  Specialties  Clinical psychologist    Virginia Aguilera  [address] [facility], [address]  [phone]  Specialties  Clinical psychologist    [address] [facility] [address]  [address]  [address] Hospital ,[address]  [facility], [address]  [phone]  Specialties  Clinical psychologist    [address] [facility] [address] PSYCHOLOGY  [address]  [address] [facility] [address]  [facility], [address]  [phone]  Specialties  Clinical psychologist    Evy Casey [facility]  [address]  27[address] [facility], [address]  [phone]  Specialties  Clinical psychologist    Jg Henry  [address] [facility], [address]  [phone]  Specialties  Clinical psychologist    [address] [facility]  [address] [place], [address]  [phone]  Specialties  Clinical psychologist    [address] [facility]  PSY[facility]  [address]  [address] [place], [address]  [phone]  Specialties  Clinical psychologist    [facility]  PSYCHOLOGY ASSOC[facility]ATES  [address]  [address] [place], [address]  [phone]  Specialties  Clinical psychologist    Ubaldo Galeano.  Zoe, JUSTINE  PSYCHOLOGY ASSOCIATES  [address]  [facility], [address]  [phone]  Specialties  Clinical psychologist    [address]  [address] [facility], [address]  [phone]  Specialties  Clinical psychologist    [address]  [address] [facility], [address]  [phone]  Specialties  Clinical psychologist    [address] [place], [address]  [phone]  Specialties  Clinical psychologist    [address] [place] [address] [address] [place], [address]  [phone]  Specialties  Clinical psychologist

## 2023-08-15 NOTE — TELEPHONE ENCOUNTER
Attempt #3    lvm requesting a call back to provide list of psychologist. This writer sent unable to reach letter to pt via mail.

## 2023-08-29 ENCOUNTER — HOSPITAL ENCOUNTER (OUTPATIENT)
Dept: RADIOLOGY | Facility: HOSPITAL | Age: 68
Discharge: HOME | End: 2023-08-29
Attending: PSYCHIATRY & NEUROLOGY
Payer: MEDICARE

## 2023-08-29 DIAGNOSIS — G20 PARKINSON'S DISEASE (HCC): ICD-10-CM

## 2023-08-29 PROCEDURE — G1004 CDSM NDSC: HCPCS

## 2023-08-29 PROCEDURE — 70551 MRI BRAIN STEM W/O DYE: CPT

## 2023-11-17 ENCOUNTER — TELEPHONE (OUTPATIENT)
Dept: NEUROLOGY | Facility: CLINIC | Age: 68
End: 2023-11-17

## 2023-11-17 NOTE — TELEPHONE ENCOUNTER
Called and spoke with patient. His virtual follow up with Dr. Hellen Colvin is rescheduled to: Tues, 11/28/23 at 8:30am. Patient will be sent link via cellphone at [phone].

## 2023-11-17 NOTE — TELEPHONE ENCOUNTER
11/16 at 10:35 am:    Hi it's Dr. Roberto Cervantes. I had an appointment with Malissa Liang. It was a virtual appointment. Tried to make the connection through, but never got through to you guys. So please give me a call. I also want to confirm the time for Dr. Michel Salguero next appointment with me. But I need to talk to someone on your team about the missed appointment. Phone number is [phone]. And again my name is Viry Weber. YOB: 1955.

## 2023-11-28 ENCOUNTER — TELEPHONE (OUTPATIENT)
Dept: NEUROLOGY | Facility: CLINIC | Age: 68
End: 2023-11-28

## 2023-11-28 ENCOUNTER — TELEMEDICINE (OUTPATIENT)
Dept: NEUROLOGY | Facility: CLINIC | Age: 68
End: 2023-11-28
Payer: MEDICARE

## 2023-11-28 DIAGNOSIS — I95.1 ORTHOSTATIC HYPOTENSION: ICD-10-CM

## 2023-11-28 DIAGNOSIS — G20.A2 PARKINSON'S DISEASE WITHOUT DYSKINESIA, WITH FLUCTUATING MANIFESTATIONS: Primary | ICD-10-CM

## 2023-11-28 PROCEDURE — 99213 OFFICE O/P EST LOW 20 MIN: CPT | Performed by: PSYCHIATRY & NEUROLOGY

## 2023-11-28 RX ORDER — MIRABEGRON 25 MG/1
25 TABLET, FILM COATED, EXTENDED RELEASE ORAL DAILY
COMMUNITY

## 2023-11-28 NOTE — PROGRESS NOTES
Virtual Regular Visit    Verification of patient location:    Patient is located at Other in the following state in which I hold an active license [place]      Assessment/Plan:    Problem List Items Addressed This Visit          Cardiovascular and Mediastinum    Orthostatic hypotension     Orthostatic hypotension has improved off rasagiline and with increase in salt intake, hydration and use of compression stockings. Higher doses of levodopa were associated with return of orthostasis. Nervous and Auditory    Parkinson's disease without dyskinesia, with fluctuating manifestations - Primary     Parkinson disease complicated by motor fluctuations with wearing off medications around 30 minutes prior to each dose. Higher doses of levodopa associated with orthostasis. Would avoid adding medication such as rasagiline, monitoring oxidase inhibitors, amantadine given tendency towards orthostasis. Could consider adding Nourianz or switching carbidopa/levodopa to Rytary. For now we will continue on carbidopa/levodopa 25/100 2 tablets alternating with 1.5 tablets on 4 times daily dosing. Along with entacapone 4 times daily and Sinemet CR nightly. Time once again reviewing neuropsychological testing. He does have mild cognitive impairment would not be an absolute contraindication for DBS. He does understand that DBS does not slow or change any progression in cognition. He is hesitant to proceed with DBS given mild cognitive symptoms at this point. He has restarted psychotherapy for depression. Questions to the guards to carbidopa/levodopa intestinal gel pump were answered. He expressed interest in its use for wearing off. Will refer to gastroenterology for evaluation for candidacy for J tube placement for Duopa pump. Will reach out to  with regards to ways to familiarize Dr. Chris Lisa with device prior to deciding to proceed.            Relevant Orders    Ambulatory Referral to Gastroenterology            Reason for visit is wearing off  Chief Complaint   Patient presents with    Virtual Regular Visit          Encounter provider Gris Alvarenga MD    Provider located at [address]  [facility] [address] [phone]      Recent Visits  Date Type Provider Dept   11/28/23 Telephone Tita Poe MD [facility]   11/28/23 111 [address] MD Lyle [facility]   Showing recent visits within past 7 days and meeting all other requirements  Future Appointments  No visits were found meeting these conditions. Showing future appointments within next 150 days and meeting all other requirements       The patient was identified by name and date of birth. Janey Higgins was informed that this is a telemedicine visit and that the visit is being conducted through Telephone. My office door was closed. No one else was in the room. He acknowledged consent and understanding of privacy and security of the video platform. The patient has agreed to participate and understands they can discontinue the visit at any time. Patient is aware this is a billable service. Technical difficulties encountered when patient attempting to connect to video visit. Phone visit made at patient request     Subjective  Janey Higgins is a 77 y.o. 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 Review of chart reveals, symptom onset in 2017 (62yo) with stooped posture, imbalance and slowed gait. His course is complicated by wearing off. MRI brain showed mild white matter disease. Ceruloplasmin was within normal limits. 24 hour urine copper was elevated at 53 (upper limit of normal is 35). Repeat 24 hour urine was within normal limits. Last seen with orthostatic hypotension.  Tapered off rasagiline with plans to increase levodopa if needed. Hypotension improved with compression socks and increased salt intake. Wearing off 30 minutes prior. He will notice changes in speech followed by tremor and difficulty with gait    Last fall 2 weeks ago. He took the PD dance class. This was ok but he ws fatigued by the end. If sitting for longer than 20 min he has a hard time. Current medications and timing:  Sinemet 25/100 2qam 6am and 2pm, 1.5 tabs at 10am and 6pm  Sinemet CR 50/200mg 1tab qhs   Entacapone 200mg 4 x daily    Prior PD medication:  Azilect 1mg daily (helped wearing off)      Neuropsychological testing- mild cognitive decline likely related to PD although it was noted that vascular or effective contributions may be present. Cognitive difficulties noted to lateralize to the right hemisphere. He was noted to have moderately deficient processing speed, mildly deficient visual memory and visual reasoning. Executive function was in the low average range. Attention and verbal reasoning were average but on the lower end. Also recommended was an audiology eval given hearing deficits.              Past Medical History:   Diagnosis Date    Arthritis     Hypertension     Parkinson's disease     2 years ago    Psoriasis        Past Surgical History:   Procedure Laterality Date    APPENDECTOMY  1966    CHOLECYSTECTOMY      COLONOSCOPY      TESTICLE BIOPSY  1982       Current Outpatient Medications   Medication Sig Dispense Refill    B-D 3CC LUER-MARIA L SYR 25GX1" 25G X 1" 3 ML MISC BY DOES NOT APPLY ROUTE ONCE A WEEK 10 each 3    carbidopa-levodopa (SINEMET CR)  mg per tablet Take 1 tablet by mouth daily at bedtime 90 tablet 3    carbidopa-levodopa (SINEMET)  mg per tablet TAKE 2 TABLETS BY MOUTH 4 TIMES A DAY (Patient taking differently: TAKE 2 TABLETS BY MOUTH 2 TIMES A DAY and 1.5 tabs 2 times a day) 720 tablet 3    cholecalciferol (VITAMIN D3) 1,000 units tablet Take 2,000 Units by mouth daily docusate sodium (COLACE) 100 mg capsule Take 100 mg by mouth if needed      entacapone (COMTAN) 200 mg tablet Take 1 tablet (200 mg total) by mouth 4 (four) times a day Take ALONG with Sinemet 120 tablet 8    Mirabegron ER (Myrbetriq) 25 MG TB24 25 mg in the morning      Multiple Vitamins-Minerals (MULTIVITAMIN ADULT PO) Take 1 tablet by mouth daily      psyllium (METAMUCIL) 58.6 % packet Take 1 packet by mouth if needed      APPLE CIDER VINEGAR PO Take by mouth in the morning (Patient not taking: Reported on 5/1/2023)      rasagiline (AZILECT) 1 MG Take 1 tablet (1 mg total) by mouth daily 5 tablet 0    testosterone cypionate (DEPO-TESTOSTERONE) 200 mg/mL SOLN INJECT 0.5ML IM ONCE WEEKLY. (Patient not taking: Reported on 3/22/2023) 10 mL 4    vardenafil (LEVITRA) 20 MG tablet Take 1 tablet (20 mg total) by mouth daily as needed for erectile dysfunction Take 1 tablet by mouth one (1) hour prior to intercourse. Limit to only 2 tablets per week. (Patient not taking: Reported on 3/22/2023) 3 tablet 2     No current facility-administered medications for this visit. No Known Allergies    Review of Systems   Constitutional: Negative. Negative for appetite change, fatigue and fever. HENT:  Positive for voice change (ongoing for few months). Negative for hearing loss, tinnitus and trouble swallowing. Eyes: Negative. Negative for photophobia, pain and visual disturbance. Respiratory: Negative. Negative for shortness of breath. Cardiovascular: Negative. Negative for palpitations. Gastrointestinal:  Positive for constipation (Has gotten worse). Negative for nausea and vomiting. Endocrine: Negative. Negative for cold intolerance. Genitourinary: Negative. Negative for dysuria, frequency and urgency. Musculoskeletal:  Positive for gait problem (Shuffling feet, stumbles, freezing, Has gotten a little worse). Negative for back pain, myalgias and neck pain. Occasional falls     Skin: Negative. Negative for rash. Allergic/Immunologic: Negative. Neurological:  Positive for dizziness, tremors (Occasional in hands) and weakness (Arms and Legs). Negative for seizures, syncope, facial asymmetry, speech difficulty, light-headedness, numbness and headaches. Hematological: Negative. Does not bruise/bleed easily. Psychiatric/Behavioral:  Positive for sleep disturbance (Wakes up around 2:00 AM). Negative for confusion and hallucinations. All other systems reviewed and are negative.          Visit Time  Total Visit Duration: 18 minute phone call

## 2023-11-28 NOTE — ASSESSMENT & PLAN NOTE
Parkinson disease complicated by motor fluctuations with wearing off medications around 30 minutes prior to each dose. Higher doses of levodopa associated with orthostasis. Would avoid adding medication such as rasagiline, monitoring oxidase inhibitors, amantadine given tendency towards orthostasis. Could consider adding Nourianz or switching carbidopa/levodopa to Rytary. For now we will continue on carbidopa/levodopa 25/100 2 tablets alternating with 1.5 tablets on 4 times daily dosing. Along with entacapone 4 times daily and Sinemet CR nightly. Time once again reviewing neuropsychological testing. He does have mild cognitive impairment would not be an absolute contraindication for DBS. He does understand that DBS does not slow or change any progression in cognition. He is hesitant to proceed with DBS given mild cognitive symptoms at this point. He has restarted psychotherapy for depression. Questions to the guards to carbidopa/levodopa intestinal gel pump were answered. He expressed interest in its use for wearing off. Will refer to gastroenterology for evaluation for candidacy for J tube placement for Duopa pump. Will reach out to [facility] with regards to ways to familiarize Dr. Mary Persaud with device prior to deciding to proceed.

## 2023-11-28 NOTE — ASSESSMENT & PLAN NOTE
Orthostatic hypotension has improved off rasagiline and with increase in salt intake, hydration and use of compression stockings. Higher doses of levodopa were associated with return of orthostasis.

## 2024-01-01 ENCOUNTER — HOME CARE VISIT (OUTPATIENT)
Dept: HOME HOSPICE | Facility: HOSPICE | Age: 69
End: 2024-01-01
Payer: MEDICARE

## 2024-01-01 ENCOUNTER — HOME CARE VISIT (OUTPATIENT)
Dept: HOME HEALTH SERVICES | Facility: HOME HEALTHCARE | Age: 69
End: 2024-01-01
Payer: MEDICARE

## 2024-01-01 ENCOUNTER — TELEPHONE (OUTPATIENT)
Age: 69
End: 2024-01-01

## 2024-01-01 VITALS
HEART RATE: 100 BPM | SYSTOLIC BLOOD PRESSURE: 120 MMHG | TEMPERATURE: 98 F | DIASTOLIC BLOOD PRESSURE: 60 MMHG | RESPIRATION RATE: 28 BRPM

## 2024-01-01 VITALS — DIASTOLIC BLOOD PRESSURE: 50 MMHG | RESPIRATION RATE: 30 BRPM | SYSTOLIC BLOOD PRESSURE: 88 MMHG

## 2024-01-01 DIAGNOSIS — F02.80 ALZHEIMER'S DEMENTIA, UNSPECIFIED DEMENTIA SEVERITY, UNSPECIFIED TIMING OF DEMENTIA ONSET, UNSPECIFIED WHETHER BEHAVIORAL, PSYCHOTIC, OR MOOD DISTURBANCE OR ANXIETY (HCC): ICD-10-CM

## 2024-01-01 DIAGNOSIS — Z51.5 HOSPICE CARE PATIENT: Primary | ICD-10-CM

## 2024-01-01 DIAGNOSIS — G20.A2 PARKINSON'S DISEASE WITHOUT DYSKINESIA, WITH FLUCTUATING MANIFESTATIONS (HCC): ICD-10-CM

## 2024-01-01 DIAGNOSIS — G30.9 ALZHEIMER'S DEMENTIA, UNSPECIFIED DEMENTIA SEVERITY, UNSPECIFIED TIMING OF DEMENTIA ONSET, UNSPECIFIED WHETHER BEHAVIORAL, PSYCHOTIC, OR MOOD DISTURBANCE OR ANXIETY (HCC): ICD-10-CM

## 2024-01-01 PROCEDURE — G0299 HHS/HOSPICE OF RN EA 15 MIN: HCPCS

## 2024-01-01 PROCEDURE — G0156 HHCP-SVS OF AIDE,EA 15 MIN: HCPCS

## 2024-01-01 PROCEDURE — G0155 HHCP-SVS OF CSW,EA 15 MIN: HCPCS

## 2024-01-01 RX ORDER — LORAZEPAM 2 MG/ML
CONCENTRATE ORAL
Qty: 30 ML | Refills: 0 | Status: SHIPPED | OUTPATIENT
Start: 2024-01-01 | End: 2024-10-11

## 2024-01-01 RX ORDER — MORPHINE SULFATE 100 MG/5ML
SOLUTION, CONCENTRATE ORAL
Qty: 30 ML | Refills: 0 | Status: SHIPPED | OUTPATIENT
Start: 2024-01-01 | End: 2024-10-11

## 2024-01-01 RX ORDER — LORAZEPAM 2 MG/ML
CONCENTRATE ORAL
Qty: 30 ML | Refills: 0 | Status: SHIPPED | OUTPATIENT
Start: 2024-01-01 | End: 2024-01-01 | Stop reason: SDUPTHER

## 2024-01-01 RX ADMIN — Medication 1 DOSE: at 11:40

## 2024-01-29 ENCOUNTER — TELEPHONE (OUTPATIENT)
Dept: NEUROLOGY | Facility: CLINIC | Age: 69
End: 2024-01-29

## 2024-01-29 NOTE — TELEPHONE ENCOUNTER
Provider Hieu Muir called today stating this pt was admitted after Covid and went to inpatient Rehab immediately afterwards. He will be faxing over discharge summary. Medication changes were made. If you want to reach to discuss anything please call him directly at [phone].

## 2024-02-16 NOTE — ASSESSMENT & PLAN NOTE
"ANTEPARTUM PROGRESS NOTE   2024, 6:14 AM     SUBJECTIVE:  No acute events overnight. Patient comfortably resting in bed and has no complaints this morning. Denies contractions, VB, LOF. Reports normal fetal movement. Denies HA, vision changes, CP, SOB, RUQ or epigastric pain.     OBJECTIVE:   /79 (BP Location: Right arm, Patient Position: Lying)   Pulse 77   Temp 36.7 °C (98.1 °F) (Temporal)   Resp 18   Ht 1.702 m (5' 7\")   Wt 78.2 kg (172 lb 6.4 oz)   LMP 2023   SpO2 97%   BMI 27.00 kg/m²    Temp  Min: 36.7 °C (98.1 °F)  Max: 37.1 °C (98.8 °F)  Pulse  Min: 62  Max: 83  BP  Min: 105/67  Max: 147/95    General:  AAOx3, No acute distress  Cardiovascular: Warm and well perfused  Respiratory: Normal respiratory effort. Lungs clear on auscultation  Abdominal:  Soft, gravid, non-tender, no rebound or guarding, no palpable contractions   Motor: moving all extremeties  Neuro: grossly intact      NST: Baseline 125/ moderate variability variable accels/ variable decels with single prolonged to 115, with 40 minutes of subsequent reassuring FHT   [place]: quiet     Labs:   Lab Results   Component Value Date    ABO O 02/15/2024    LABRH POS 02/15/2024    ABSCRN NEG 02/15/2024     Lab Results   Component Value Date    GLUCOSE 82 02/15/2024     02/15/2024    K 3.6 02/15/2024     02/15/2024    CO2 23 02/15/2024    ANIONGAP 14 02/15/2024    BUN 18 02/15/2024    CREATININE 0.63 02/15/2024    EGFR >90 02/15/2024    CALCIUM 8.7 02/15/2024    ALBUMIN 3.2 (L) 02/15/2024    PROT 5.8 (L) 02/15/2024    ALKPHOS 52 02/15/2024    ALT 37 02/15/2024    AST 27 02/15/2024    BILITOT 0.3 02/15/2024     Albumin and Total Protein are stable from previous  labs.    ASSESSMENT AND PLAN:     31 y.o.  at 26w5d by IVF dating admitted with preE with SF and FGR.     sPEC  - Diagnosed by severe range BP requiring IV tx, has also had severe ranges >4 hrs apart  - S/p IV lab  at [facility] ; last IV tx here  pm IV " PD with wearing off and the development of  orthostatic hypotension after we increase levodopa. Also with increasing constipation. We discussed options including:  - Lowering/ tapering off rasagiline in an attempt to reduce orthostatic hypotension and to then see if higher doses of levodopa would be tolerated. - Switching Sinemet to Rytary. - Adding a medication such as fludrocortisone or midodrine to improve blood pressure so that levodopa could be increased. He also continues to express interest in deep insulation surgery. Time spent discussing deep brains stimulation surgery, it use in PD to treat levodopa responsive symptoms such as tremor, bradykinesia, rigidity and wearing off, the procedure and potential risks and benefits. Goals of surgery should he wish to proceed at this point would be reduce off time. We will try to reduce medication but he understands this may be limited by it need for nonmotor symptoms. It would not be expected to help with primary balance issues. Less effect if any seen with midline symptoms. Mild cognitive impairment noted on neuropsychological testing which can increase risks of surgery. DBS does not slow progression or have an effect on cognition. Decline in cognition can be seen after surgery. Will obtain updated MRI brain given changes noted. After discussion we opted to reduce rasagiline 1mg to 1/2 tab x 1 week and the discontinue. If orthostatic hypotension is less frequent we can then try increasing carbidopa/levodopa back to 2 tabs 4 times daily. Will refer for psychotherapy for depression. He wishes to have someone with experience in movement disorders. lab 20  - HELLP labs negative over multiple sets, P:C 1.43  - For twice weekly HELLP labs (Mon/Thurs) while inpatient if BP & symptoms stable  - s/p 12hr Mag gtt on admission  - Current BP Regimen: nifed 60 BID + Lab 600 BID   - Continued recommendation for inpatient monitoring and recommendation for delivery at 34.0 wga at the latest, however would deliver before 34wks for indications including but not limited to rapid uptitration of antihypertensives and concern for fetal wellbeing in the setting of now known FGR.     FGR, abnormal dopplers  - EFW (1/29): 526g (4%ile), normal Dopplers  - Daily BPP and dopplers  - Genetics consulted: Discussed options for possible amniocentesis, cord blood karyotyping  vs fetal karyotyping with CMA, genetic panel or RONEL  - one loop of rEDF on 2/5, dopplers since iAEDF, dopplers daily, pending this AM       Fetal status:   - continue BID NSTs while inpatient, daily dopplers    - S/p BMZ 1/26-1/27 and 2/5-2/6   - S/p NICU consult on 1/29    Routine:  - GBS negative 1/27  - TDAP received 2/9  - Flu received 10/23  - 1hr : 82 on 2/16  - BCM: declines    Dispo: Stable, daily BPP/dopplers and BID NST.   Pt seen and discussed with M Attending, Dr. Galindo.     Note prepared by Pooja Ruvalcaba, MS-3      Principal Problem:    Severe pre-eclampsia in second trimester  Active Problems:    Poor fetal growth affecting management of mother in second trimester    Pt seen and dw Medical student and edits made in text, agree with above, dw attending Dr Josie Domingo MD  OB/GYN PGY3

## 2024-02-21 ENCOUNTER — HOME HEALTH ADMISSION (OUTPATIENT)
Dept: HOME HEALTH SERVICES | Facility: HOME HEALTHCARE | Age: 69
End: 2024-02-21
Payer: MEDICARE

## 2024-02-21 ENCOUNTER — TELEMEDICINE (OUTPATIENT)
Dept: NEUROLOGY | Facility: CLINIC | Age: 69
End: 2024-02-21
Payer: MEDICARE

## 2024-02-21 DIAGNOSIS — G20.A2 PARKINSON'S DISEASE WITHOUT DYSKINESIA, WITH FLUCTUATING MANIFESTATIONS: Primary | ICD-10-CM

## 2024-02-21 DIAGNOSIS — I95.1 ORTHOSTATIC HYPOTENSION: ICD-10-CM

## 2024-02-21 PROBLEM — Z12.11 COLON CANCER SCREENING: Status: RESOLVED | Noted: 2019-04-30 | Resolved: 2024-02-21

## 2024-02-21 PROCEDURE — 99441 PR PHYS/QHP TELEPHONE EVALUATION 5-10 MIN: CPT | Performed by: PSYCHIATRY & NEUROLOGY

## 2024-02-21 RX ORDER — FLUDROCORTISONE ACETATE 0.1 MG/1
0.1 TABLET ORAL DAILY
COMMUNITY

## 2024-02-21 RX ORDER — MIDODRINE HYDROCHLORIDE 5 MG/1
5 TABLET ORAL 2 TIMES DAILY
COMMUNITY
Start: 2024-01-29

## 2024-02-21 RX ORDER — POLYETHYLENE GLYCOL 3350 17 G/17G
17 POWDER, FOR SOLUTION ORAL AS NEEDED
COMMUNITY

## 2024-02-21 RX ORDER — LANOLIN ALCOHOL/MO/W.PET/CERES
3 CREAM (GRAM) TOPICAL DAILY PRN
COMMUNITY
Start: 2024-01-29 | End: 2025-01-28

## 2024-02-21 NOTE — PROGRESS NOTES
Virtual Regular Visit    Verification of patient location:    Patient is located at {[facility] Virtual Patient Location:73755} in the following state in which I hold an active license {[facility] virtual patient location:[address]93}      Assessment/Plan:    Problem List Items Addressed This Visit    None           Reason for visit is   Chief Complaint   Patient presents with    Virtual Regular Visit          Encounter provider Meredith Alvarenga MD    Provider located at [place] [address]  [address]  [facility] [address] [phone]      Recent Visits  No visits were found meeting these conditions.  Showing recent visits within past 7 days and meeting all other requirements  Today's Visits  Date Type Provider Dept   02/21/24 Telemedicine Meredith Alvarenga MD [facility]   Showing today's visits and meeting all other requirements  Future Appointments  No visits were found meeting these conditions.  Showing future appointments within next 150 days and meeting all other requirements       The patient was identified by name and date of birth. Lit Tripp was informed that this is a telemedicine visit and that the visit is being conducted through {[facility] VIRTUAL VISIT MEDIUM:09539}.  {Telemedicine confidentiality :43737} {Telemedicine participants:62425}  He acknowledged consent and understanding of privacy and security of the video platform. The patient has agreed to participate and understands they can discontinue the visit at any time.    Patient is aware this is a billable service.     Subjective  Lit Tripp is a 68 y.o. male *** .      HPI     Past Medical History:   Diagnosis Date    Arthritis     Hypertension     Parkinson's disease     2 years ago    Psoriasis        Past Surgical History:   Procedure Laterality Date    APPENDECTOMY  1966    CHOLECYSTECTOMY      COLONOSCOPY      TESTICLE BIOPSY  1982       Current Outpatient Medications  "  Medication Sig Dispense Refill    B-D 3CC LUER-MARIA L SYR 25GX1\" 25G X 1\" 3 ML MISC BY DOES NOT APPLY ROUTE ONCE A WEEK 10 each 3    carbidopa-levodopa (SINEMET)  mg per tablet TAKE 2 TABLETS BY MOUTH 4 TIMES A DAY (Patient taking differently: TAKE 1.5 TABLETS BY MOUTH 4 TIMES A DAY and .5 tabs at bedtime) 720 tablet 3    cholecalciferol (VITAMIN D3) 1,000 units tablet Take 2,000 Units by mouth daily      docusate sodium (COLACE) 100 mg capsule Take 100 mg by mouth if needed      entacapone (COMTAN) 200 mg tablet Take 1 tablet (200 mg total) by mouth 4 (four) times a day Take ALONG with Sinemet 120 tablet 8    fludrocortisone (FLORINEF) 0.1 mg tablet Take 0.1 mg by mouth daily      melatonin 3 mg Take 3 mg by mouth daily as needed      Meloxicam 15 MG TBDP Take by mouth if needed      midodrine (PROAMATINE) 5 mg tablet Take 5 mg by mouth 2 (two) times a day      Multiple Vitamins-Minerals (MULTIVITAMIN ADULT PO) Take 1 tablet by mouth daily      polyethylene glycol (MiraLax) 17 GM/SCOOP powder Take 17 g by mouth if needed      psyllium (METAMUCIL) 58.6 % packet Take 1 packet by mouth if needed      APPLE CIDER VINEGAR PO Take by mouth in the morning (Patient not taking: Reported on 5/1/2023)      carbidopa-levodopa (SINEMET CR)  mg per tablet Take 1 tablet by mouth daily at bedtime (Patient not taking: Reported on 2/21/2024) 90 tablet 3    Mirabegron ER (Myrbetriq) 25 MG TB24 25 mg in the morning (Patient not taking: Reported on 2/21/2024)      rasagiline (AZILECT) 1 MG Take 1 tablet (1 mg total) by mouth daily 5 tablet 0    testosterone cypionate (DEPO-TESTOSTERONE) 200 mg/mL SOLN INJECT 0.5ML IM ONCE WEEKLY. (Patient not taking: Reported on 3/22/2023) 10 mL 4    vardenafil (LEVITRA) 20 MG tablet Take 1 tablet (20 mg total) by mouth daily as needed for erectile dysfunction Take 1 tablet by mouth one (1) hour prior to intercourse. Limit to only 2 tablets per week. (Patient not taking: Reported on " 3/22/2023) 3 tablet 2     No current facility-administered medications for this visit.        No Known Allergies    Review of Systems   Constitutional:  Negative for appetite change, fatigue and fever.   HENT:  Positive for voice change (when dose is ending). Negative for hearing loss, tinnitus and trouble swallowing.    Eyes: Negative.  Negative for photophobia, pain and visual disturbance.   Respiratory: Negative.  Negative for shortness of breath.    Cardiovascular: Negative.  Negative for palpitations.   Gastrointestinal:  Negative for nausea and vomiting.        Urinary Incontinence   Endocrine: Negative.  Negative for cold intolerance.   Genitourinary: Negative.  Negative for dysuria, frequency and urgency.   Musculoskeletal:  Positive for gait problem (Shuufling feet , stumbles, freezing, has gotten better) and myalgias (Right Knee). Negative for back pain, neck pain and neck stiffness.   Skin: Negative.  Negative for rash.   Allergic/Immunologic: Negative.    Neurological:  Positive for dizziness, tremors (Arms, has stayed the same) and weakness (Occasionally in arms and Legs). Negative for seizures, syncope, facial asymmetry, speech difficulty, light-headedness, numbness and headaches.   Hematological: Negative.  Does not bruise/bleed easily.   Psychiatric/Behavioral: Negative.  Negative for confusion, hallucinations and sleep disturbance.         Vivid Dreams     All other systems reviewed and are negative.      Video Exam    There were no vitals filed for this visit.    Physical Exam     Visit Time  Total Visit Duration: ***

## 2024-02-21 NOTE — ASSESSMENT & PLAN NOTE
Parkinson's disease with recent decline following hospitalization for worsening autonomic instability likely triggger by infection with volume depletion. Now in rehab for decondition and making gains. Stillnot at baseline but will be discharged this week.     Will continue on current medications  Sinemet 25/100 1.5tab 4 times daily (6am, 10am, 2pm, 6pm) an ½ tab at bedtime  Entacapone 200mg 4 x daily  Midodrine 5mg bid  Fludrocortisone 0.1mg daily     Will reevaluate in office I a few weeks

## 2024-02-21 NOTE — PROGRESS NOTES
Virtual Brief Visit    This Visit is being completed by telephone. The Patient is located at Other and in the following state in which I hold an active license [place]    The patient was identified by name and date of birth. Lit Tripp was informed that this is a telemedicine visit and that the visit is being conducted through Telephone.  My office door was closed. No one else was in the room.  He acknowledged consent and understanding of privacy and security of the video platform. The patient has agreed to participate and understands they can discontinue the visit at any time.    Patient is aware this is a billable service.       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  He was unable to connect to video also brief telephone visit conducted.  Review of chart reveals, symptom onset in 2017 (62yo) with stooped posture, imbalance and slowed gait. His course is complicated by wearing off and orthostatic hypotension which improved after tapering off rasagiline and use of compression socks and increased salt intake.       Patient had COVID 1-2.  He was hospitalized 1/10 for dizziness and fall.  Remarkable for hyponatremia and lactic acidosis.  He was volume depleted.  Was having autonomic dysfunction with orthostasis.  Transferred to rehab where he is called from today.  He reports he is now on midodrine twice daily along with fludrocortisone 0.1 mg daily.  He uses an abdominal binder on occasion when blood pressure is low.  Has not needed in the past few days.  He is wearing compression stockings.  Orthostatic hypotension is now better controlled.  There are some periods of hypotension therefore he is wary about continuing on medications for weight.  Is also reduction in levodopa which he is tolerating.  He does have some increased shuffling with this reduction.  Slowly improving.    Assessment/Plan:    Problem List Items  Addressed This Visit       Parkinson's disease without dyskinesia, with fluctuating manifestations - Primary     Parkinson's disease with recent decline following hospitalization for worsening autonomic instability likely triggger by infection with volume depletion. Now in rehab for decondition and making gains. Stillnot at baseline but will be discharged this week.     Will continue on current medications  Sinemet 25/100 1.5tab 4 times daily (6am, 10am, 2pm, 6pm) an ½ tab at bedtime  Entacapone 200mg 4 x daily  Midodrine 5mg bid  Fludrocortisone 0.1mg daily     Will reevaluate in office I a few weeks          Orthostatic hypotension     Continue fludrocortisone and midodrine until visit              Recent Visits  No visits were found meeting these conditions.  Showing recent visits within past 7 days and meeting all other requirements  Today's Visits  Date Type Provider Dept   02/21/24 Telemedicine Meredith Alvarenga MD [facility]   Showing today's visits and meeting all other requirements  Future Appointments  No visits were found meeting these conditions.  Showing future appointments within next 150 days and meeting all other requirements         Review of Systems   Constitutional:  Negative for appetite change, fatigue and fever.   HENT:  Positive for voice change (when dose is ending). Negative for hearing loss, tinnitus and trouble swallowing.    Eyes: Negative.  Negative for photophobia, pain and visual disturbance.   Respiratory: Negative.  Negative for shortness of breath.    Cardiovascular: Negative.  Negative for palpitations.   Gastrointestinal:  Negative for nausea and vomiting.        Urinary Incontinence   Endocrine: Negative.  Negative for cold intolerance.   Genitourinary: Negative.  Negative for dysuria, frequency and urgency.   Musculoskeletal:  Positive for gait problem (Shuufling feet , stumbles, freezing, has gotten better) and myalgias (Right Knee). Negative for back pain,  neck pain and neck stiffness.   Skin: Negative.  Negative for rash.   Allergic/Immunologic: Negative.    Neurological:  Positive for dizziness, tremors (Arms, has stayed the same) and weakness (Occasionally in arms and Legs). Negative for seizures, syncope, facial asymmetry, speech difficulty, light-headedness, numbness and headaches.   Hematological: Negative.  Does not bruise/bleed easily.   Psychiatric/Behavioral: Negative.  Negative for confusion, hallucinations and sleep disturbance.         Vivid Dreams     All other systems reviewed and are negative.     Visit Time  Total Visit Duration: 10 minutes

## 2024-02-25 ENCOUNTER — HOME CARE VISIT (OUTPATIENT)
Dept: HOME HEALTH SERVICES | Facility: HOME HEALTHCARE | Age: 69
End: 2024-02-25
Payer: MEDICARE

## 2024-02-25 VITALS
DIASTOLIC BLOOD PRESSURE: 90 MMHG | OXYGEN SATURATION: 99 % | TEMPERATURE: 98.4 F | HEART RATE: 80 BPM | RESPIRATION RATE: 20 BRPM | SYSTOLIC BLOOD PRESSURE: 150 MMHG

## 2024-02-25 PROCEDURE — G0299 HHS/HOSPICE OF RN EA 15 MIN: HCPCS

## 2024-02-25 PROCEDURE — 10330081 VN NO-PAY CLAIM PROCEDURE

## 2024-02-25 PROCEDURE — 400013 VN SOC

## 2024-02-26 ENCOUNTER — HOME CARE VISIT (OUTPATIENT)
Dept: HOME HEALTH SERVICES | Facility: HOME HEALTHCARE | Age: 69
End: 2024-02-26
Payer: MEDICARE

## 2024-02-27 ENCOUNTER — HOME CARE VISIT (OUTPATIENT)
Dept: HOME HEALTH SERVICES | Facility: HOME HEALTHCARE | Age: 69
End: 2024-02-27
Payer: MEDICARE

## 2024-02-27 PROCEDURE — G0155 HHCP-SVS OF CSW,EA 15 MIN: HCPCS

## 2024-02-27 PROCEDURE — G0152 HHCP-SERV OF OT,EA 15 MIN: HCPCS

## 2024-02-28 ENCOUNTER — HOME CARE VISIT (OUTPATIENT)
Dept: HOME HEALTH SERVICES | Facility: HOME HEALTHCARE | Age: 69
End: 2024-02-28
Payer: MEDICARE

## 2024-02-28 VITALS — HEART RATE: 78 BPM | DIASTOLIC BLOOD PRESSURE: 90 MMHG | SYSTOLIC BLOOD PRESSURE: 154 MMHG | OXYGEN SATURATION: 98 %

## 2024-02-28 VITALS — SYSTOLIC BLOOD PRESSURE: 98 MMHG | HEART RATE: 80 BPM | DIASTOLIC BLOOD PRESSURE: 58 MMHG | OXYGEN SATURATION: 98 %

## 2024-02-28 PROCEDURE — G0151 HHCP-SERV OF PT,EA 15 MIN: HCPCS

## 2024-02-29 ENCOUNTER — HOME CARE VISIT (OUTPATIENT)
Dept: HOME HEALTH SERVICES | Facility: HOME HEALTHCARE | Age: 69
End: 2024-02-29
Payer: MEDICARE

## 2024-03-01 ENCOUNTER — HOME CARE VISIT (OUTPATIENT)
Dept: HOME HEALTH SERVICES | Facility: HOME HEALTHCARE | Age: 69
End: 2024-03-01
Payer: MEDICARE

## 2024-03-01 VITALS — DIASTOLIC BLOOD PRESSURE: 70 MMHG | SYSTOLIC BLOOD PRESSURE: 155 MMHG

## 2024-03-01 VITALS — SYSTOLIC BLOOD PRESSURE: 80 MMHG | OXYGEN SATURATION: 100 % | HEART RATE: 79 BPM | DIASTOLIC BLOOD PRESSURE: 52 MMHG

## 2024-03-01 PROCEDURE — G0299 HHS/HOSPICE OF RN EA 15 MIN: HCPCS

## 2024-03-01 PROCEDURE — G0156 HHCP-SVS OF AIDE,EA 15 MIN: HCPCS

## 2024-03-01 PROCEDURE — G0158 HHC OT ASSISTANT EA 15: HCPCS

## 2024-03-02 DIAGNOSIS — G20.A1 PARKINSON'S DISEASE: ICD-10-CM

## 2024-03-04 ENCOUNTER — HOME CARE VISIT (OUTPATIENT)
Dept: HOME HEALTH SERVICES | Facility: HOME HEALTHCARE | Age: 69
End: 2024-03-04
Payer: MEDICARE

## 2024-03-04 VITALS — SYSTOLIC BLOOD PRESSURE: 90 MMHG | DIASTOLIC BLOOD PRESSURE: 52 MMHG

## 2024-03-04 PROCEDURE — G0158 HHC OT ASSISTANT EA 15: HCPCS

## 2024-03-04 PROCEDURE — G0156 HHCP-SVS OF AIDE,EA 15 MIN: HCPCS

## 2024-03-05 ENCOUNTER — HOME CARE VISIT (OUTPATIENT)
Dept: HOME HEALTH SERVICES | Facility: HOME HEALTHCARE | Age: 69
End: 2024-03-05
Payer: MEDICARE

## 2024-03-05 VITALS — DIASTOLIC BLOOD PRESSURE: 78 MMHG | SYSTOLIC BLOOD PRESSURE: 142 MMHG | OXYGEN SATURATION: 100 % | HEART RATE: 66 BPM

## 2024-03-05 PROCEDURE — G0153 HHCP-SVS OF S/L PATH,EA 15MN: HCPCS

## 2024-03-05 PROCEDURE — G0151 HHCP-SERV OF PT,EA 15 MIN: HCPCS

## 2024-03-05 RX ORDER — CARBIDOPA AND LEVODOPA 50; 200 MG/1; MG/1
1 TABLET, EXTENDED RELEASE ORAL
Qty: 90 TABLET | Refills: 3 | Status: SHIPPED | OUTPATIENT
Start: 2024-03-05

## 2024-03-06 ENCOUNTER — HOME CARE VISIT (OUTPATIENT)
Dept: HOME HEALTH SERVICES | Facility: HOME HEALTHCARE | Age: 69
End: 2024-03-06
Payer: MEDICARE

## 2024-03-06 VITALS — OXYGEN SATURATION: 98 % | SYSTOLIC BLOOD PRESSURE: 147 MMHG | HEART RATE: 69 BPM | DIASTOLIC BLOOD PRESSURE: 93 MMHG

## 2024-03-06 PROCEDURE — G0158 HHC OT ASSISTANT EA 15: HCPCS

## 2024-03-07 ENCOUNTER — HOME CARE VISIT (OUTPATIENT)
Dept: HOME HEALTH SERVICES | Facility: HOME HEALTHCARE | Age: 69
End: 2024-03-07
Payer: MEDICARE

## 2024-03-07 PROCEDURE — G0151 HHCP-SERV OF PT,EA 15 MIN: HCPCS

## 2024-03-07 PROCEDURE — G0156 HHCP-SVS OF AIDE,EA 15 MIN: HCPCS

## 2024-03-07 NOTE — CASE COMMUNICATION
this message is informative only.  No action required.    Impression. Moderate dysarthria characterized  by limited articulatory postures and  low volume w speech intell w careful listening   Cognitive linguistic deficits.  Mild.moderate immediate and STM.  DNT LTM, problem solving, organization due to Pt. fatigue.   Oriented x3.   Language WFL in conversational speech.  Pt repoertedly is tolerating a regular diet w thin liquids.   Mild  [place].    Vision.  WFL w trifocal glasses.      Rec.  St tx 2x wk x 4 wks.  cont informal eval  practice assignments given

## 2024-03-08 ENCOUNTER — HOME CARE VISIT (OUTPATIENT)
Dept: HOME HEALTH SERVICES | Facility: HOME HEALTHCARE | Age: 69
End: 2024-03-08
Payer: MEDICARE

## 2024-03-08 VITALS — SYSTOLIC BLOOD PRESSURE: 100 MMHG | HEART RATE: 78 BPM | DIASTOLIC BLOOD PRESSURE: 60 MMHG | OXYGEN SATURATION: 98 %

## 2024-03-08 PROCEDURE — G0153 HHCP-SVS OF S/L PATH,EA 15MN: HCPCS

## 2024-03-09 ENCOUNTER — NURSE TRIAGE (OUTPATIENT)
Dept: OTHER | Facility: OTHER | Age: 69
End: 2024-03-09

## 2024-03-09 ENCOUNTER — DOCUMENTATION (OUTPATIENT)
Dept: SLEEP CENTER | Facility: CLINIC | Age: 69
End: 2024-03-09

## 2024-03-09 DIAGNOSIS — G20.A1 PARKINSON'S DISEASE: ICD-10-CM

## 2024-03-09 NOTE — TELEPHONE ENCOUNTER
"Answer Assessment - Initial Assessment Questions  1. DRUG NAME: \"What medicine do you need to have refilled?\"      Carbidopa-Levodopa 25-100mg     2. REFILLS REMAINING: \"How many refills are remaining?\" (Note: The label on the medicine or pill bottle will show how many refills are remaining. If there are no refills remaining, then a renewal may be needed.)      0    3. EXPIRATION DATE: \"What is the expiration date?\" (Note: The label states when the prescription will , and thus can no longer be refilled.)      N/a    4. PRESCRIBING HCP: \"Who prescribed it?\" Reason: If prescribed by specialist, call should be referred to that group.      Neurology    Protocols used: Medication Refill and Renewal Call-ADULT-    "

## 2024-03-09 NOTE — TELEPHONE ENCOUNTER
On call provider contacted, gave a verbal order to send in a 7 day supply of the  Carbidopa Levodopa 25-100mg medication with the dosage instructions per patient's last visit on 2/21. Prescription sent to the patient's designated pharmacy. Instructed patient to follow up with the office on Monday to get the full refill. Patient verbalized understanding.

## 2024-03-09 NOTE — TELEPHONE ENCOUNTER
Regarding: Carbidopa-Levodopa  refill/sent to [facility] on [address]  ----- Message from Bell Goldberg sent at 3/9/2024 12:43 PM EST -----  Medication Refill Request     Name Carbidopa-Levodopa   Dose/Frequency 25-100mg/TAKE 2 TABLETS BY MOUTH 4 TIMES A DAY  Quantity 720  Verified pharmacy   [x ]  Verified ordering Provider   [ x]  Does patient have enough for the next 3 days? Yes [ ] No [ x]

## 2024-03-11 ENCOUNTER — HOME CARE VISIT (OUTPATIENT)
Dept: HOME HEALTH SERVICES | Facility: HOME HEALTHCARE | Age: 69
End: 2024-03-11
Payer: MEDICARE

## 2024-03-11 VITALS — OXYGEN SATURATION: 99 % | SYSTOLIC BLOOD PRESSURE: 104 MMHG | DIASTOLIC BLOOD PRESSURE: 73 MMHG | HEART RATE: 80 BPM

## 2024-03-11 PROCEDURE — G0158 HHC OT ASSISTANT EA 15: HCPCS

## 2024-03-11 PROCEDURE — G0156 HHCP-SVS OF AIDE,EA 15 MIN: HCPCS

## 2024-03-11 NOTE — TELEPHONE ENCOUNTER
Pt called into [facility] requesting refills of carbidopa-levodopa on 3/9/24. A seven day supply was sent. Last telemed visit was 2/21/24. Routed to provider to review the refill request.

## 2024-03-11 NOTE — TELEPHONE ENCOUNTER
See separate refill request for the sinemet. Pt needs an OV scheduled. Per telemed visit on 2/21/24:      Instructions      Return in about 6 weeks (around 4/3/2024) for with mariaelena  .    Routed to clerical team to assist with scheduling the OV.

## 2024-03-12 ENCOUNTER — HOME CARE VISIT (OUTPATIENT)
Dept: HOME HEALTH SERVICES | Facility: HOME HEALTHCARE | Age: 69
End: 2024-03-12
Payer: MEDICARE

## 2024-03-12 PROCEDURE — G0153 HHCP-SVS OF S/L PATH,EA 15MN: HCPCS

## 2024-03-12 PROCEDURE — G0151 HHCP-SERV OF PT,EA 15 MIN: HCPCS

## 2024-03-12 NOTE — TELEPHONE ENCOUNTER
Patient is currently scheduled on 6/ at 930. am w/Almita. Patient requested [facility]. I added patient to wait list.

## 2024-03-12 NOTE — TELEPHONE ENCOUNTER
VM 3/11 at 10:19 am:    Lit Tripp, date of birth, [birth year]. This is regarding the carbidopa-levodopa (the rest of the message was inaudible).   _______________________________________________    Called pt back. He needs a 30 day supply of carbidopa-levodopa immediate release sent to [facility]. The script that was sent on 3/9 was for 7 days only. Routed to provider to authorize the refill request.

## 2024-03-13 ENCOUNTER — HOME CARE VISIT (OUTPATIENT)
Dept: HOME HEALTH SERVICES | Facility: HOME HEALTHCARE | Age: 69
End: 2024-03-13
Payer: MEDICARE

## 2024-03-13 VITALS — SYSTOLIC BLOOD PRESSURE: 119 MMHG | DIASTOLIC BLOOD PRESSURE: 75 MMHG | HEART RATE: 76 BPM

## 2024-03-13 VITALS — HEART RATE: 68 BPM | DIASTOLIC BLOOD PRESSURE: 70 MMHG | OXYGEN SATURATION: 100 % | SYSTOLIC BLOOD PRESSURE: 140 MMHG

## 2024-03-13 PROCEDURE — G0158 HHC OT ASSISTANT EA 15: HCPCS

## 2024-03-14 ENCOUNTER — HOME CARE VISIT (OUTPATIENT)
Dept: HOME HEALTH SERVICES | Facility: HOME HEALTHCARE | Age: 69
End: 2024-03-14
Payer: MEDICARE

## 2024-03-14 VITALS — OXYGEN SATURATION: 99 % | HEART RATE: 72 BPM | SYSTOLIC BLOOD PRESSURE: 138 MMHG | DIASTOLIC BLOOD PRESSURE: 80 MMHG

## 2024-03-14 PROCEDURE — G0151 HHCP-SERV OF PT,EA 15 MIN: HCPCS

## 2024-03-14 PROCEDURE — G0156 HHCP-SVS OF AIDE,EA 15 MIN: HCPCS

## 2024-03-15 ENCOUNTER — HOME CARE VISIT (OUTPATIENT)
Dept: HOME HEALTH SERVICES | Facility: HOME HEALTHCARE | Age: 69
End: 2024-03-15
Payer: MEDICARE

## 2024-03-15 PROCEDURE — G0153 HHCP-SVS OF S/L PATH,EA 15MN: HCPCS

## 2024-03-18 ENCOUNTER — HOME CARE VISIT (OUTPATIENT)
Dept: HOME HEALTH SERVICES | Facility: HOME HEALTHCARE | Age: 69
End: 2024-03-18
Payer: MEDICARE

## 2024-03-18 ENCOUNTER — TELEPHONE (OUTPATIENT)
Dept: NEUROLOGY | Facility: CLINIC | Age: 69
End: 2024-03-18

## 2024-03-18 VITALS — DIASTOLIC BLOOD PRESSURE: 83 MMHG | OXYGEN SATURATION: 98 % | SYSTOLIC BLOOD PRESSURE: 131 MMHG | HEART RATE: 80 BPM

## 2024-03-18 PROCEDURE — G0152 HHCP-SERV OF OT,EA 15 MIN: HCPCS

## 2024-03-18 PROCEDURE — G0156 HHCP-SVS OF AIDE,EA 15 MIN: HCPCS

## 2024-03-18 NOTE — TELEPHONE ENCOUNTER
Patient called checking on both upcoming appointments:    6/4/24 @ 9:30 am with Almita Walton    10/21/24 @ 11:30 am with Dr. Chowdary

## 2024-03-20 ENCOUNTER — HOME CARE VISIT (OUTPATIENT)
Dept: HOME HEALTH SERVICES | Facility: HOME HEALTHCARE | Age: 69
End: 2024-03-20
Payer: MEDICARE

## 2024-03-20 VITALS — HEART RATE: 86 BPM | SYSTOLIC BLOOD PRESSURE: 108 MMHG | OXYGEN SATURATION: 100 % | DIASTOLIC BLOOD PRESSURE: 64 MMHG

## 2024-03-20 PROCEDURE — G0151 HHCP-SERV OF PT,EA 15 MIN: HCPCS

## 2024-03-20 PROCEDURE — G0153 HHCP-SVS OF S/L PATH,EA 15MN: HCPCS

## 2024-03-21 ENCOUNTER — HOME CARE VISIT (OUTPATIENT)
Dept: HOME HEALTH SERVICES | Facility: HOME HEALTHCARE | Age: 69
End: 2024-03-21
Payer: MEDICARE

## 2024-03-21 PROCEDURE — G0153 HHCP-SVS OF S/L PATH,EA 15MN: HCPCS

## 2024-03-21 PROCEDURE — G0156 HHCP-SVS OF AIDE,EA 15 MIN: HCPCS

## 2024-03-21 NOTE — CASE COMMUNICATION
PT DC and agency DC  planned for Thursday March 28th with Nominic signed for this date.  Patient is in agreement for agency DC.    PT alerted team of plan for DC and all disciplines have coordinated plans for DC next week.     In addition,  spouse has alerted PT today that patient has been found ambulating without walker in the kitchen and other locations in home. Please emphasize to patient to always use roller walker for fall preventi on.      HHA schedulers   Please make Monday March 25th the last HHA visit and take out Thursday's visit as PT is dcing that day.   Patient is in agreement with Monday being last HHA day.

## 2024-03-22 ENCOUNTER — HOME CARE VISIT (OUTPATIENT)
Dept: HOME HEALTH SERVICES | Facility: HOME HEALTHCARE | Age: 69
End: 2024-03-22

## 2024-03-22 ENCOUNTER — HOME CARE VISIT (OUTPATIENT)
Dept: HOME HEALTH SERVICES | Facility: HOME HEALTHCARE | Age: 69
End: 2024-03-22
Payer: MEDICARE

## 2024-03-22 VITALS — HEART RATE: 77 BPM | DIASTOLIC BLOOD PRESSURE: 87 MMHG | SYSTOLIC BLOOD PRESSURE: 121 MMHG | OXYGEN SATURATION: 98 %

## 2024-03-22 PROCEDURE — G0158 HHC OT ASSISTANT EA 15: HCPCS

## 2024-03-22 NOTE — CASE COMMUNICATION
Per PCP office [facility] has been attempting to contact pt for delivery of WC AE. Therapist had spouse contact [employer] during session. Therapist spoke to representative at  and she states that they do not carry WC seat  or cushion to prevent R lateral lean. Therapist to consult c OTR for a recommendation for WC positioning.  to deliver new seat cushion and brake extenders on 3/25.

## 2024-03-25 ENCOUNTER — HOME CARE VISIT (OUTPATIENT)
Dept: HOME HEALTH SERVICES | Facility: HOME HEALTHCARE | Age: 69
End: 2024-03-25
Payer: MEDICARE

## 2024-03-25 PROCEDURE — G0156 HHCP-SVS OF AIDE,EA 15 MIN: HCPCS

## 2024-03-26 ENCOUNTER — HOME CARE VISIT (OUTPATIENT)
Dept: HOME HEALTH SERVICES | Facility: HOME HEALTHCARE | Age: 69
End: 2024-03-26
Payer: MEDICARE

## 2024-03-26 PROCEDURE — G0152 HHCP-SERV OF OT,EA 15 MIN: HCPCS

## 2024-03-26 PROCEDURE — G0153 HHCP-SVS OF S/L PATH,EA 15MN: HCPCS

## 2024-03-27 ENCOUNTER — HOME CARE VISIT (OUTPATIENT)
Dept: HOME HEALTH SERVICES | Facility: HOME HEALTHCARE | Age: 69
End: 2024-03-27
Payer: MEDICARE

## 2024-03-27 PROCEDURE — G0153 HHCP-SVS OF S/L PATH,EA 15MN: HCPCS

## 2024-03-28 ENCOUNTER — HOME CARE VISIT (OUTPATIENT)
Dept: HOME HEALTH SERVICES | Facility: HOME HEALTHCARE | Age: 69
End: 2024-03-28
Payer: MEDICARE

## 2024-03-28 VITALS — SYSTOLIC BLOOD PRESSURE: 110 MMHG | OXYGEN SATURATION: 97 % | DIASTOLIC BLOOD PRESSURE: 70 MMHG | HEART RATE: 68 BPM

## 2024-03-28 PROCEDURE — G0151 HHCP-SERV OF PT,EA 15 MIN: HCPCS

## 2024-03-28 PROCEDURE — G0156 HHCP-SVS OF AIDE,EA 15 MIN: HCPCS

## 2024-03-29 NOTE — CASE COMMUNICATION
"fyi.  dc from [facility] 3.27.24     Impression     Mild dysarthria w inconsistent periods of limited articulatory postures and low volume  which is an increase from  Moderate dysarthria characterized by limited articulatory postures and low volume w speech intell w careful listening   Cognitive linguistic deficits. progress made but continues to be at  Mild.moderate in immediate.  STM and organization.  Oriented x3.    DNT LTM, problem solv ing,  due to Pt. fatigue  Pt repoertedly is tolerating a regular diet w thin liquids. Mild [place].   Vision. WFL w trifocal glasses.     Rec.  DC [facility] due to no longer homebound  Cont Out Pt ST  Read signs posted to \"Remember your walker\"   practice assignments given   refer if change in status     "

## 2024-04-09 ENCOUNTER — HOME HEALTH ADMISSION (OUTPATIENT)
Dept: HOME HEALTH SERVICES | Facility: HOME HEALTHCARE | Age: 69
End: 2024-04-09
Payer: MEDICARE

## 2024-04-09 DIAGNOSIS — G20.A1 PARKINSON'S DISEASE: ICD-10-CM

## 2024-04-19 ENCOUNTER — HOME CARE VISIT (OUTPATIENT)
Dept: HOME HEALTH SERVICES | Facility: HOME HEALTHCARE | Age: 69
End: 2024-04-19

## 2024-04-19 NOTE — Clinical Note
Hi Dr. Church, Seeking [facility] approval for pt at [facility]. DC home today after completing STR. Lit Tripp 68 yo  2.. DX. Parkinson's Disease with orthostatic hypotension  incomplete bladder emptying dysphagia Covid 2024 and acute DVT. Adm [facility] 3/29 after he fell onto his buttocks after becoming dizzy in the shower. In ED BP 60's.Transitioned to [facility] 4.3.24. PPS 40. AAOx4 dysarthria and dysphagia reported. Needs asst with feeding.  Uses walker and gait belt. Poor safety awareness balance issues and hx falls. Needs asst with all ADL's. Incont of urine. He wants to go home with hospice services. Labs: hb 10. Appears appropriate for RLOC

## 2024-04-21 ENCOUNTER — HOME CARE VISIT (OUTPATIENT)
Dept: HOME HEALTH SERVICES | Facility: HOME HEALTHCARE | Age: 69
End: 2024-04-21
Payer: MEDICARE

## 2024-04-21 VITALS
TEMPERATURE: 98.1 F | RESPIRATION RATE: 18 BRPM | DIASTOLIC BLOOD PRESSURE: 98 MMHG | OXYGEN SATURATION: 98 % | SYSTOLIC BLOOD PRESSURE: 180 MMHG | HEART RATE: 82 BPM

## 2024-04-21 PROCEDURE — G0299 HHS/HOSPICE OF RN EA 15 MIN: HCPCS

## 2024-04-21 PROCEDURE — 10330081 VN NO-PAY CLAIM PROCEDURE

## 2024-04-21 PROCEDURE — 400013 VN SOC

## 2024-04-22 ENCOUNTER — HOME CARE VISIT (OUTPATIENT)
Dept: HOME HEALTH SERVICES | Facility: HOME HEALTHCARE | Age: 69
End: 2024-04-22
Payer: MEDICARE

## 2024-04-23 ENCOUNTER — HOME CARE VISIT (OUTPATIENT)
Dept: HOME HEALTH SERVICES | Facility: HOME HEALTHCARE | Age: 69
End: 2024-04-23
Payer: MEDICARE

## 2024-04-23 VITALS — HEART RATE: 74 BPM | OXYGEN SATURATION: 98 % | DIASTOLIC BLOOD PRESSURE: 90 MMHG | SYSTOLIC BLOOD PRESSURE: 160 MMHG

## 2024-04-23 PROCEDURE — G0152 HHCP-SERV OF OT,EA 15 MIN: HCPCS

## 2024-04-23 PROCEDURE — G0151 HHCP-SERV OF PT,EA 15 MIN: HCPCS

## 2024-04-25 ENCOUNTER — HOME CARE VISIT (OUTPATIENT)
Dept: HOME HEALTH SERVICES | Facility: HOME HEALTHCARE | Age: 69
End: 2024-04-25
Payer: MEDICARE

## 2024-04-25 VITALS — OXYGEN SATURATION: 99 % | DIASTOLIC BLOOD PRESSURE: 60 MMHG | HEART RATE: 84 BPM | SYSTOLIC BLOOD PRESSURE: 100 MMHG

## 2024-04-25 VITALS
DIASTOLIC BLOOD PRESSURE: 60 MMHG | SYSTOLIC BLOOD PRESSURE: 120 MMHG | RESPIRATION RATE: 18 BRPM | TEMPERATURE: 98.5 F | OXYGEN SATURATION: 99 % | HEART RATE: 86 BPM

## 2024-04-25 PROCEDURE — G0300 HHS/HOSPICE OF LPN EA 15 MIN: HCPCS

## 2024-04-25 PROCEDURE — G0152 HHCP-SERV OF OT,EA 15 MIN: HCPCS

## 2024-04-26 ENCOUNTER — HOME CARE VISIT (OUTPATIENT)
Dept: HOME HEALTH SERVICES | Facility: HOME HEALTHCARE | Age: 69
End: 2024-04-26
Payer: MEDICARE

## 2024-04-26 ENCOUNTER — TELEPHONE (OUTPATIENT)
Dept: HOME HEALTH SERVICES | Facility: HOME HEALTHCARE | Age: 69
End: 2024-04-26

## 2024-04-26 VITALS — DIASTOLIC BLOOD PRESSURE: 80 MMHG | OXYGEN SATURATION: 99 % | SYSTOLIC BLOOD PRESSURE: 120 MMHG | HEART RATE: 75 BPM

## 2024-04-26 PROCEDURE — G0151 HHCP-SERV OF PT,EA 15 MIN: HCPCS

## 2024-04-29 ENCOUNTER — HOME CARE VISIT (OUTPATIENT)
Dept: HOME HEALTH SERVICES | Facility: HOME HEALTHCARE | Age: 69
End: 2024-04-29
Payer: MEDICARE

## 2024-04-29 VITALS
HEART RATE: 82 BPM | DIASTOLIC BLOOD PRESSURE: 80 MMHG | OXYGEN SATURATION: 96 % | RESPIRATION RATE: 18 BRPM | TEMPERATURE: 98.1 F | SYSTOLIC BLOOD PRESSURE: 122 MMHG

## 2024-04-29 VITALS — SYSTOLIC BLOOD PRESSURE: 90 MMHG | OXYGEN SATURATION: 98 % | DIASTOLIC BLOOD PRESSURE: 60 MMHG | HEART RATE: 84 BPM

## 2024-04-29 PROCEDURE — G0152 HHCP-SERV OF OT,EA 15 MIN: HCPCS

## 2024-04-29 PROCEDURE — G0300 HHS/HOSPICE OF LPN EA 15 MIN: HCPCS

## 2024-04-29 PROCEDURE — G0151 HHCP-SERV OF PT,EA 15 MIN: HCPCS

## 2024-04-29 NOTE — Clinical Note
Wound on coccyx has healed. I did not see anything there after cleaning it. I resolved it on care plan.

## 2024-04-29 NOTE — CASE COMMUNICATION
Gayathri Escobedo (:  1970) is a 53 y.o. female,Established patient, here for evaluation of the following chief complaint(s):  Weight Loss (Med Refill)         ASSESSMENT/PLAN:  1. Class 3 severe obesity due to excess calories with serious comorbidity and body mass index (BMI) of 45.0 to 49.9 in adult ([facility])  -     Tirzepatide (MOUNJARO) 5 MG/0.5ML SOPN SC injection; Inject 0.5 mLs into the skin once a week, Disp-4 Adjustable Dose Pre-filled Pen Syringe, R-2Normal  The current medical regimen is effective;  continue present plan and medications.      No follow-ups on file.         Subjective   SUBJECTIVE/OBJECTIVE:  Obesity check up.  She was supposed to have colonoscopy in  and then it was rescheduled for this month.  She had to stop injection twice for 2 weeks.  Did much better on saxenda but it is not available.         Review of Systems   Constitutional:  Negative for activity change, appetite change, fatigue and unexpected weight change.   Respiratory:  Positive for cough (stopping smoking). Negative for shortness of breath and wheezing.    Cardiovascular:  Negative for chest pain and palpitations.   Gastrointestinal:  Negative for constipation, diarrhea, nausea and vomiting.   Neurological:  Negative for weakness, light-headedness and headaches.   Psychiatric/Behavioral:  Positive for sleep disturbance. Negative for dysphoric mood. The patient is not nervous/anxious.           Objective   /78   Pulse 98   Temp 97.7 °F (36.5 °C)   Resp 16   Ht 1.594 m (5' 2.76\")   Wt 124.9 kg (275 lb 6.4 oz)   LMP  (LMP Unknown)   SpO2 100%   BMI 49.17 kg/m²    Physical Exam  Constitutional:       General: She is not in acute distress.     Appearance: Normal appearance. She is well-developed. She is obese.   HENT:      Head: Normocephalic and atraumatic.   Neck:      Thyroid: No thyromegaly.      Trachea: No tracheal deviation.   Cardiovascular:      Rate and Rhythm: Normal rate and regular rhythm.       Patient called PT Saturday and is asking about referral to palliative care. Patient has declined hospice care but wants to be seen by palliative care at this time.   Nurse seeing patien today, PT sent TT to her to ask if she would discuss this with him today.

## 2024-04-29 NOTE — CASE COMMUNICATION
Provided patient with Palliative care phone number at time of visit. Patient was asking PT and OT for that information. Patient stated that he will follow up with palliative care later this afternoon.

## 2024-04-30 ENCOUNTER — HOME CARE VISIT (OUTPATIENT)
Dept: HOME HEALTH SERVICES | Facility: HOME HEALTHCARE | Age: 69
End: 2024-04-30
Payer: MEDICARE

## 2024-04-30 VITALS — HEART RATE: 91 BPM | OXYGEN SATURATION: 96 % | DIASTOLIC BLOOD PRESSURE: 78 MMHG | SYSTOLIC BLOOD PRESSURE: 116 MMHG

## 2024-04-30 PROCEDURE — G0153 HHCP-SVS OF S/L PATH,EA 15MN: HCPCS

## 2024-05-01 ENCOUNTER — HOME CARE VISIT (OUTPATIENT)
Dept: HOME HEALTH SERVICES | Facility: HOME HEALTHCARE | Age: 69
End: 2024-05-01
Payer: MEDICARE

## 2024-05-01 VITALS — OXYGEN SATURATION: 100 % | HEART RATE: 74 BPM | DIASTOLIC BLOOD PRESSURE: 80 MMHG | SYSTOLIC BLOOD PRESSURE: 150 MMHG

## 2024-05-01 PROCEDURE — G0151 HHCP-SERV OF PT,EA 15 MIN: HCPCS

## 2024-05-02 ENCOUNTER — HOME CARE VISIT (OUTPATIENT)
Dept: HOME HEALTH SERVICES | Facility: HOME HEALTHCARE | Age: 69
End: 2024-05-02
Payer: MEDICARE

## 2024-05-02 ENCOUNTER — HOME CARE VISIT (OUTPATIENT)
Dept: HOME HEALTH SERVICES | Facility: HOME HEALTHCARE | Age: 69
End: 2024-05-02

## 2024-05-02 VITALS
DIASTOLIC BLOOD PRESSURE: 70 MMHG | HEART RATE: 88 BPM | TEMPERATURE: 98 F | SYSTOLIC BLOOD PRESSURE: 132 MMHG | OXYGEN SATURATION: 98 % | RESPIRATION RATE: 16 BRPM

## 2024-05-02 PROCEDURE — G0152 HHCP-SERV OF OT,EA 15 MIN: HCPCS

## 2024-05-02 PROCEDURE — G0299 HHS/HOSPICE OF RN EA 15 MIN: HCPCS

## 2024-05-02 RX ORDER — SULFAMETHOXAZOLE AND TRIMETHOPRIM 800; 160 MG/1; MG/1
1 TABLET ORAL 2 TIMES DAILY
COMMUNITY

## 2024-05-02 RX ORDER — MELOXICAM 15 MG/1
15 TABLET ORAL DAILY
COMMUNITY
Start: 2024-04-19

## 2024-05-02 RX ORDER — IVERMECTIN 3 MG/1
TABLET ORAL
COMMUNITY

## 2024-05-02 RX ORDER — CHOLECALCIFEROL (VITAMIN D3) 50 MCG
2000 TABLET ORAL DAILY
COMMUNITY
Start: 2024-04-25

## 2024-05-02 RX ORDER — SOD.CHLORID/POTASSIUM CHLORIDE 287-180-15
TABLET ORAL
COMMUNITY
Start: 2024-04-22

## 2024-05-02 RX ORDER — FERROUS SULFATE 325(65) MG
1 TABLET ORAL DAILY
COMMUNITY
Start: 2024-04-24

## 2024-05-02 RX ORDER — MIDODRINE HYDROCHLORIDE 10 MG/1
TABLET ORAL
COMMUNITY
Start: 2024-04-19

## 2024-05-02 RX ORDER — LANOLIN ALCOHOL/MO/W.PET/CERES
1000 CREAM (GRAM) TOPICAL DAILY
COMMUNITY
Start: 2024-04-25

## 2024-05-02 NOTE — CASE COMMUNICATION
dc to therapy today no other skilled nursing needs bottom still pink patient to apply barrier cream 2x daily to maintain due to incontinence.    called pcp office to request referral for hospice liason to come talk to patient and family per patient he wants to be managed at home no more hospital interventions.   TY

## 2024-05-02 NOTE — Clinical Note
Hi Dr. Church and Dr. Gonsales, Seeking re approval for pt for RLOC. He was previously approved by Dr. Church on 24 for Parkinson's but opted to go home with [facility]. Dr. Lit Tripp 70 yo  2.26.. DX. Parkinson's Disease with orthostatic hypotension incomplete bladder emptying dysphagia Covid 2024 and acute DVT. DC to home from [facility] 4.19 with [facility]. HH reports he is now ready for hospice. PPS 40. Seen in  ED 4.30 for a fall, pt on eliquis.  AAOx4 dysarthria and dysphagia reported. Needs asst with feeding.  Uses wc. Poor safety awareness balance issues and hx falls. Needs asst with all ADL's. Incont of urine. Appears appropriate for RLOC.

## 2024-05-03 ENCOUNTER — HOME CARE VISIT (OUTPATIENT)
Dept: HOME HEALTH SERVICES | Facility: HOME HEALTHCARE | Age: 69
End: 2024-05-03
Payer: MEDICARE

## 2024-05-03 VITALS — SYSTOLIC BLOOD PRESSURE: 125 MMHG | HEART RATE: 83 BPM | DIASTOLIC BLOOD PRESSURE: 78 MMHG

## 2024-05-03 PROCEDURE — G0153 HHCP-SVS OF S/L PATH,EA 15MN: HCPCS

## 2024-05-03 RX ORDER — SODIUM CHLORIDE 1 G/1
TABLET ORAL
COMMUNITY
Start: 2024-04-19 | End: 2024-06-18

## 2024-05-03 NOTE — CASE COMMUNICATION
This message is informative only.  No action required.      Impression.   Cognitive linquistic deficits in the following areas:   Mild.moderate in immediate memory and  organization,  Mild in STM, and problem solving..  Severe in math problems.  Oriented x 3.   Observed moderate dysarthria in conversational speech characterized  with low volume and limited articulatory postures particularly on phonemes t.d.n and l.    Low volume due to  limited breath supply. Hearing. Mild [place]     Uncertain of level of visual acuity. He reportedly can read newspaper headlines but not print.      Rec.   Sp tx. 1x1 and 2x1    Cont informal eval  practice assignments given  Pt reportedly is scheduling apt w Ophthalmologist. in near future.     .

## 2024-05-06 ENCOUNTER — CLINICAL SUPPORT (OUTPATIENT)
Dept: PALLIATIVE MEDICINE | Facility: CLINIC | Age: 69
End: 2024-05-06
Payer: MEDICARE

## 2024-05-06 ENCOUNTER — OFFICE VISIT (OUTPATIENT)
Dept: PALLIATIVE MEDICINE | Facility: CLINIC | Age: 69
End: 2024-05-06
Payer: MEDICARE

## 2024-05-06 VITALS
BODY MASS INDEX: 35.4 KG/M2 | WEIGHT: 232.81 LBS | DIASTOLIC BLOOD PRESSURE: 80 MMHG | HEART RATE: 90 BPM | OXYGEN SATURATION: 95 % | TEMPERATURE: 98.2 F | SYSTOLIC BLOOD PRESSURE: 140 MMHG

## 2024-05-06 DIAGNOSIS — M19.90 ARTHRITIS: ICD-10-CM

## 2024-05-06 DIAGNOSIS — G20.A2 PARKINSON'S DISEASE WITHOUT DYSKINESIA, WITH FLUCTUATING MANIFESTATIONS: Primary | ICD-10-CM

## 2024-05-06 DIAGNOSIS — Z71.89 GOALS OF CARE, COUNSELING/DISCUSSION: ICD-10-CM

## 2024-05-06 DIAGNOSIS — Z71.89 COUNSELING AND COORDINATION OF CARE: Primary | ICD-10-CM

## 2024-05-06 DIAGNOSIS — F43.20 ADJUSTMENT DISORDER, UNSPECIFIED TYPE: ICD-10-CM

## 2024-05-06 DIAGNOSIS — Z51.5 PALLIATIVE CARE BY SPECIALIST: ICD-10-CM

## 2024-05-06 PROCEDURE — NC001 PR NO CHARGE: Performed by: COUNSELOR

## 2024-05-06 PROCEDURE — 99205 OFFICE O/P NEW HI 60 MIN: CPT | Performed by: INTERNAL MEDICINE

## 2024-05-06 NOTE — PROGRESS NOTES
Palliative Outpatient Assessment Note    LSW appreciates the opportunity to provide the patient and his son, LINA, with outpatient emotional support and guidance while they continue to receive medical attention from a Palliative provider.    LSW completed an assessment of need which was completed with the patient and his son, LINA.    Relationship status: .     Duration of relationship:     Name of significant other: Nikki.    Children and Ages: The patient has 5 children - (LINA, Rhiannon, Catie, Darnell, and Vincent).    Pets:    Other important family information: The patient has 5 grandchildren.     Living situation (place and with whom): The patient lives in a two story home with his wife and grandson, jennifer. The patient and his wife have everything set up on the first floor.     Patient's primary caregiver:  Self, wife, and Jennifer.     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     Disability:    Concerns regarding literacy: None.     Spirituality/ Tenriism:  Bahai.     Cultural information:     Mental Health and/or Drug and Alcohol history: The patient does report some Anxiety and Depression.     Advanced Directives: The patient stated that he does have this completed.  It was encouraged that the patient bring it in to his next scheduled appointment to be scanned in.     Patient's strengths, social supports, and resources: The patient states that he has a good support system consisting of family.    Patient/family current level of coping: The patient is taking things day by day, but expressed some difficulty navigating the system.    Level of understanding: The patient has a good understanding of his diagnosis and next steps.     Patient/family concerns and areas of need:    The patient's main concern at today's encounter is obtaining extra help  within the home.  The patient stated that his grandson is living with them temporarily, and that he helps with AM and PM routines, but that ideally he would appreciate some extra help in order to maintain a shower 2x a week, in addition to taking some responsibility off of his family.  The patient expressed that he currently has [facility], but that his insurance will only cover a certain duration of time covered.  The patient reports that they are wonderful.  The patient stated that both him and his wife have a hospital bed on the first floor, and that he does his stretches daily.  The patient has a wheelchair, a rollator, PT elastic bands, a shower chair, grab bars, and a raised toilet seat. The patient is currently not driving and was inquiring on eTutor to curb assistance. LSW advised that the following resources can be prepared for him and sent via mail which includes: Parkinson's Support Group information and resources.  LSW provided the patient today with Home Health Aide resources.  LSW informed the patient that contact can be made with the [employer] to see if they would qualify for Waiver Services.  The patient stated that he brings in around 5,000/month. The patient also relayed that his Parkinson's Medications are very expensive.  LSW provided supportive listening.  The patient reflected on his enjoyment towards cooking, and being involved with the [employer].  The patient is a [employer] sports fan and also likes [employer] as of recent.     I have spent 60 minutes with Patient and family today in which greater than 50% of this time was spent in counseling/coordination of care regarding  Ongoing emotional support .    *All questions may not be answered due to constraints.  Follow-up discussions may need to occur

## 2024-05-06 NOTE — ASSESSMENT & PLAN NOTE
Not yet interested in hospice, however fairly familiar with the hospice program.  Will reach out should he have further questions or require a re-consult to hospice.   Has ACP docs, but not on file.  Will ask these to be brought in for next apppt.

## 2024-05-06 NOTE — ASSESSMENT & PLAN NOTE
Follow up in 2-3 months, however may certainly reach out to our team with any questions or concerns.   PSC SW to provide with additional resources

## 2024-05-06 NOTE — PROGRESS NOTES
Outpatient Follow-Up - Palliative and Supportive Care   Lit Tripp 69 y.o. male [id number]  1. Parkinson's disease without dyskinesia, with fluctuating manifestations  Assessment & Plan:  Will reach out to neurology regarding some of patient's ongoing questions and concerns      2. Palliative care by specialist  Assessment & Plan:  Follow up in 2-3 months, however may certainly reach out to our team with any questions or concerns.   [facility] SW to provide with additional resources      3. Goals of care, counseling/discussion  Assessment & Plan:  Not yet interested in hospice, however fairly familiar with the hospice program.  Will reach out should he have further questions or require a re-consult to hospice.   Has ACP docs, but not on file.  Will ask these to be brought in for next apppt.       4. Adjustment disorder, unspecified type  Assessment & Plan:  Continue lexapro daily      5. Arthritis      There are no discontinued medications.    Narrative rationale for today's treatments:    Introduced palliative medicine.  PSC SW greatly appreciated in coordinating resources for patient.  Will remain available for care needs as applicable.       Lit Tripp was seen today for symptoms and planning cares related to above illnesses.  I have reviewed the patient's controlled substance dispensing history in the Prescription Drug Monitoring Program in compliance with the [place] regulations before prescribing any controlled substances.    They are invited to continue to follow with us.  If there are questions or concerns, please contact us through our clinic/answering service 24 hours a day, seven days a week.    Benedicto Zhang MD  [facility] Palliative and Supportive Care  [phone]      Visit Information      Narrative and Interval History      Lit Tripp is a 69 y.o. male who presents in follow up of symptoms related to Parkinson's disease.  Pertinent issues include: symptom management, assessment of goals of care,  advance care planning      Dr. 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  He is mostly in his wheelchair, with < 50% of his mobility using a walker.  Has a resting tremor which can make simple tasks challenging.  Has occasional aches and pains, for which tylenol is effective (from a 5 to a 2).  Some skin irritation on his backside being taken care of by [facility] and a fungal rash on his L back for which he is using lotrisone powder.  No issues with nausea, vomiting.  Appetite is robust and he finds eating takeout as one of his current joys.  Has LE edema and wears compressions socks and a compression brace on his abdomen due to edema and orthostasis.  Urinary frequency which awakens him at times.  Constipation which he self-treats with OTC meds.     Socially, he is supported by his children, and lives with a grandson and his spouse.  He is accompanied today by his son LINA who drives him places.  Has most of his DME in place, though has some specific needs.  Reports he has ACP documents, but these are not currently on file in EPIC.  Reports that his spouse is ill, and that they both have hospital beds on the first floor.     Past medical, surgical, social, and family histories are reviewed and pertinent updates are made.    Review of Systems   Constitutional: Positive for malaise/fatigue and weight gain. Negative for decreased appetite.   Cardiovascular:  Positive for dyspnea on exertion. Negative for chest pain.   Respiratory:  Positive for shortness of breath.    Skin:  Positive for color change and rash.   Musculoskeletal:  Positive for back pain, joint pain and muscle weakness.   Gastrointestinal:  Positive for constipation. Negative for bloating, abdominal pain, nausea and vomiting.   Neurological:  Positive for light-headedness and weakness. Negative for excessive daytime sleepiness.   Psychiatric/Behavioral:  Negative for altered mental status and depression. The patient does not have insomnia and  is not nervous/anxious.          Physical Exam and Objective Data  Vital Signs - /80 (BP Location: Right arm, Patient Position: Sitting, Cuff Size: Standard)   Pulse 90   Temp 98.2 °F (36.8 °C) (Temporal)   Wt 106 kg (232 lb 12.9 oz)   SpO2 95%   BMI 35.40 kg/m²   Physical Exam  Vitals and nursing note reviewed.   Constitutional:       General: He is not in acute distress.     Appearance: He is ill-appearing. He is not toxic-appearing or diaphoretic.   HENT:      Head: Normocephalic and atraumatic.      Comments: Masked facies     Right Ear: External ear normal.      Left Ear: External ear normal.      Nose: Nose normal.      Mouth/Throat:      Mouth: Mucous membranes are moist.   Eyes:      General:         Right eye: No discharge.         Left eye: No discharge.   Cardiovascular:      Rate and Rhythm: Normal rate.   Pulmonary:      Effort: No respiratory distress.   Abdominal:      General: There is no distension.   Musculoskeletal:         General: Swelling and deformity present.      Right lower leg: Edema present.      Left lower leg: Edema present.   Skin:     General: Skin is warm and dry.      Coloration: Skin is not jaundiced or pale.   Neurological:      Mental Status: He is alert and oriented to person, place, and time.      Comments: Hypophonia, moderate/mild resting tremor R> L   Psychiatric:         Mood and Affect: Mood normal.         Behavior: Behavior normal.           Radiology and Laboratory:  I personally reviewed and interpreted the following results: recent MRI, imaging from [facility]    I have spent a total time of 65 minutes on 05/06/24 in caring for this patient including Risks and benefits of tx options, Instructions for management, Patient and family education, Importance of tx compliance, Counseling / Coordination of care, Documenting in the medical record, Reviewing / ordering tests, medicine, procedures  , Obtaining or reviewing history  , and Communicating with other healthcare  professionals .

## 2024-05-07 ENCOUNTER — TELEPHONE (OUTPATIENT)
Dept: NEUROLOGY | Facility: CLINIC | Age: 69
End: 2024-05-07

## 2024-05-07 ENCOUNTER — HOME CARE VISIT (OUTPATIENT)
Dept: HOME HEALTH SERVICES | Facility: HOME HEALTHCARE | Age: 69
End: 2024-05-07
Payer: MEDICARE

## 2024-05-07 PROCEDURE — G0153 HHCP-SVS OF S/L PATH,EA 15MN: HCPCS

## 2024-05-07 NOTE — TELEPHONE ENCOUNTER
----- Message from Meredith Alvarenga MD sent at 5/7/2024 10:42 AM EDT -----  Please reach out to the patient for more information/ list of questions that need to be addressed for my review. Thanks    ----- Message -----  From: Benedicto Zhang MD  Sent: 5/6/2024  12:59 PM EDT  To: Meredith Alvarenga MD    Good morning -             I met Dr. Tripp and his son today in the office.  He has decided not to enroll in hospice cares yet, but will remain established with me in Palliative Care for the ongoing support.  He reported he has several questions for you regarding his medications.  Since your next appt isn't until October, do you mind you or someone from your office following up with him? Thanks!    Benedicto

## 2024-05-07 NOTE — TELEPHONE ENCOUNTER
Spoke with pt. He had a few concerns for the provider:    He is concerned about his orthostatic hypotension. He presently takes midodrine 5 mg BID and Florinef. He still has to be very careful with positional changes ,as he feels dizzy. Has been hydrating by drinking water and takes salt tablets. Also wears thigh high compression stockings and an abdominal binder. He still occasionally feels symptomatic.  Pt is questioning if knee high compression stockings would be as effective as the thigh high. If so, he requested to ask provider if she would order them for him.  Pt has an OV with Almita Walton scheduled for 6/4/24. He requested to be placed on a cancellation list in case something opens up sooner.    Routed to provider to advise. Routed to MA to place pt on the cancellation list.

## 2024-05-08 ENCOUNTER — HOME CARE VISIT (OUTPATIENT)
Dept: HOME HEALTH SERVICES | Facility: HOME HEALTHCARE | Age: 69
End: 2024-05-08
Payer: MEDICARE

## 2024-05-08 VITALS — SYSTOLIC BLOOD PRESSURE: 165 MMHG | OXYGEN SATURATION: 96 % | HEART RATE: 65 BPM | DIASTOLIC BLOOD PRESSURE: 92 MMHG

## 2024-05-08 VITALS — HEART RATE: 72 BPM | DIASTOLIC BLOOD PRESSURE: 60 MMHG | OXYGEN SATURATION: 95 % | SYSTOLIC BLOOD PRESSURE: 100 MMHG

## 2024-05-08 PROCEDURE — G0151 HHCP-SERV OF PT,EA 15 MIN: HCPCS

## 2024-05-08 PROCEDURE — G0152 HHCP-SERV OF OT,EA 15 MIN: HCPCS

## 2024-05-08 NOTE — TELEPHONE ENCOUNTER
Meredith Alvarenga MD  Neurology [place] Clinical Team 45 hours ago (9:08 AM)       Thigh high would work better for orthostatic hypotension.  Are orthostatic symptoms present all day? Or worse certain times of day. What times is he taking midodrine so we can see if we can adjust dosing to improve symptoms. An alternative would be switching to droxidopa.

## 2024-05-09 ENCOUNTER — HOME CARE VISIT (OUTPATIENT)
Dept: HOME HEALTH SERVICES | Facility: HOME HEALTHCARE | Age: 69
End: 2024-05-09
Payer: MEDICARE

## 2024-05-09 PROCEDURE — G0153 HHCP-SVS OF S/L PATH,EA 15MN: HCPCS

## 2024-05-10 ENCOUNTER — HOME CARE VISIT (OUTPATIENT)
Dept: HOME HEALTH SERVICES | Facility: HOME HEALTHCARE | Age: 69
End: 2024-05-10
Payer: MEDICARE

## 2024-05-10 VITALS — SYSTOLIC BLOOD PRESSURE: 150 MMHG | OXYGEN SATURATION: 98 % | DIASTOLIC BLOOD PRESSURE: 90 MMHG | HEART RATE: 82 BPM

## 2024-05-10 VITALS — OXYGEN SATURATION: 98 % | HEART RATE: 61 BPM | DIASTOLIC BLOOD PRESSURE: 88 MMHG | SYSTOLIC BLOOD PRESSURE: 138 MMHG

## 2024-05-10 PROCEDURE — G0151 HHCP-SERV OF PT,EA 15 MIN: HCPCS

## 2024-05-10 PROCEDURE — G0152 HHCP-SERV OF OT,EA 15 MIN: HCPCS

## 2024-05-10 NOTE — TELEPHONE ENCOUNTER
Meredith Alvarenga MD  Neurology [place] Clinical Team 45 hours ago (9:08 AM)         Thigh high would work better for orthostatic hypotension.  Are orthostatic symptoms present all day? Or worse certain times of day. What times is he taking midodrine so we can see if we can adjust dosing to improve symptoms. An alternative would be switching to droxidopa.      ---------------------------------    I spoke to Lit and read Provider's note above. He informed he doesn't typically get symptoms like dizziness. But he notices if he overexerts himself in the am, BP may dip to 80/50, it typically is around  140/90 when its good. He is wearing knew high teds, but will use the Thigh high teds, as his grandson helps him put those on and off daily. He informed he takes Midodrine with Breakfast, Lunch and Dinner. He informed he has not had a fall since end of April, so he did not think he needs to switch his medications right now, as he is staying hydrated and taking his salt tablets (as here are times his BP dips when he is having a BM, so he knows hydrating is very important, as he hasn't been having any issue with his BP unless overexerting)  Due to staying hydrated he is having nocturia and due to that, he is scheduled to see his Urologist on 5/30/24 to get a mera, as he has to use his urinal too frequently at nighttime.     Patient aware his appt with Almita HAJI is on 6/4/24, but he is placed on the cancellation list, so if there is a sooner appt, he would get a call from our office at that time.     Patient had no further questions or concerns at this time,  aware to call in if concern arise .

## 2024-05-11 NOTE — CASE COMMUNICATION
This message is informative only.  No action required.     [facility] Reval made 5.9.24 for dc.     Impression. Cognitive linquistic deficits in the following areas:  mild which is an increase from Mild.moderate in immediate memory and organization, WFL which is an increase from Mild in STM, and problem solving.. Mild which is an increase from  Severe in math problems.   Oriented x 3. Observed mild which is an increase from  moderate dysarth rachel in conversational speech characterized with low volume and limited articulatory postures particularly on phonemes t.d.n and l.   WFL volume most of the time which is an increase from Low volume due to limited breath supply. Hearing. Mild [place] Uncertain of level of visual acuity. He reportedly can read newspaper headlines but not print.     Rec.   DC Sp tx due to going to out Pt ST.   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 in near future.   Refer if change in status

## 2024-05-15 ENCOUNTER — TELEPHONE (OUTPATIENT)
Dept: PALLIATIVE MEDICINE | Facility: CLINIC | Age: 69
End: 2024-05-15

## 2024-05-17 ENCOUNTER — TELEPHONE (OUTPATIENT)
Dept: PALLIATIVE MEDICINE | Facility: CLINIC | Age: 69
End: 2024-05-17

## 2024-05-17 NOTE — TELEPHONE ENCOUNTER
Attempted to return call. No answer. I am not sure why the patient might be short of breath. He was recently approved for hospice but then declined hospice in order to follow-up with palliative care, Dr. Zhang saw him most recently. He has Parkinson's disease as his primary palliative diagnosis. If patient is short of breath and in distress he should report to the ED, either via private transport if patient is relatively stable, or call 911 if the patient is in extremis.

## 2024-05-17 NOTE — TELEPHONE ENCOUNTER
Pt son called in and stated father is very short of breath for the past two to three days, pt has no fever, o2 stats are good. But concern on what steps to take. PLEASE ADVICE

## 2024-05-21 ENCOUNTER — TELEPHONE (OUTPATIENT)
Dept: PALLIATIVE MEDICINE | Facility: CLINIC | Age: 69
End: 2024-05-21

## 2024-05-21 NOTE — TELEPHONE ENCOUNTER
"  LSW contacted the [employer] at [phone] and spoke to Cassandra.  Cassandra stated that due to the patient's monthly income, he is over the threshold and would not qualify for the program. Cassandra also looked into  but reports that the patient would have to be between the ages of 18 and 59.     LSW contacted [facility] at [phone] and spoke to Ana.  LSW reviewed that the patient did not qualify for Waiver or Act 150 and inquired on any other programs that may support the patient.  Zaida stated that there is \"The Options\" program but that the patient is still over the threshold and would have to pay 100% of the cost. Ana encouraged this writer to follow up with the patient and inquire on if the patient would be okay with a referral being placed to see how they can help/support the patient with navigating the system. LSW will contact the patient to review the above information.   "

## 2024-05-23 ENCOUNTER — TELEPHONE (OUTPATIENT)
Dept: OTHER | Facility: OTHER | Age: 69
End: 2024-05-23

## 2024-05-23 ENCOUNTER — HOME CARE VISIT (OUTPATIENT)
Dept: HOME HEALTH SERVICES | Facility: HOME HEALTHCARE | Age: 69
End: 2024-05-23
Payer: MEDICARE

## 2024-05-23 ENCOUNTER — HOSPICE ADMISSION (OUTPATIENT)
Dept: HOME HOSPICE | Facility: HOSPICE | Age: 69
End: 2024-05-23
Payer: MEDICARE

## 2024-05-23 DIAGNOSIS — G30.9 ALZHEIMER'S DEMENTIA, UNSPECIFIED DEMENTIA SEVERITY, UNSPECIFIED TIMING OF DEMENTIA ONSET, UNSPECIFIED WHETHER BEHAVIORAL, PSYCHOTIC, OR MOOD DISTURBANCE OR ANXIETY (HCC): Primary | ICD-10-CM

## 2024-05-23 DIAGNOSIS — G20.A2 PARKINSON'S DISEASE WITHOUT DYSKINESIA, WITH FLUCTUATING MANIFESTATIONS: Primary | ICD-10-CM

## 2024-05-23 DIAGNOSIS — F02.80 ALZHEIMER'S DEMENTIA, UNSPECIFIED DEMENTIA SEVERITY, UNSPECIFIED TIMING OF DEMENTIA ONSET, UNSPECIFIED WHETHER BEHAVIORAL, PSYCHOTIC, OR MOOD DISTURBANCE OR ANXIETY (HCC): Primary | ICD-10-CM

## 2024-05-23 NOTE — Clinical Note
Lit Tripp 68yo male  2.. currently at home. Hx Parkinson's Disease followed by Palliative. He was approved by you on  for Parkinsons but not ready for hospice at that time. Patient is now having increased SOB and does not want to go to ED. Still using walker with assistance, incontinent of bladder, needs assistance with feedings. PPS 40. Re-approve?

## 2024-05-23 NOTE — TELEPHONE ENCOUNTER
"Lit is calling in with SOB, weakness and difficulty getting out of bed. Palliative Care calls require paging On Call provider. The follow message was sent via [employer] to on call provider: Jessica Alfaro:  \"Good morning, My name is Sandy contacting from [facility] in regards to a patient calling for Palliative Care [place]. Patient name is: Lit Tripp : 51 MRN:[id number]. He is having Shortness of breath, difficulty getting out of bed and severe weakness. Please be advised he is requesting a call back for assistance. Thank you!\"  "

## 2024-05-24 ENCOUNTER — HOME CARE VISIT (OUTPATIENT)
Dept: HOME HOSPICE | Facility: HOSPICE | Age: 69
End: 2024-05-24
Payer: MEDICARE

## 2024-05-24 ENCOUNTER — HOME CARE VISIT (OUTPATIENT)
Dept: HOME HEALTH SERVICES | Facility: HOME HEALTHCARE | Age: 69
End: 2024-05-24
Payer: MEDICARE

## 2024-05-24 PROCEDURE — G0299 HHS/HOSPICE OF RN EA 15 MIN: HCPCS

## 2024-05-24 PROCEDURE — 10330057 MEDICATION, GENERAL

## 2024-05-25 ENCOUNTER — HOME CARE VISIT (OUTPATIENT)
Dept: HOME HEALTH SERVICES | Facility: HOME HEALTHCARE | Age: 69
End: 2024-05-25
Payer: MEDICARE

## 2024-05-25 PROCEDURE — G0299 HHS/HOSPICE OF RN EA 15 MIN: HCPCS

## 2024-05-26 ENCOUNTER — HOME CARE VISIT (OUTPATIENT)
Dept: HOME HOSPICE | Facility: HOSPICE | Age: 69
End: 2024-05-26
Payer: MEDICARE

## 2024-05-26 VITALS — RESPIRATION RATE: 16 BRPM | TEMPERATURE: 98.1 F

## 2024-05-27 ENCOUNTER — HOME CARE VISIT (OUTPATIENT)
Dept: HOME HOSPICE | Facility: HOSPICE | Age: 69
End: 2024-05-27
Payer: MEDICARE

## 2024-05-27 PROCEDURE — 10330064 INSERTION TRAY, FOLEY 10CC SALINE (20/CS

## 2024-05-27 PROCEDURE — 10330064 CATHETER, FOLEY 2WAY 5CC 16FR (10/BX)

## 2024-05-27 PROCEDURE — 10330064 SYRINGE, LL 10CC (100/BX 12BX/CS)

## 2024-05-27 PROCEDURE — 10330064 UNDERPAD, REUSE 36X36 1DZ     BECKCL

## 2024-05-27 PROCEDURE — 10330064 SYRINGE, CATH TIP FLAT STR 60CC (50/CS)

## 2024-05-27 PROCEDURE — 10330064 BRIEF, WINGS ADLT 2XLG GRN WAIST TO 69"

## 2024-05-27 PROCEDURE — 10330064 CATH SECURE, STATLOCK FOLEY SWIVEL SIL P

## 2024-05-27 PROCEDURE — 10330064 SALINE, IRR SOL STR 100ML (48/CS) MGM37

## 2024-05-27 PROCEDURE — 10330064 BAG, URINE DRN (20/CS)        BARD

## 2024-05-28 ENCOUNTER — HOME CARE VISIT (OUTPATIENT)
Dept: HOME HOSPICE | Facility: HOSPICE | Age: 69
End: 2024-05-28
Payer: MEDICARE

## 2024-05-28 ENCOUNTER — HOME CARE VISIT (OUTPATIENT)
Dept: HOME HEALTH SERVICES | Facility: HOME HEALTHCARE | Age: 69
End: 2024-05-28
Payer: MEDICARE

## 2024-05-28 PROCEDURE — G0155 HHCP-SVS OF CSW,EA 15 MIN: HCPCS

## 2024-05-28 PROCEDURE — G0299 HHS/HOSPICE OF RN EA 15 MIN: HCPCS

## 2024-05-29 ENCOUNTER — HOME CARE VISIT (OUTPATIENT)
Dept: HOME HOSPICE | Facility: HOSPICE | Age: 69
End: 2024-05-29
Payer: MEDICARE

## 2024-05-29 ENCOUNTER — HOME CARE VISIT (OUTPATIENT)
Dept: HOME HEALTH SERVICES | Facility: HOME HEALTHCARE | Age: 69
End: 2024-05-29
Payer: MEDICARE

## 2024-05-29 PROCEDURE — G0156 HHCP-SVS OF AIDE,EA 15 MIN: HCPCS

## 2024-05-30 ENCOUNTER — HOME CARE VISIT (OUTPATIENT)
Dept: HOME HEALTH SERVICES | Facility: HOME HEALTHCARE | Age: 69
End: 2024-05-30
Payer: MEDICARE

## 2024-05-30 PROCEDURE — G0156 HHCP-SVS OF AIDE,EA 15 MIN: HCPCS

## 2024-05-30 PROCEDURE — G0299 HHS/HOSPICE OF RN EA 15 MIN: HCPCS

## 2024-05-31 ENCOUNTER — HOME CARE VISIT (OUTPATIENT)
Dept: HOME HOSPICE | Facility: HOSPICE | Age: 69
End: 2024-05-31
Payer: MEDICARE

## 2024-06-03 ENCOUNTER — HOME CARE VISIT (OUTPATIENT)
Dept: HOME HEALTH SERVICES | Facility: HOME HEALTHCARE | Age: 69
End: 2024-06-03
Payer: MEDICARE

## 2024-06-03 ENCOUNTER — HOME CARE VISIT (OUTPATIENT)
Dept: HOME HOSPICE | Facility: HOSPICE | Age: 69
End: 2024-06-03
Payer: MEDICARE

## 2024-06-03 PROCEDURE — G0299 HHS/HOSPICE OF RN EA 15 MIN: HCPCS

## 2024-06-03 PROCEDURE — G0156 HHCP-SVS OF AIDE,EA 15 MIN: HCPCS

## 2024-06-03 NOTE — CASE COMMUNICATION
Ship to  . Home    Hydrocolloids   Sacral [id number] ____12

## 2024-06-04 ENCOUNTER — HOME CARE VISIT (OUTPATIENT)
Dept: HOME HOSPICE | Facility: HOSPICE | Age: 69
End: 2024-06-04
Payer: MEDICARE

## 2024-06-04 ENCOUNTER — HOME CARE VISIT (OUTPATIENT)
Dept: HOME HEALTH SERVICES | Facility: HOME HEALTHCARE | Age: 69
End: 2024-06-04
Payer: MEDICARE

## 2024-06-04 PROCEDURE — G0156 HHCP-SVS OF AIDE,EA 15 MIN: HCPCS

## 2024-06-05 ENCOUNTER — HOME CARE VISIT (OUTPATIENT)
Dept: HOME HOSPICE | Facility: HOSPICE | Age: 69
End: 2024-06-05
Payer: MEDICARE

## 2024-06-06 ENCOUNTER — HOME CARE VISIT (OUTPATIENT)
Dept: HOME HEALTH SERVICES | Facility: HOME HEALTHCARE | Age: 69
End: 2024-06-06
Payer: MEDICARE

## 2024-06-06 PROCEDURE — G0299 HHS/HOSPICE OF RN EA 15 MIN: HCPCS

## 2024-06-07 ENCOUNTER — HOME CARE VISIT (OUTPATIENT)
Dept: HOME HEALTH SERVICES | Facility: HOME HEALTHCARE | Age: 69
End: 2024-06-07
Payer: MEDICARE

## 2024-06-07 ENCOUNTER — HOME CARE VISIT (OUTPATIENT)
Dept: HOME HOSPICE | Facility: HOSPICE | Age: 69
End: 2024-06-07
Payer: MEDICARE

## 2024-06-07 PROCEDURE — G0156 HHCP-SVS OF AIDE,EA 15 MIN: HCPCS

## 2024-06-08 ENCOUNTER — HOME CARE VISIT (OUTPATIENT)
Dept: HOME HOSPICE | Facility: HOSPICE | Age: 69
End: 2024-06-08
Payer: MEDICARE

## 2024-06-10 ENCOUNTER — HOME CARE VISIT (OUTPATIENT)
Dept: HOME HEALTH SERVICES | Facility: HOME HEALTHCARE | Age: 69
End: 2024-06-10
Payer: MEDICARE

## 2024-06-10 VITALS
RESPIRATION RATE: 16 BRPM | SYSTOLIC BLOOD PRESSURE: 124 MMHG | TEMPERATURE: 98.4 F | HEART RATE: 80 BPM | DIASTOLIC BLOOD PRESSURE: 80 MMHG

## 2024-06-10 VITALS
HEART RATE: 70 BPM | DIASTOLIC BLOOD PRESSURE: 64 MMHG | SYSTOLIC BLOOD PRESSURE: 110 MMHG | OXYGEN SATURATION: 95 % | RESPIRATION RATE: 20 BRPM

## 2024-06-10 PROCEDURE — G0151 HHCP-SERV OF PT,EA 15 MIN: HCPCS

## 2024-06-10 PROCEDURE — G0299 HHS/HOSPICE OF RN EA 15 MIN: HCPCS

## 2024-06-11 ENCOUNTER — HOME CARE VISIT (OUTPATIENT)
Dept: HOME HOSPICE | Facility: HOSPICE | Age: 69
End: 2024-06-11
Payer: MEDICARE

## 2024-06-11 ENCOUNTER — HOME CARE VISIT (OUTPATIENT)
Dept: HOME HEALTH SERVICES | Facility: HOME HEALTHCARE | Age: 69
End: 2024-06-11
Payer: MEDICARE

## 2024-06-11 PROCEDURE — G0156 HHCP-SVS OF AIDE,EA 15 MIN: HCPCS

## 2024-06-12 ENCOUNTER — HOME CARE VISIT (OUTPATIENT)
Dept: HOME HOSPICE | Facility: HOSPICE | Age: 69
End: 2024-06-12
Payer: MEDICARE

## 2024-06-12 ENCOUNTER — HOME CARE VISIT (OUTPATIENT)
Dept: HOME HEALTH SERVICES | Facility: HOME HEALTHCARE | Age: 69
End: 2024-06-12
Payer: MEDICARE

## 2024-06-12 PROCEDURE — G0299 HHS/HOSPICE OF RN EA 15 MIN: HCPCS

## 2024-06-13 ENCOUNTER — HOME CARE VISIT (OUTPATIENT)
Dept: HOME HOSPICE | Facility: HOSPICE | Age: 69
End: 2024-06-13
Payer: MEDICARE

## 2024-06-13 ENCOUNTER — HOME CARE VISIT (OUTPATIENT)
Dept: HOME HEALTH SERVICES | Facility: HOME HEALTHCARE | Age: 69
End: 2024-06-13
Payer: MEDICARE

## 2024-06-13 PROCEDURE — G0156 HHCP-SVS OF AIDE,EA 15 MIN: HCPCS

## 2024-06-13 PROCEDURE — G0155 HHCP-SVS OF CSW,EA 15 MIN: HCPCS

## 2024-06-13 NOTE — CASE COMMUNICATION
Per patient and family request pt will go to [facility] for respite.  Transport arranged for pickup on June 14 1:05 pm to take pt to the facility. Pt will return to home June 19, MSW to attange transport closer to that date.  Upon return home pt and family have arranged for paid caregivers to help several days a week.  HFM to provide meds during respite.

## 2024-06-14 ENCOUNTER — HOME CARE VISIT (OUTPATIENT)
Dept: HOME HEALTH SERVICES | Facility: HOME HEALTHCARE | Age: 69
End: 2024-06-14
Payer: MEDICARE

## 2024-06-14 VITALS — RESPIRATION RATE: 16 BRPM | OXYGEN SATURATION: 96 % | HEART RATE: 89 BPM

## 2024-06-14 PROCEDURE — G0299 HHS/HOSPICE OF RN EA 15 MIN: HCPCS

## 2024-06-14 PROCEDURE — G0156 HHCP-SVS OF AIDE,EA 15 MIN: HCPCS

## 2024-06-15 ENCOUNTER — HOME CARE VISIT (OUTPATIENT)
Dept: HOME HOSPICE | Facility: HOSPICE | Age: 69
End: 2024-06-15
Payer: MEDICARE

## 2024-06-15 ENCOUNTER — HOME CARE VISIT (OUTPATIENT)
Dept: HOME HEALTH SERVICES | Facility: HOME HEALTHCARE | Age: 69
End: 2024-06-15
Payer: MEDICARE

## 2024-06-15 PROCEDURE — G0299 HHS/HOSPICE OF RN EA 15 MIN: HCPCS

## 2024-06-16 ENCOUNTER — HOME CARE VISIT (OUTPATIENT)
Dept: HOME HOSPICE | Facility: HOSPICE | Age: 69
End: 2024-06-16
Payer: MEDICARE

## 2024-06-16 ENCOUNTER — TELEPHONE (OUTPATIENT)
Dept: OTHER | Facility: OTHER | Age: 69
End: 2024-06-16

## 2024-06-16 ENCOUNTER — HOME CARE VISIT (OUTPATIENT)
Dept: HOME HEALTH SERVICES | Facility: HOME HEALTHCARE | Age: 69
End: 2024-06-16
Payer: MEDICARE

## 2024-06-16 PROCEDURE — G0156 HHCP-SVS OF AIDE,EA 15 MIN: HCPCS

## 2024-06-17 ENCOUNTER — NURSING HOME VISIT (OUTPATIENT)
Dept: GERIATRICS | Facility: OTHER | Age: 69
End: 2024-06-17
Payer: MEDICARE

## 2024-06-17 DIAGNOSIS — I82.4Y2 ACUTE DEEP VEIN THROMBOSIS (DVT) OF PROXIMAL VEIN OF LEFT LOWER EXTREMITY (HCC): ICD-10-CM

## 2024-06-17 DIAGNOSIS — F43.20 ADJUSTMENT DISORDER, UNSPECIFIED TYPE: ICD-10-CM

## 2024-06-17 DIAGNOSIS — G20.A2 PARKINSON'S DISEASE WITHOUT DYSKINESIA, WITH FLUCTUATING MANIFESTATIONS: Primary | ICD-10-CM

## 2024-06-17 DIAGNOSIS — K59.09 CHRONIC CONSTIPATION: ICD-10-CM

## 2024-06-17 PROBLEM — E29.1 HYPOGONADISM IN MALE: Status: RESOLVED | Noted: 2018-01-05 | Resolved: 2024-06-17

## 2024-06-17 PROBLEM — I82.402 ACUTE DEEP VEIN THROMBOSIS (DVT) OF LEFT LOWER EXTREMITY (HCC): Status: ACTIVE | Noted: 2024-04-02

## 2024-06-17 PROBLEM — E66.9 OBESITY: Status: RESOLVED | Noted: 2024-06-17 | Resolved: 2024-06-17

## 2024-06-17 PROBLEM — R33.9 INCOMPLETE EMPTYING OF BLADDER: Chronic | Status: RESOLVED | Noted: 2024-01-09 | Resolved: 2024-06-17

## 2024-06-17 PROBLEM — L60.3 NAIL DYSTROPHY: Status: RESOLVED | Noted: 2021-01-20 | Resolved: 2024-06-17

## 2024-06-17 PROBLEM — N40.0 BPH (BENIGN PROSTATIC HYPERPLASIA): Status: ACTIVE | Noted: 2018-01-05

## 2024-06-17 PROBLEM — E87.1 HYPONATREMIA: Status: RESOLVED | Noted: 2024-01-05 | Resolved: 2024-06-17

## 2024-06-17 PROBLEM — U07.1 COVID-19: Status: RESOLVED | Noted: 2024-01-09 | Resolved: 2024-06-17

## 2024-06-17 PROBLEM — B35.1 ONYCHOMYCOSIS: Status: RESOLVED | Noted: 2017-05-09 | Resolved: 2024-06-17

## 2024-06-17 PROBLEM — N39.0 COMPLICATED UTI (URINARY TRACT INFECTION): Status: RESOLVED | Noted: 2024-04-02 | Resolved: 2024-06-17

## 2024-06-17 PROCEDURE — 99305 1ST NF CARE MODERATE MDM 35: CPT | Performed by: INTERNAL MEDICINE

## 2024-06-17 NOTE — ASSESSMENT & PLAN NOTE
Pt unable to move his right arm  Hx of Parkinson's disease  Cont entacapone 200 mg 1 tab po 4x daily  Cont carbidopa-levodopa  mg 1.5 tablets by mouth 4x daily & 0.5 tablet by mouth at bedtime    Pt on hospice  Cont lorazepam 0.5 1/2 tab po q4 hours prn anxiety  Cont oxycodone 5 mg 1 tab po q4 hours prn pain

## 2024-06-17 NOTE — PROGRESS NOTES
"   [facility]  [address]  [place] [address]  [id number]    Nursing Home Admission    NAME: Lit Tripp  AGE: 69 y.o. SEX: male [id number]    DATE OF ENCOUNTER: 6/17/2024    Assessment/Plan:   Patient’s care was coordinated with nursing facility staff. Recent vitals, labs and updated medications were reviewed on [facility] system of facility. Past Medical, surgical, social, medication and allergy history and patient’s previous records reviewed.     1. Parkinson's disease without dyskinesia, with fluctuating manifestations        2. Adjustment disorder, unspecified type        3. Acute deep vein thrombosis (DVT) of proximal vein of left lower extremity (HCC)        4. Chronic constipation             Parkinson's disease without dyskinesia, with fluctuating manifestations  Pt unable to move his right arm  Hx of Parkinson's disease  Cont entacapone 200 mg 1 tab po 4x daily  Cont carbidopa-levodopa  mg 1.5 tablets by mouth 4x daily & 0.5 tablet by mouth at bedtime    Pt on hospice  Cont lorazepam 0.5 1/2 tab po q4 hours prn anxiety  Cont oxycodone 5 mg 1 tab po q4 hours prn pain    Adjustment disorder  Stable  Cont escitalopram 10 mg 1 tab po daily    Acute deep vein thrombosis (DVT) of left lower extremity (HCC)  Stable  Cont eliquis 5 mg 1 tab po BID    Chronic constipation  Pt often constipated  Pt on narcotics  Last BM 6/15  Cont milk of magnesia 30 cc po q72 hours prn constipation  Cont senna-S 8.6-50 mg 2 tabs po daily  Cont polyethylene glycol 17 g po daily for constipation          Chief Complaint     Here for STR    HPI   Patient is a 69 y.o. male with PMH Parkinson's disease, Orthostatic hypotension, HTN, Psoriasis, and Arthritis.    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 He is at the facility for a 5 day hospice respite stay. " Previously he lived at home with his wife and son Tera. He lives in a 2 story home with 1st floor set up. His son, wife and grandson completed his IADL's. He needed assistance with his ADL's. He did have assistance from  VNA and  hospice. His medical equipment is a wheelchair, walker, cane, hospital bed, rollater, catalina lift and a lift chair. His discharge plan is to return home with his family.     Pt admitted as respite care under hospice services.    Pt seen and examined. Pt appears A&Ox3. Pt asking to have staff help him be dressed for am services.       Past Medical History:   Diagnosis Date    Arthritis     Complicated UTI (urinary tract infection) 2024    COVID-19 2024    Hypertension     Hyponatremia 2024    Incomplete emptying of bladder 2024    Nail dystrophy 2021    Obesity 2024    Onychomycosis 2017    Parkinson's disease     2 years ago    Psoriasis        Past Surgical History:   Procedure Laterality Date    APPENDECTOMY      CHOLECYSTECTOMY      COLONOSCOPY      TESTICLE BIOPSY         Social History     Tobacco Use   Smoking Status Former    Current packs/day: 0.00    Types: Cigarettes    Quit date:     Years since quittin.4   Smokeless Tobacco Never          Family History   Problem Relation Age of Onset    Parkinsonism Mother         osnet in late 70s, lived into 90s.     Prostate cancer Father     Psoriasis Father     Diabetes Maternal Grandmother     Stroke Paternal Grandmother     Diabetes Paternal Grandfather     Parkinsonism Cousin         1st cousin         No Known Allergies       Dulcolax Insert 1 suppository rectally every 72 hours as needed for constipation Administer every 3rd day as needed. 2024       Fleet Enema Insert 1 unit rectally every 72 hours as needed for constipation Q 3rd day PRN Pharmacy Active 2024 11:04  2024   Milk of Magnesia Give 30 ml by mouth every 72 hours as needed for constipation  every 3 days as needed Pharmacy Active 6/14/2024 11:04  6/14/2024   Tylenol 325mg (pain) Give 2 tablet by mouth every 4 hours as needed for Mild Pain Pharmacy Active 6/14/2024 11:04  6/14/2024   Tylenol 325mg (fever) Give 2 tablet by mouth every 4 hours as needed for fever Administer 2 tablets (650MG) by mouth every 4 hours as needed for increased temperature. Document temperature (Max 3GM/24HR) Pharmacy Active 6/14/2024 11:04  6/14/2024   ELIQUIS TAB 5MG 1 TAB BY MOUTH EVERY 12 HOURS FOR DVT Pharmacy Active 6/14/2024 21:00  6/14/2024   ENTACAPONE TAB 200MG 1 TAB BY MOUTH FOUR TIMES DAILY FOR PARKINSONS(Indications for Use: Parkinson's) Pharmacy Active 6/14/2024 17:00  6/14/2024   ESCITALOPRAM 10MG TABS 1 TAB BY MOUTH ONCE DAILY FOR DEPRESSION Pharmacy Active 6/15/2024 09:00  6/14/2024   HYOSCYAMINE SUB 0.125MG 1 TAB BY MOUTH EVERY 4 HOURS AS NEEDED FOR SECRETIONS Pharmacy Active 6/14/2024 15:33  6/14/2024   LIDOCAINE OIN 5% APPLY TOPICALLY TO RECTAL AREA THREE TIMES DAILY AS NEEDED FOR PAIN/SPASMS(Indications for Use: spasms/pain) Pharmacy Active 6/14/2024 15:34  6/14/2024   CARBIDOPA-LEVODOP  MG 1 AND 1/2 TABLETS BY MOUTH FOUR TIMES DAILY FOR PARKINSONS(Indications for Use: Parkinson's) AND 1/2 TABLET BY MOUTH AT BEDTIME FOR PARKINSONS(Indications for Use: Parkinson's) Pharmacy Active 6/14/2024 17:00  6/14/2024   LORAZEPAM TAB 0.5MG 1/2 TABLET (0.25MG) BY MOUTH EVERY 4 HOURS AS NEEDED FOR ANXIETY OR INSOMNIA OR DYSPNEA FOR 14 DAYS(Indications for Use: anxiety/insomnia/dyspnea) Pharmacy Active 6/14/2024 15:38 6/28/2024 6/14/2024   OxyCODONE TAB 5MG 1 TAB BY MOUTH EVERY 4 HOURS AS NEEDED FOR MODERATE PAIN OR SEVERE PAIN OR DYSPNEA *NOTIFY PCP IF ADRS NOTED*(Indications for Use: moderate and severe pain) Pharmacy Active 6/14/2024 15:39  6/15/2024   POLYETHYLENE GLYCOL 3350 MEASURE 17GMS IN DOSING CAP, MIX WITH SUITABLE LIQUID AND ADMINISTER BY MOUTH ONCE DAILY FOR CONSTIPATION Pharmacy Active 6/14/2024 09:00   6/14/2024   PredniSONE TAB 20MG 3 TABS (60MG) BY MOUTH ONCE DAILY FOR ORTHOSTATIC HYPOTENSION Pharmacy Active 6/15/2024 09:00  6/14/2024   SENNA-S 8.6-50MG TAB 2 TABS BY MOUTH ONCE DAILY FOR CONSTIPATION Pharmacy Active 6/15/2024 09:00  6/14/2024   IBUPROFEN 400MG TABS 1 TAB BY MOUTH EVERY 8 HOURS AS NEEDED FOR MODERATE PAIN Pharmacy         Updated list was reviewed in [facility] system of facility.     Vital signs were reviewed in point [facility] care    A 12-point comprehensive review of symptoms was obtained.  Pertinent positives and negatives noted in HPI.   Review of Systems   Constitutional:  Negative for chills and fever.   Neurological:  Negative for headaches.   All other systems reviewed and are negative.      Physical Exam  Constitutional:       General: He is not in acute distress.     Appearance: He is not ill-appearing.   HENT:      Head: Normocephalic.   Cardiovascular:      Rate and Rhythm: Regular rhythm.   Pulmonary:      Effort: No respiratory distress.      Breath sounds: Normal breath sounds. No wheezing.   Abdominal:      General: Bowel sounds are normal. There is no distension.      Palpations: Abdomen is soft.      Tenderness: There is no abdominal tenderness.   Musculoskeletal:      Right lower leg: No edema.      Left lower leg: No edema.   Neurological:      Mental Status: He is alert and oriented to person, place, and time.      Cranial Nerves: Cranial nerves 2-12 are intact.      Motor: Weakness present. No tremor.      Coordination: Finger-Nose-Finger Test normal.      Comments: Pt with weakness in his Rt arm   Psychiatric:         Mood and Affect: Mood normal.         Behavior: Behavior normal.         Thought Content: Thought content normal.         Judgment: Judgment normal.       Diagnostic Data   Recent labs and imaging studies were reviewed.  Code Status:    DNR/DNI/Hospice    This note was electronically signed by Dr. Leanna Cedillo  [facility]

## 2024-06-17 NOTE — ASSESSMENT & PLAN NOTE
Pt often constipated  Pt on narcotics  Last BM 6/15  Cont milk of magnesia 30 cc po q72 hours prn constipation  Cont senna-S 8.6-50 mg 2 tabs po daily  Cont polyethylene glycol 17 g po daily for constipation

## 2024-06-18 ENCOUNTER — HOME CARE VISIT (OUTPATIENT)
Dept: HOME HOSPICE | Facility: HOSPICE | Age: 69
End: 2024-06-18
Payer: MEDICARE

## 2024-06-18 ENCOUNTER — HOME CARE VISIT (OUTPATIENT)
Dept: HOME HEALTH SERVICES | Facility: HOME HEALTHCARE | Age: 69
End: 2024-06-18
Payer: MEDICARE

## 2024-06-18 VITALS
HEART RATE: 84 BPM | TEMPERATURE: 96.8 F | SYSTOLIC BLOOD PRESSURE: 92 MMHG | DIASTOLIC BLOOD PRESSURE: 64 MMHG | RESPIRATION RATE: 20 BRPM

## 2024-06-18 PROCEDURE — G0156 HHCP-SVS OF AIDE,EA 15 MIN: HCPCS

## 2024-06-18 PROCEDURE — G0299 HHS/HOSPICE OF RN EA 15 MIN: HCPCS

## 2024-06-18 PROCEDURE — G0155 HHCP-SVS OF CSW,EA 15 MIN: HCPCS

## 2024-06-19 ENCOUNTER — HOME CARE VISIT (OUTPATIENT)
Dept: HOME HEALTH SERVICES | Facility: HOME HEALTHCARE | Age: 69
End: 2024-06-19
Payer: MEDICARE

## 2024-06-19 ENCOUNTER — HOME CARE VISIT (OUTPATIENT)
Dept: HOME HOSPICE | Facility: HOSPICE | Age: 69
End: 2024-06-19
Payer: MEDICARE

## 2024-06-19 PROCEDURE — G0299 HHS/HOSPICE OF RN EA 15 MIN: HCPCS

## 2024-06-20 ENCOUNTER — HOME CARE VISIT (OUTPATIENT)
Dept: HOME HEALTH SERVICES | Facility: HOME HEALTHCARE | Age: 69
End: 2024-06-20
Payer: MEDICARE

## 2024-06-20 PROCEDURE — G0156 HHCP-SVS OF AIDE,EA 15 MIN: HCPCS

## 2024-06-22 ENCOUNTER — HOME CARE VISIT (OUTPATIENT)
Dept: HOME HOSPICE | Facility: HOSPICE | Age: 69
End: 2024-06-22
Payer: MEDICARE

## 2024-06-22 ENCOUNTER — HOME CARE VISIT (OUTPATIENT)
Dept: HOME HEALTH SERVICES | Facility: HOME HEALTHCARE | Age: 69
End: 2024-06-22
Payer: MEDICARE

## 2024-06-22 PROCEDURE — G0156 HHCP-SVS OF AIDE,EA 15 MIN: HCPCS

## 2024-06-24 ENCOUNTER — HOME CARE VISIT (OUTPATIENT)
Dept: HOME HEALTH SERVICES | Facility: HOME HEALTHCARE | Age: 69
End: 2024-06-24
Payer: MEDICARE

## 2024-06-24 PROCEDURE — G0156 HHCP-SVS OF AIDE,EA 15 MIN: HCPCS

## 2024-06-25 ENCOUNTER — HOME CARE VISIT (OUTPATIENT)
Dept: HOME HEALTH SERVICES | Facility: HOME HEALTHCARE | Age: 69
End: 2024-06-25
Payer: MEDICARE

## 2024-06-25 VITALS — SYSTOLIC BLOOD PRESSURE: 108 MMHG | RESPIRATION RATE: 18 BRPM | HEART RATE: 74 BPM | DIASTOLIC BLOOD PRESSURE: 76 MMHG

## 2024-06-25 PROCEDURE — G0156 HHCP-SVS OF AIDE,EA 15 MIN: HCPCS

## 2024-06-25 PROCEDURE — G0299 HHS/HOSPICE OF RN EA 15 MIN: HCPCS

## 2024-06-26 ENCOUNTER — HOME CARE VISIT (OUTPATIENT)
Dept: HOME HEALTH SERVICES | Facility: HOME HEALTHCARE | Age: 69
End: 2024-06-26
Payer: MEDICARE

## 2024-06-26 ENCOUNTER — HOME CARE VISIT (OUTPATIENT)
Dept: HOME HOSPICE | Facility: HOSPICE | Age: 69
End: 2024-06-26
Payer: MEDICARE

## 2024-06-26 PROCEDURE — G0299 HHS/HOSPICE OF RN EA 15 MIN: HCPCS

## 2024-06-27 ENCOUNTER — HOME CARE VISIT (OUTPATIENT)
Dept: HOME HEALTH SERVICES | Facility: HOME HEALTHCARE | Age: 69
End: 2024-06-27
Payer: MEDICARE

## 2024-06-27 PROCEDURE — G0156 HHCP-SVS OF AIDE,EA 15 MIN: HCPCS

## 2024-06-28 ENCOUNTER — HOME CARE VISIT (OUTPATIENT)
Dept: HOME HEALTH SERVICES | Facility: HOME HEALTHCARE | Age: 69
End: 2024-06-28
Payer: MEDICARE

## 2024-06-28 PROCEDURE — G0156 HHCP-SVS OF AIDE,EA 15 MIN: HCPCS

## 2024-06-28 PROCEDURE — G0299 HHS/HOSPICE OF RN EA 15 MIN: HCPCS

## 2024-06-30 ENCOUNTER — HOME CARE VISIT (OUTPATIENT)
Dept: HOME HEALTH SERVICES | Facility: HOME HEALTHCARE | Age: 69
End: 2024-06-30
Payer: MEDICARE

## 2024-06-30 ENCOUNTER — HOME CARE VISIT (OUTPATIENT)
Dept: HOME HOSPICE | Facility: HOSPICE | Age: 69
End: 2024-06-30
Payer: MEDICARE

## 2024-06-30 VITALS — HEART RATE: 70 BPM | RESPIRATION RATE: 21 BRPM | SYSTOLIC BLOOD PRESSURE: 104 MMHG | DIASTOLIC BLOOD PRESSURE: 62 MMHG

## 2024-06-30 PROCEDURE — G0299 HHS/HOSPICE OF RN EA 15 MIN: HCPCS

## 2024-06-30 PROCEDURE — G0156 HHCP-SVS OF AIDE,EA 15 MIN: HCPCS

## 2024-07-01 ENCOUNTER — HOME CARE VISIT (OUTPATIENT)
Dept: HOME HEALTH SERVICES | Facility: HOME HEALTHCARE | Age: 69
End: 2024-07-01
Payer: MEDICARE

## 2024-07-01 PROCEDURE — G0156 HHCP-SVS OF AIDE,EA 15 MIN: HCPCS

## 2024-07-02 ENCOUNTER — HOME CARE VISIT (OUTPATIENT)
Dept: HOME HOSPICE | Facility: HOSPICE | Age: 69
End: 2024-07-02
Payer: MEDICARE

## 2024-07-03 ENCOUNTER — HOME CARE VISIT (OUTPATIENT)
Dept: HOME HEALTH SERVICES | Facility: HOME HEALTHCARE | Age: 69
End: 2024-07-03
Payer: MEDICARE

## 2024-07-03 ENCOUNTER — HOME CARE VISIT (OUTPATIENT)
Dept: HOME HEALTH SERVICES | Facility: HOME HEALTHCARE | Age: 69
End: 2024-07-03

## 2024-07-03 PROCEDURE — G0299 HHS/HOSPICE OF RN EA 15 MIN: HCPCS

## 2024-07-03 PROCEDURE — G0156 HHCP-SVS OF AIDE,EA 15 MIN: HCPCS

## 2024-07-06 ENCOUNTER — HOME CARE VISIT (OUTPATIENT)
Dept: HOME HOSPICE | Facility: HOSPICE | Age: 69
End: 2024-07-06
Payer: MEDICARE

## 2024-07-06 ENCOUNTER — HOME CARE VISIT (OUTPATIENT)
Dept: HOME HEALTH SERVICES | Facility: HOME HEALTHCARE | Age: 69
End: 2024-07-06
Payer: MEDICARE

## 2024-07-06 PROCEDURE — G0156 HHCP-SVS OF AIDE,EA 15 MIN: HCPCS

## 2024-07-07 ENCOUNTER — TELEPHONE (OUTPATIENT)
Dept: OTHER | Facility: OTHER | Age: 69
End: 2024-07-07

## 2024-07-07 NOTE — TELEPHONE ENCOUNTER
Patient called today regarding he would like a call back on Monday before the Scheduled Family Meeting, Please call Patient back before the meeting.

## 2024-07-09 ENCOUNTER — HOME CARE VISIT (OUTPATIENT)
Dept: HOME HEALTH SERVICES | Facility: HOME HEALTHCARE | Age: 69
End: 2024-07-09
Payer: MEDICARE

## 2024-07-09 VITALS
HEART RATE: 72 BPM | TEMPERATURE: 98.3 F | RESPIRATION RATE: 18 BRPM | SYSTOLIC BLOOD PRESSURE: 130 MMHG | DIASTOLIC BLOOD PRESSURE: 72 MMHG

## 2024-07-09 PROCEDURE — G0156 HHCP-SVS OF AIDE,EA 15 MIN: HCPCS

## 2024-07-09 PROCEDURE — G0299 HHS/HOSPICE OF RN EA 15 MIN: HCPCS

## 2024-07-10 ENCOUNTER — HOME CARE VISIT (OUTPATIENT)
Dept: HOME HEALTH SERVICES | Facility: HOME HEALTHCARE | Age: 69
End: 2024-07-10
Payer: MEDICARE

## 2024-07-10 PROCEDURE — G0156 HHCP-SVS OF AIDE,EA 15 MIN: HCPCS

## 2024-07-11 ENCOUNTER — HOME CARE VISIT (OUTPATIENT)
Dept: HOME HOSPICE | Facility: HOSPICE | Age: 69
End: 2024-07-11
Payer: MEDICARE

## 2024-07-11 ENCOUNTER — HOME CARE VISIT (OUTPATIENT)
Dept: HOME HEALTH SERVICES | Facility: HOME HEALTHCARE | Age: 69
End: 2024-07-11
Payer: MEDICARE

## 2024-07-11 PROCEDURE — G0156 HHCP-SVS OF AIDE,EA 15 MIN: HCPCS

## 2024-07-12 ENCOUNTER — HOME CARE VISIT (OUTPATIENT)
Dept: HOME HEALTH SERVICES | Facility: HOME HEALTHCARE | Age: 69
End: 2024-07-12
Payer: MEDICARE

## 2024-07-12 PROCEDURE — G0299 HHS/HOSPICE OF RN EA 15 MIN: HCPCS

## 2024-07-12 PROCEDURE — G0156 HHCP-SVS OF AIDE,EA 15 MIN: HCPCS

## 2024-07-15 ENCOUNTER — HOME CARE VISIT (OUTPATIENT)
Dept: HOME HEALTH SERVICES | Facility: HOME HEALTHCARE | Age: 69
End: 2024-07-15
Payer: MEDICARE

## 2024-07-15 PROCEDURE — G0156 HHCP-SVS OF AIDE,EA 15 MIN: HCPCS

## 2024-07-16 ENCOUNTER — HOME CARE VISIT (OUTPATIENT)
Dept: HOME HOSPICE | Facility: HOSPICE | Age: 69
End: 2024-07-16
Payer: MEDICARE

## 2024-07-16 ENCOUNTER — HOME CARE VISIT (OUTPATIENT)
Dept: HOME HEALTH SERVICES | Facility: HOME HEALTHCARE | Age: 69
End: 2024-07-16
Payer: MEDICARE

## 2024-07-16 VITALS
DIASTOLIC BLOOD PRESSURE: 60 MMHG | HEART RATE: 82 BPM | TEMPERATURE: 97.7 F | RESPIRATION RATE: 18 BRPM | SYSTOLIC BLOOD PRESSURE: 110 MMHG

## 2024-07-16 PROCEDURE — G0155 HHCP-SVS OF CSW,EA 15 MIN: HCPCS

## 2024-07-16 PROCEDURE — G0156 HHCP-SVS OF AIDE,EA 15 MIN: HCPCS

## 2024-07-16 PROCEDURE — G0299 HHS/HOSPICE OF RN EA 15 MIN: HCPCS

## 2024-07-18 ENCOUNTER — HOME CARE VISIT (OUTPATIENT)
Dept: HOME HEALTH SERVICES | Facility: HOME HEALTHCARE | Age: 69
End: 2024-07-18
Payer: MEDICARE

## 2024-07-18 PROCEDURE — G0156 HHCP-SVS OF AIDE,EA 15 MIN: HCPCS

## 2024-07-19 ENCOUNTER — HOME CARE VISIT (OUTPATIENT)
Dept: HOME HEALTH SERVICES | Facility: HOME HEALTHCARE | Age: 69
End: 2024-07-19
Payer: MEDICARE

## 2024-07-19 ENCOUNTER — HOME CARE VISIT (OUTPATIENT)
Dept: HOME HOSPICE | Facility: HOSPICE | Age: 69
End: 2024-07-19
Payer: MEDICARE

## 2024-07-19 PROCEDURE — G0156 HHCP-SVS OF AIDE,EA 15 MIN: HCPCS

## 2024-07-21 ENCOUNTER — HOME CARE VISIT (OUTPATIENT)
Dept: HOME HEALTH SERVICES | Facility: HOME HEALTHCARE | Age: 69
End: 2024-07-21
Payer: MEDICARE

## 2024-07-21 VITALS
OXYGEN SATURATION: 98 % | RESPIRATION RATE: 18 BRPM | TEMPERATURE: 97.3 F | SYSTOLIC BLOOD PRESSURE: 120 MMHG | HEART RATE: 63 BPM | DIASTOLIC BLOOD PRESSURE: 70 MMHG

## 2024-07-21 PROCEDURE — G0299 HHS/HOSPICE OF RN EA 15 MIN: HCPCS

## 2024-07-22 ENCOUNTER — HOME CARE VISIT (OUTPATIENT)
Dept: HOME HEALTH SERVICES | Facility: HOME HEALTHCARE | Age: 69
End: 2024-07-22
Payer: MEDICARE

## 2024-07-22 PROCEDURE — G0156 HHCP-SVS OF AIDE,EA 15 MIN: HCPCS

## 2024-07-23 ENCOUNTER — HOME CARE VISIT (OUTPATIENT)
Dept: HOME HEALTH SERVICES | Facility: HOME HEALTHCARE | Age: 69
End: 2024-07-23
Payer: MEDICARE

## 2024-07-23 ENCOUNTER — HOME CARE VISIT (OUTPATIENT)
Dept: HOME HOSPICE | Facility: HOSPICE | Age: 69
End: 2024-07-23
Payer: MEDICARE

## 2024-07-23 PROCEDURE — G0156 HHCP-SVS OF AIDE,EA 15 MIN: HCPCS

## 2024-07-24 ENCOUNTER — HOME CARE VISIT (OUTPATIENT)
Dept: HOME HEALTH SERVICES | Facility: HOME HEALTHCARE | Age: 69
End: 2024-07-24
Payer: MEDICARE

## 2024-07-24 PROCEDURE — G0156 HHCP-SVS OF AIDE,EA 15 MIN: HCPCS

## 2024-07-25 ENCOUNTER — HOME CARE VISIT (OUTPATIENT)
Dept: HOME HEALTH SERVICES | Facility: HOME HEALTHCARE | Age: 69
End: 2024-07-25
Payer: MEDICARE

## 2024-07-25 PROCEDURE — G0156 HHCP-SVS OF AIDE,EA 15 MIN: HCPCS

## 2024-07-26 ENCOUNTER — HOME CARE VISIT (OUTPATIENT)
Dept: HOME HEALTH SERVICES | Facility: HOME HEALTHCARE | Age: 69
End: 2024-07-26
Payer: MEDICARE

## 2024-07-26 ENCOUNTER — HOME CARE VISIT (OUTPATIENT)
Dept: HOME HOSPICE | Facility: HOSPICE | Age: 69
End: 2024-07-26
Payer: MEDICARE

## 2024-07-26 PROCEDURE — G0156 HHCP-SVS OF AIDE,EA 15 MIN: HCPCS

## 2024-07-26 PROCEDURE — G0299 HHS/HOSPICE OF RN EA 15 MIN: HCPCS

## 2024-07-28 ENCOUNTER — HOME CARE VISIT (OUTPATIENT)
Dept: HOME HEALTH SERVICES | Facility: HOME HEALTHCARE | Age: 69
End: 2024-07-28
Payer: MEDICARE

## 2024-07-28 ENCOUNTER — HOME CARE VISIT (OUTPATIENT)
Dept: HOME HOSPICE | Facility: HOSPICE | Age: 69
End: 2024-07-28
Payer: MEDICARE

## 2024-07-28 VITALS
RESPIRATION RATE: 16 BRPM | SYSTOLIC BLOOD PRESSURE: 122 MMHG | HEART RATE: 60 BPM | DIASTOLIC BLOOD PRESSURE: 88 MMHG | TEMPERATURE: 97.4 F

## 2024-07-28 PROCEDURE — G0299 HHS/HOSPICE OF RN EA 15 MIN: HCPCS

## 2024-07-29 ENCOUNTER — HOME CARE VISIT (OUTPATIENT)
Dept: HOME HOSPICE | Facility: HOSPICE | Age: 69
End: 2024-07-29
Payer: MEDICARE

## 2024-07-29 ENCOUNTER — HOME CARE VISIT (OUTPATIENT)
Dept: HOME HEALTH SERVICES | Facility: HOME HEALTHCARE | Age: 69
End: 2024-07-29
Payer: MEDICARE

## 2024-07-29 PROCEDURE — G0156 HHCP-SVS OF AIDE,EA 15 MIN: HCPCS

## 2024-07-29 PROCEDURE — G0155 HHCP-SVS OF CSW,EA 15 MIN: HCPCS

## 2024-07-30 ENCOUNTER — HOME CARE VISIT (OUTPATIENT)
Dept: HOME HEALTH SERVICES | Facility: HOME HEALTHCARE | Age: 69
End: 2024-07-30
Payer: MEDICARE

## 2024-07-30 PROCEDURE — G0299 HHS/HOSPICE OF RN EA 15 MIN: HCPCS

## 2024-07-30 PROCEDURE — G0156 HHCP-SVS OF AIDE,EA 15 MIN: HCPCS

## 2024-07-31 ENCOUNTER — HOME CARE VISIT (OUTPATIENT)
Dept: HOME HEALTH SERVICES | Facility: HOME HEALTHCARE | Age: 69
End: 2024-07-31
Payer: MEDICARE

## 2024-07-31 PROCEDURE — G0156 HHCP-SVS OF AIDE,EA 15 MIN: HCPCS

## 2024-08-01 ENCOUNTER — HOME CARE VISIT (OUTPATIENT)
Dept: HOME HEALTH SERVICES | Facility: HOME HEALTHCARE | Age: 69
End: 2024-08-01
Payer: MEDICARE

## 2024-08-01 PROCEDURE — G0156 HHCP-SVS OF AIDE,EA 15 MIN: HCPCS

## 2024-08-02 ENCOUNTER — HOME CARE VISIT (OUTPATIENT)
Dept: HOME HEALTH SERVICES | Facility: HOME HEALTHCARE | Age: 69
End: 2024-08-02
Payer: MEDICARE

## 2024-08-02 PROCEDURE — G0156 HHCP-SVS OF AIDE,EA 15 MIN: HCPCS

## 2024-08-04 ENCOUNTER — HOME CARE VISIT (OUTPATIENT)
Dept: HOME HEALTH SERVICES | Facility: HOME HEALTHCARE | Age: 69
End: 2024-08-04
Payer: MEDICARE

## 2024-08-04 PROCEDURE — G0299 HHS/HOSPICE OF RN EA 15 MIN: HCPCS

## 2024-08-05 ENCOUNTER — HOME CARE VISIT (OUTPATIENT)
Dept: HOME HEALTH SERVICES | Facility: HOME HEALTHCARE | Age: 69
End: 2024-08-05
Payer: MEDICARE

## 2024-08-05 PROCEDURE — G0156 HHCP-SVS OF AIDE,EA 15 MIN: HCPCS

## 2024-08-06 ENCOUNTER — HOME CARE VISIT (OUTPATIENT)
Dept: HOME HEALTH SERVICES | Facility: HOME HEALTHCARE | Age: 69
End: 2024-08-06
Payer: MEDICARE

## 2024-08-06 PROCEDURE — G0299 HHS/HOSPICE OF RN EA 15 MIN: HCPCS

## 2024-08-06 PROCEDURE — G0156 HHCP-SVS OF AIDE,EA 15 MIN: HCPCS

## 2024-08-07 ENCOUNTER — HOME CARE VISIT (OUTPATIENT)
Dept: HOME HEALTH SERVICES | Facility: HOME HEALTHCARE | Age: 69
End: 2024-08-07
Payer: MEDICARE

## 2024-08-07 PROCEDURE — G0156 HHCP-SVS OF AIDE,EA 15 MIN: HCPCS

## 2024-08-08 ENCOUNTER — HOME CARE VISIT (OUTPATIENT)
Dept: HOME HEALTH SERVICES | Facility: HOME HEALTHCARE | Age: 69
End: 2024-08-08
Payer: MEDICARE

## 2024-08-08 PROCEDURE — G0156 HHCP-SVS OF AIDE,EA 15 MIN: HCPCS

## 2024-08-11 ENCOUNTER — HOME CARE VISIT (OUTPATIENT)
Dept: HOME HEALTH SERVICES | Facility: HOME HEALTHCARE | Age: 69
End: 2024-08-11
Payer: MEDICARE

## 2024-08-11 ENCOUNTER — HOME CARE VISIT (OUTPATIENT)
Dept: HOME HOSPICE | Facility: HOSPICE | Age: 69
End: 2024-08-11
Payer: MEDICARE

## 2024-08-11 PROCEDURE — G0299 HHS/HOSPICE OF RN EA 15 MIN: HCPCS

## 2024-08-13 ENCOUNTER — HOME CARE VISIT (OUTPATIENT)
Dept: HOME HEALTH SERVICES | Facility: HOME HEALTHCARE | Age: 69
End: 2024-08-13
Payer: MEDICARE

## 2024-08-13 PROCEDURE — G0156 HHCP-SVS OF AIDE,EA 15 MIN: HCPCS

## 2024-08-13 PROCEDURE — G0299 HHS/HOSPICE OF RN EA 15 MIN: HCPCS

## 2024-08-14 ENCOUNTER — HOME CARE VISIT (OUTPATIENT)
Dept: HOME HEALTH SERVICES | Facility: HOME HEALTHCARE | Age: 69
End: 2024-08-14
Payer: MEDICARE

## 2024-08-14 PROCEDURE — G0156 HHCP-SVS OF AIDE,EA 15 MIN: HCPCS

## 2024-08-15 ENCOUNTER — HOME CARE VISIT (OUTPATIENT)
Dept: HOME HEALTH SERVICES | Facility: HOME HEALTHCARE | Age: 69
End: 2024-08-15
Payer: MEDICARE

## 2024-08-15 PROCEDURE — G0156 HHCP-SVS OF AIDE,EA 15 MIN: HCPCS

## 2024-08-16 ENCOUNTER — HOME CARE VISIT (OUTPATIENT)
Dept: HOME HEALTH SERVICES | Facility: HOME HEALTHCARE | Age: 69
End: 2024-08-16
Payer: MEDICARE

## 2024-08-16 ENCOUNTER — HOME CARE VISIT (OUTPATIENT)
Dept: HOME HOSPICE | Facility: HOSPICE | Age: 69
End: 2024-08-16
Payer: MEDICARE

## 2024-08-16 PROCEDURE — G0156 HHCP-SVS OF AIDE,EA 15 MIN: HCPCS

## 2024-08-19 ENCOUNTER — HOME CARE VISIT (OUTPATIENT)
Dept: HOME HEALTH SERVICES | Facility: HOME HEALTHCARE | Age: 69
End: 2024-08-19
Payer: MEDICARE

## 2024-08-19 PROCEDURE — G0156 HHCP-SVS OF AIDE,EA 15 MIN: HCPCS

## 2024-08-20 ENCOUNTER — HOME CARE VISIT (OUTPATIENT)
Dept: HOME HEALTH SERVICES | Facility: HOME HEALTHCARE | Age: 69
End: 2024-08-20
Payer: MEDICARE

## 2024-08-20 VITALS — DIASTOLIC BLOOD PRESSURE: 50 MMHG | SYSTOLIC BLOOD PRESSURE: 100 MMHG | RESPIRATION RATE: 16 BRPM | HEART RATE: 80 BPM

## 2024-08-20 PROCEDURE — G0156 HHCP-SVS OF AIDE,EA 15 MIN: HCPCS

## 2024-08-20 PROCEDURE — G0299 HHS/HOSPICE OF RN EA 15 MIN: HCPCS

## 2024-08-21 ENCOUNTER — HOME CARE VISIT (OUTPATIENT)
Dept: HOME HEALTH SERVICES | Facility: HOME HEALTHCARE | Age: 69
End: 2024-08-21
Payer: MEDICARE

## 2024-08-21 ENCOUNTER — HOME CARE VISIT (OUTPATIENT)
Dept: HOME HOSPICE | Facility: HOSPICE | Age: 69
End: 2024-08-21
Payer: MEDICARE

## 2024-08-21 PROCEDURE — G0156 HHCP-SVS OF AIDE,EA 15 MIN: HCPCS

## 2024-08-22 ENCOUNTER — HOME CARE VISIT (OUTPATIENT)
Dept: HOME HOSPICE | Facility: HOSPICE | Age: 69
End: 2024-08-22
Payer: MEDICARE

## 2024-08-22 ENCOUNTER — HOME CARE VISIT (OUTPATIENT)
Dept: HOME HEALTH SERVICES | Facility: HOME HEALTHCARE | Age: 69
End: 2024-08-22
Payer: MEDICARE

## 2024-08-22 PROCEDURE — G0299 HHS/HOSPICE OF RN EA 15 MIN: HCPCS

## 2024-08-22 PROCEDURE — G0156 HHCP-SVS OF AIDE,EA 15 MIN: HCPCS

## 2024-08-22 PROCEDURE — G0155 HHCP-SVS OF CSW,EA 15 MIN: HCPCS

## 2024-08-23 ENCOUNTER — HOME CARE VISIT (OUTPATIENT)
Dept: HOME HEALTH SERVICES | Facility: HOME HEALTHCARE | Age: 69
End: 2024-08-23
Payer: MEDICARE

## 2024-08-23 PROCEDURE — G0156 HHCP-SVS OF AIDE,EA 15 MIN: HCPCS

## 2024-08-24 ENCOUNTER — HOME CARE VISIT (OUTPATIENT)
Dept: HOME HEALTH SERVICES | Facility: HOME HEALTHCARE | Age: 69
End: 2024-08-24
Payer: MEDICARE

## 2024-08-24 PROCEDURE — G0156 HHCP-SVS OF AIDE,EA 15 MIN: HCPCS

## 2024-08-25 ENCOUNTER — HOME CARE VISIT (OUTPATIENT)
Dept: HOME HEALTH SERVICES | Facility: HOME HEALTHCARE | Age: 69
End: 2024-08-25
Payer: MEDICARE

## 2024-08-25 PROCEDURE — G0156 HHCP-SVS OF AIDE,EA 15 MIN: HCPCS

## 2024-08-26 ENCOUNTER — HOME CARE VISIT (OUTPATIENT)
Dept: HOME HEALTH SERVICES | Facility: HOME HEALTHCARE | Age: 69
End: 2024-08-26
Payer: MEDICARE

## 2024-08-26 PROCEDURE — G0156 HHCP-SVS OF AIDE,EA 15 MIN: HCPCS

## 2024-08-27 ENCOUNTER — HOME CARE VISIT (OUTPATIENT)
Dept: HOME HOSPICE | Facility: HOSPICE | Age: 69
End: 2024-08-27
Payer: MEDICARE

## 2024-08-27 ENCOUNTER — HOME CARE VISIT (OUTPATIENT)
Dept: HOME HEALTH SERVICES | Facility: HOME HEALTHCARE | Age: 69
End: 2024-08-27
Payer: MEDICARE

## 2024-08-27 VITALS — HEART RATE: 72 BPM | RESPIRATION RATE: 16 BRPM | DIASTOLIC BLOOD PRESSURE: 90 MMHG | SYSTOLIC BLOOD PRESSURE: 130 MMHG

## 2024-08-27 PROCEDURE — G0156 HHCP-SVS OF AIDE,EA 15 MIN: HCPCS

## 2024-08-27 PROCEDURE — G0299 HHS/HOSPICE OF RN EA 15 MIN: HCPCS

## 2024-08-29 ENCOUNTER — HOME CARE VISIT (OUTPATIENT)
Dept: HOME HEALTH SERVICES | Facility: HOME HEALTHCARE | Age: 69
End: 2024-08-29
Payer: MEDICARE

## 2024-08-29 PROCEDURE — G0156 HHCP-SVS OF AIDE,EA 15 MIN: HCPCS

## 2024-08-30 ENCOUNTER — HOME CARE VISIT (OUTPATIENT)
Dept: HOME HEALTH SERVICES | Facility: HOME HEALTHCARE | Age: 69
End: 2024-08-30
Payer: MEDICARE

## 2024-08-30 PROCEDURE — G0299 HHS/HOSPICE OF RN EA 15 MIN: HCPCS

## 2024-08-30 PROCEDURE — G0156 HHCP-SVS OF AIDE,EA 15 MIN: HCPCS

## 2024-09-02 ENCOUNTER — HOME CARE VISIT (OUTPATIENT)
Dept: HOME HEALTH SERVICES | Facility: HOME HEALTHCARE | Age: 69
End: 2024-09-02
Payer: MEDICARE

## 2024-09-02 PROCEDURE — G0156 HHCP-SVS OF AIDE,EA 15 MIN: HCPCS

## 2024-09-03 ENCOUNTER — HOME CARE VISIT (OUTPATIENT)
Dept: HOME HEALTH SERVICES | Facility: HOME HEALTHCARE | Age: 69
End: 2024-09-03
Payer: MEDICARE

## 2024-09-03 VITALS — SYSTOLIC BLOOD PRESSURE: 112 MMHG | HEART RATE: 72 BPM | DIASTOLIC BLOOD PRESSURE: 60 MMHG | RESPIRATION RATE: 16 BRPM

## 2024-09-03 PROCEDURE — G0299 HHS/HOSPICE OF RN EA 15 MIN: HCPCS

## 2024-09-03 PROCEDURE — G0156 HHCP-SVS OF AIDE,EA 15 MIN: HCPCS

## 2024-09-04 ENCOUNTER — HOME CARE VISIT (OUTPATIENT)
Dept: HOME HEALTH SERVICES | Facility: HOME HEALTHCARE | Age: 69
End: 2024-09-04
Payer: MEDICARE

## 2024-09-04 PROCEDURE — G0156 HHCP-SVS OF AIDE,EA 15 MIN: HCPCS

## 2024-09-05 ENCOUNTER — HOME CARE VISIT (OUTPATIENT)
Dept: HOME HEALTH SERVICES | Facility: HOME HEALTHCARE | Age: 69
End: 2024-09-05
Payer: MEDICARE

## 2024-09-05 PROCEDURE — G0156 HHCP-SVS OF AIDE,EA 15 MIN: HCPCS

## 2024-09-06 ENCOUNTER — HOME CARE VISIT (OUTPATIENT)
Dept: HOME HOSPICE | Facility: HOSPICE | Age: 69
End: 2024-09-06
Payer: MEDICARE

## 2024-09-06 ENCOUNTER — HOME CARE VISIT (OUTPATIENT)
Dept: HOME HEALTH SERVICES | Facility: HOME HEALTHCARE | Age: 69
End: 2024-09-06
Payer: MEDICARE

## 2024-09-06 VITALS — RESPIRATION RATE: 22 BRPM | SYSTOLIC BLOOD PRESSURE: 110 MMHG | DIASTOLIC BLOOD PRESSURE: 52 MMHG | HEART RATE: 78 BPM

## 2024-09-06 PROCEDURE — G0156 HHCP-SVS OF AIDE,EA 15 MIN: HCPCS

## 2024-09-06 PROCEDURE — G0299 HHS/HOSPICE OF RN EA 15 MIN: HCPCS

## 2024-09-07 ENCOUNTER — HOME CARE VISIT (OUTPATIENT)
Dept: HOME HEALTH SERVICES | Facility: HOME HEALTHCARE | Age: 69
End: 2024-09-07
Payer: MEDICARE

## 2024-09-07 PROCEDURE — G0156 HHCP-SVS OF AIDE,EA 15 MIN: HCPCS

## 2024-09-08 ENCOUNTER — HOME CARE VISIT (OUTPATIENT)
Dept: HOME HEALTH SERVICES | Facility: HOME HEALTHCARE | Age: 69
End: 2024-09-08
Payer: MEDICARE

## 2024-09-08 PROCEDURE — G0156 HHCP-SVS OF AIDE,EA 15 MIN: HCPCS

## 2024-09-09 ENCOUNTER — TELEPHONE (OUTPATIENT)
Dept: HOME HEALTH SERVICES | Facility: HOME HEALTHCARE | Age: 69
End: 2024-09-09

## 2024-09-09 ENCOUNTER — HOME CARE VISIT (OUTPATIENT)
Dept: HOME HOSPICE | Facility: HOSPICE | Age: 69
End: 2024-09-09
Payer: MEDICARE

## 2024-09-10 ENCOUNTER — HOME CARE VISIT (OUTPATIENT)
Dept: HOME HEALTH SERVICES | Facility: HOME HEALTHCARE | Age: 69
End: 2024-09-10
Payer: MEDICARE

## 2024-09-10 VITALS — DIASTOLIC BLOOD PRESSURE: 68 MMHG | SYSTOLIC BLOOD PRESSURE: 102 MMHG | RESPIRATION RATE: 16 BRPM | HEART RATE: 82 BPM

## 2024-09-10 PROCEDURE — G0156 HHCP-SVS OF AIDE,EA 15 MIN: HCPCS

## 2024-09-10 PROCEDURE — G0299 HHS/HOSPICE OF RN EA 15 MIN: HCPCS

## 2024-09-11 ENCOUNTER — HOME CARE VISIT (OUTPATIENT)
Dept: HOME HEALTH SERVICES | Facility: HOME HEALTHCARE | Age: 69
End: 2024-09-11
Payer: MEDICARE

## 2024-09-11 PROCEDURE — G0156 HHCP-SVS OF AIDE,EA 15 MIN: HCPCS

## 2024-09-12 ENCOUNTER — HOME CARE VISIT (OUTPATIENT)
Dept: HOME HEALTH SERVICES | Facility: HOME HEALTHCARE | Age: 69
End: 2024-09-12
Payer: MEDICARE

## 2024-09-12 ENCOUNTER — HOME CARE VISIT (OUTPATIENT)
Dept: HOME HOSPICE | Facility: HOSPICE | Age: 69
End: 2024-09-12
Payer: MEDICARE

## 2024-09-12 PROCEDURE — G0156 HHCP-SVS OF AIDE,EA 15 MIN: HCPCS

## 2024-09-12 PROCEDURE — G0299 HHS/HOSPICE OF RN EA 15 MIN: HCPCS

## 2024-09-13 ENCOUNTER — HOME CARE VISIT (OUTPATIENT)
Dept: HOME HEALTH SERVICES | Facility: HOME HEALTHCARE | Age: 69
End: 2024-09-13
Payer: MEDICARE

## 2024-09-13 PROCEDURE — G0156 HHCP-SVS OF AIDE,EA 15 MIN: HCPCS

## 2024-09-16 ENCOUNTER — HOME CARE VISIT (OUTPATIENT)
Dept: HOME HEALTH SERVICES | Facility: HOME HEALTHCARE | Age: 69
End: 2024-09-16
Payer: MEDICARE

## 2024-09-16 PROCEDURE — G0156 HHCP-SVS OF AIDE,EA 15 MIN: HCPCS

## 2024-09-17 ENCOUNTER — HOME CARE VISIT (OUTPATIENT)
Dept: HOME HEALTH SERVICES | Facility: HOME HEALTHCARE | Age: 69
End: 2024-09-17
Payer: MEDICARE

## 2024-09-17 ENCOUNTER — TELEPHONE (OUTPATIENT)
Dept: HOME HEALTH SERVICES | Facility: HOME HEALTHCARE | Age: 69
End: 2024-09-17

## 2024-09-17 ENCOUNTER — HOME CARE VISIT (OUTPATIENT)
Dept: HOME HOSPICE | Facility: HOSPICE | Age: 69
End: 2024-09-17
Payer: MEDICARE

## 2024-09-17 VITALS — DIASTOLIC BLOOD PRESSURE: 80 MMHG | RESPIRATION RATE: 16 BRPM | SYSTOLIC BLOOD PRESSURE: 160 MMHG | HEART RATE: 88 BPM

## 2024-09-17 PROCEDURE — G0299 HHS/HOSPICE OF RN EA 15 MIN: HCPCS

## 2024-09-17 PROCEDURE — G0155 HHCP-SVS OF CSW,EA 15 MIN: HCPCS

## 2024-09-17 PROCEDURE — G0156 HHCP-SVS OF AIDE,EA 15 MIN: HCPCS

## 2024-09-18 ENCOUNTER — HOME CARE VISIT (OUTPATIENT)
Dept: HOME HOSPICE | Facility: HOSPICE | Age: 69
End: 2024-09-18
Payer: MEDICARE

## 2024-09-18 ENCOUNTER — HOME CARE VISIT (OUTPATIENT)
Dept: HOME HEALTH SERVICES | Facility: HOME HEALTHCARE | Age: 69
End: 2024-09-18
Payer: MEDICARE

## 2024-09-18 PROCEDURE — G0156 HHCP-SVS OF AIDE,EA 15 MIN: HCPCS

## 2024-09-19 ENCOUNTER — HOME CARE VISIT (OUTPATIENT)
Dept: HOME HEALTH SERVICES | Facility: HOME HEALTHCARE | Age: 69
End: 2024-09-19
Payer: MEDICARE

## 2024-09-19 ENCOUNTER — TELEPHONE (OUTPATIENT)
Dept: HOME HEALTH SERVICES | Facility: HOME HEALTHCARE | Age: 69
End: 2024-09-19

## 2024-09-19 VITALS
RESPIRATION RATE: 21 BRPM | DIASTOLIC BLOOD PRESSURE: 72 MMHG | SYSTOLIC BLOOD PRESSURE: 122 MMHG | OXYGEN SATURATION: 98 % | HEART RATE: 82 BPM | TEMPERATURE: 98 F

## 2024-09-19 PROCEDURE — G0300 HHS/HOSPICE OF LPN EA 15 MIN: HCPCS

## 2024-09-19 PROCEDURE — G0156 HHCP-SVS OF AIDE,EA 15 MIN: HCPCS

## 2024-09-20 ENCOUNTER — HOME CARE VISIT (OUTPATIENT)
Dept: HOME HEALTH SERVICES | Facility: HOME HEALTHCARE | Age: 69
End: 2024-09-20
Payer: MEDICARE

## 2024-09-20 PROCEDURE — G0156 HHCP-SVS OF AIDE,EA 15 MIN: HCPCS

## 2024-09-20 PROCEDURE — G0299 HHS/HOSPICE OF RN EA 15 MIN: HCPCS

## 2024-09-21 ENCOUNTER — HOME CARE VISIT (OUTPATIENT)
Dept: HOME HOSPICE | Facility: HOSPICE | Age: 69
End: 2024-09-21
Payer: MEDICARE

## 2024-09-21 ENCOUNTER — HOME CARE VISIT (OUTPATIENT)
Dept: HOME HEALTH SERVICES | Facility: HOME HEALTHCARE | Age: 69
End: 2024-09-21
Payer: MEDICARE

## 2024-09-21 PROCEDURE — G0156 HHCP-SVS OF AIDE,EA 15 MIN: HCPCS

## 2024-09-22 ENCOUNTER — HOME CARE VISIT (OUTPATIENT)
Dept: HOME HEALTH SERVICES | Facility: HOME HEALTHCARE | Age: 69
End: 2024-09-22
Payer: MEDICARE

## 2024-09-22 PROCEDURE — G0156 HHCP-SVS OF AIDE,EA 15 MIN: HCPCS

## 2024-09-23 ENCOUNTER — HOME CARE VISIT (OUTPATIENT)
Dept: HOME HEALTH SERVICES | Facility: HOME HEALTHCARE | Age: 69
End: 2024-09-23
Payer: MEDICARE

## 2024-09-23 ENCOUNTER — TELEPHONE (OUTPATIENT)
Dept: HOME HEALTH SERVICES | Facility: HOME HEALTHCARE | Age: 69
End: 2024-09-23

## 2024-09-23 ENCOUNTER — HOME CARE VISIT (OUTPATIENT)
Dept: HOME HOSPICE | Facility: HOSPICE | Age: 69
End: 2024-09-23
Payer: MEDICARE

## 2024-09-23 VITALS — SYSTOLIC BLOOD PRESSURE: 122 MMHG | RESPIRATION RATE: 16 BRPM | HEART RATE: 72 BPM | DIASTOLIC BLOOD PRESSURE: 60 MMHG

## 2024-09-23 PROCEDURE — G0299 HHS/HOSPICE OF RN EA 15 MIN: HCPCS

## 2024-09-23 PROCEDURE — G0155 HHCP-SVS OF CSW,EA 15 MIN: HCPCS

## 2024-09-23 PROCEDURE — G0156 HHCP-SVS OF AIDE,EA 15 MIN: HCPCS

## 2024-09-24 ENCOUNTER — HOME CARE VISIT (OUTPATIENT)
Dept: HOME HEALTH SERVICES | Facility: HOME HEALTHCARE | Age: 69
End: 2024-09-24
Payer: MEDICARE

## 2024-09-24 PROCEDURE — G0156 HHCP-SVS OF AIDE,EA 15 MIN: HCPCS

## 2024-09-25 ENCOUNTER — HOME CARE VISIT (OUTPATIENT)
Dept: HOME HEALTH SERVICES | Facility: HOME HEALTHCARE | Age: 69
End: 2024-09-25
Payer: MEDICARE

## 2024-09-25 ENCOUNTER — HOME CARE VISIT (OUTPATIENT)
Dept: HOME HOSPICE | Facility: HOSPICE | Age: 69
End: 2024-09-25
Payer: MEDICARE

## 2024-09-25 PROCEDURE — G0299 HHS/HOSPICE OF RN EA 15 MIN: HCPCS

## 2024-09-25 PROCEDURE — G0156 HHCP-SVS OF AIDE,EA 15 MIN: HCPCS

## 2024-09-26 ENCOUNTER — HOME CARE VISIT (OUTPATIENT)
Dept: HOME HOSPICE | Facility: HOSPICE | Age: 69
End: 2024-09-26
Payer: MEDICARE

## 2024-09-26 ENCOUNTER — APPOINTMENT (EMERGENCY)
Dept: RADIOLOGY | Facility: HOSPITAL | Age: 69
End: 2024-09-26
Payer: MEDICARE

## 2024-09-26 ENCOUNTER — HOSPITAL ENCOUNTER (EMERGENCY)
Facility: HOSPITAL | Age: 69
Discharge: HOME | End: 2024-09-26
Attending: EMERGENCY MEDICINE
Payer: MEDICARE

## 2024-09-26 ENCOUNTER — HOME CARE VISIT (OUTPATIENT)
Dept: HOME HEALTH SERVICES | Facility: HOME HEALTHCARE | Age: 69
End: 2024-09-26
Payer: MEDICARE

## 2024-09-26 VITALS
DIASTOLIC BLOOD PRESSURE: 89 MMHG | OXYGEN SATURATION: 95 % | SYSTOLIC BLOOD PRESSURE: 143 MMHG | RESPIRATION RATE: 16 BRPM | TEMPERATURE: 98.6 F | HEART RATE: 91 BPM

## 2024-09-26 DIAGNOSIS — Z51.5 HOSPICE CARE: Primary | ICD-10-CM
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
## 2024-09-26 PROCEDURE — 73564 X-RAY EXAM KNEE 4 OR MORE: CPT

## 2024-09-26 PROCEDURE — 99284 EMERGENCY DEPT VISIT MOD MDM: CPT

## 2024-09-26 PROCEDURE — 70450 CT HEAD/BRAIN W/O DYE: CPT

## 2024-09-26 PROCEDURE — G0156 HHCP-SVS OF AIDE,EA 15 MIN: HCPCS

## 2024-09-26 PROCEDURE — 99284 EMERGENCY DEPT VISIT MOD MDM: CPT | Performed by: EMERGENCY MEDICINE

## 2024-09-26 NOTE — ED ATTENDING ATTESTATION
9/26/2024  I, Azam Restrepo MD, saw and evaluated the patient. I have discussed the patient with the resident/non-physician practitioner and agree with the resident's/non-physician practitioner's findings, Plan of Care, and MDM as documented in the resident's/non-physician practitioner's note, except where noted. All available labs and Radiology studies were reviewed.  I was present for key portions of any procedure(s) performed by the resident/non-physician practitioner and I was immediately available to provide assistance.       At this point I agree with the current assessment done in the Emergency Department.  I have conducted an independent evaluation of this patient a history and physical is as follows:  H/o parkinsons dz  non ambulatory at baseline  Fell out of bed   at 4 pm  states onto knees  wheelchair and walker   uses a hoya lift   No head strike no anticoagulation   Pt has severe parkinsons dz on hospice   Alert  gcs 15  masked facies atraumatic  neck nt    Lungs clear heart rrr   abd soft nt pelvis stable   Mild tenderness ant right knee no skin break able to lift both legs off the strecher  pat quad intact   has b/l leg weakness  cog wheel rigidity   Plan xray knee b/l    ED Course         Critical Care Time  Procedures

## 2024-09-26 NOTE — ED PROVIDER NOTES
Final diagnoses:   Hospice care   Fall, initial encounter     ED Disposition       ED Disposition   Discharge    Condition   Stable    Date/Time   Thu Sep 26, 2024  7:02 PM    Comment   Lit Tripp discharge to home/self care.                   Assessment & Plan       Medical Decision Making  Amount and/or Complexity of Data Reviewed  Radiology: ordered.    Pt is a 69-year-old male, hospice patient, sent to the ED for a fall.  Patient is on hospice for advanced Parkinson's.  Unclear history, neurologically at baseline.  States that he fell onto his knees.  Will get CT head and bilateral knee x-rays.    Initial presentation pt is baseline mentation, dysarthria this is baseline secondary to Parkinson's.    On exam   General: Baseline  Speaking normally in full sentences.   Head: Normocephalic, atraumatic, nontender.  Eyes: PERRL, EOM-I. No diplopia.   No hyphema.   No subconjunctival hemorrhages.  Symmetrical lids.   ENT: Atraumatic external nose and ears.    MMM  No malocclusion. No stridor. Normal phonation. No drooling. Normal swallowing.   Neck: Symmetric, trachea midline. No JVD.  CV: RRR. +S1/S2  No murmurs or gallops  Peripheral pulses +2 throughout. No chest wall tenderness.   Lungs:   Unlabored No retractions  CTAB, lungs sounds equal bilateral.   No tachypnea.   Abd: +BS, soft, NT/ND.   MSK:   FROM no external signs of trauma to the bilateral knees  Back:   No rashes  Skin: Dry, intact.   Neuro: AAOx3, GCS 15, CN II-XII grossly intact.   Motor grossly intact.  Shaking which is his baseline  Psychiatric/Behavioral: Appropriate mood and affect   Exam: deferred      Plan: Patient is hospice here for further evaluation.  Will get bilateral knee x-rays and CT head given unclear history.  Patient was discharged back to home hospice after workup.      Final Dispo:     Pt is hemodynamically stable and clear for discharge with outpatient f/u with their PCP.   ED Course as of 09/28/24 1331   Thu Sep 26, 2024   [id number]  69-year-old male, hospice patient, sent to the ED for a fall.  Patient is on hospice for advanced Parkinson's.  Unclear history, neurologically at baseline.  States that he fell onto his knees.  Will get CT head and bilateral knee x-rays.       Medications - No data to display    ED Risk Strat Scores                   Identification of Seniors at Risk      Flowsheet Row Most Recent Value   (ISAR) Identification of Seniors at Risk    Before the illness or injury that brought you to the Emergency, did you need someone to help you on a regular basis? 1 Filed at: 09/26/2024 1702   In the last 24 hours, have you needed more help than usual? 1 Filed at: 09/26/2024 1702   Have you been hospitalized for one or more nights during the past 6 months? 1 Filed at: 09/26/2024 1702   In general, do you see well? 0 Filed at: 09/26/2024 1702   In general, do you have serious problems with your memory? 0 Filed at: 09/26/2024 1702   Do you take more than three different medications every day? 1 Filed at: 09/26/2024 1702   ISAR Score 4 Filed at: 09/26/2024 1702                                    History of Present Illness       Chief Complaint   Patient presents with    Fall     Pt rolled out of hospital bed at home today, hospice patient, unknown headstrike, -thinners, unknown LOC       Past Medical History:   Diagnosis Date    Arthritis     Complicated UTI (urinary tract infection) 04/02/2024    COVID-19 01/09/2024    Hypertension     Hyponatremia 01/05/2024    Incomplete emptying of bladder 01/09/2024    Nail dystrophy 01/20/2021    Obesity 06/17/2024    Onychomycosis 05/09/2017    Parkinson's disease     2 years ago    Psoriasis       Past Surgical History:   Procedure Laterality Date    APPENDECTOMY  1966    CHOLECYSTECTOMY      COLONOSCOPY      TESTICLE BIOPSY  1982      Family History   Problem Relation Age of Onset    Parkinsonism Mother         osnet in late 70s, lived into 90s.     Prostate cancer Father     Psoriasis Father      Diabetes Maternal Grandmother     Stroke Paternal Grandmother     Diabetes Paternal Grandfather     Parkinsonism Cousin         1st cousin       Social History     Tobacco Use    Smoking status: Former     Current packs/day: 0.00     Types: Cigarettes     Quit date:      Years since quittin.7    Smokeless tobacco: Never   Vaping Use    Vaping status: Never Used   Substance Use Topics    Alcohol use: No     Comment: quit     Drug use: No      E-Cigarette/Vaping    E-Cigarette Use Never User       E-Cigarette/Vaping Substances    Nicotine No     THC No     CBD No     Flavoring No     Other No     Unknown No       I have reviewed and agree with the history as documented.     HPI    Review of Systems        Objective       ED Triage Vitals   Temperature Pulse Blood Pressure Respirations SpO2 Patient Position - Orthostatic VS   24 1704 24 1701 24 1701 24 1701 24 1701 24 170   98.6 °F (37 °C) 91 143/89 16 95 % Sitting      Temp Source Heart Rate Source BP Location FiO2 (%) Pain Score    24 1704 24 1701 24 170 -- --    Tympanic Monitor Left arm        Vitals      Date and Time Temp Pulse SpO2 Resp BP Pain Score FACES Pain Rating User   24 170 98.6 °F (37 °C) -- -- -- -- -- -- MT   24 -- 91 95 % 16 143/89 -- -- MT            Physical Exam    Results Reviewed       None            XR knee 4+ vw left injury   Final Interpretation by Narinder Sharma MD ([id number])      No acute osseous abnormality.         Computerized Assisted Algorithm (CAA) may have been used to analyze all applicable images.         Workstation performed: [id number]         XR knee 4+ vw right injury   Final Interpretation by Narinder Sharma MD ([id number])      No acute osseous abnormality.      Degenerative changes as described.         Computerized Assisted Algorithm (CAA) may have been used to analyze all applicable images.          Workstation performed: [id number]         CT head wo contrast   Final Interpretation by Frankie Johnson MD (09/26 1856)      No acute intracranial abnormality.                  Workstation performed: [id number]             Procedures    ED Medication and Procedure Management   Prior to Admission Medications   Prescriptions Last Dose Informant Patient Reported? Taking?   LORazepam (ATIVAN) 0.5 mg tablet   Yes No   Sig: Take 0.5 mg by mouth every 6 (six) hours as needed for anxiety. Indications: Feeling Anxious   LORazepam (ATIVAN) 1 mg tablet   Yes No   Sig: Take 1 mg by mouth daily at bedtime.   Morphine Sulfate, Concentrate, 20 mg/mL concentrated solution   Yes No   Sig: Take 5 mg by mouth every 3 (three) hours as needed for moderate pain or severe pain (OR DYSPNEA). CP MED ON HOLD  Indications: Difficulty Breathing, Pain   QUEtiapine (SEROquel) 25 mg tablet   Yes No   Sig: Take 12.5 mg by mouth daily at bedtime.   acetaminophen (TYLENOL) 325 mg tablet   Yes No   Sig: Take 650 mg by mouth every 6 (six) hours as needed for fever or mild pain. do not exceed 3000mg/day  Indications: Fever, Pain   acetaminophen (TYLENOL) 650 mg suppository   Yes No   Sig: Insert 650 mg into the rectum every 8 (eight) hours as needed for fever or mild pain. CP MED ON HOLD  Indications: Fever, Pain   bisacodyl (DULCOLAX) 10 mg suppository   Yes No   Sig: Insert 10 mg into the rectum daily as needed for constipation. Indications: Constipation   carbidopa-levodopa (SINEMET)  mg per tablet   Yes No   Sig: Take 2 tablets by mouth 3 (three) times a day. ALSO TAKE 1 TAB AT BEDTIME  Indications: Parkinson's Disease   citalopram (CeleXA) 10 mg tablet   Yes No   Sig: Take 20 mg by mouth daily.   entacapone (COMTAN) 200 mg tablet   Yes No   Sig: Take 200 mg by mouth 4 (four) times a day. Indications: Parkinson's Disease with Unknown Cause   hyoscyamine (LEVSIN/SL) 0.125 mg SL tablet   Yes No   Sig: Take 0.125 mg by mouth every 4 (four)  hours as needed (SECRETIONS). Indications: SECRETIONS   lactulose (CHRONULAC) 10 g/15 mL solution   Yes No   Sig: Take 30 g by mouth 2 (two) times a day. Indications: Constipation   lidocaine (XYLOCAINE) 5 % ointment   Yes No   Sig: Apply 1 Application topically 3 (three) times a day as needed (pain). Indications: pain   nystatin (MYCOSTATIN) powder   Yes No   Sig: Apply 1 Application topically 2 (two) times a day. Indications: rash   oxyCODONE (ROXICODONE) 5 immediate release tablet   Yes No   Sig: Take 5 mg by mouth every 4 (four) hours as needed for moderate pain or severe pain (or dyspnea). Indications: Acute Pain, Chronic Pain, or dyspnea   oxygen gas   Yes No   Sig: Inhale 2 L/min as needed (dyspnea). Indications: dyspnea   polyethylene glycol (GLYCOLAX) 17 GM/SCOOP powder   Yes No   Sig: Take 17 g by mouth daily. Indications: Constipation   predniSONE 20 mg tablet   Yes No   Sig: Take 20 mg by mouth daily. Indications: orthostatic hypotension   prochlorperazine (COMPAZINE) 10 mg tablet   Yes No   Sig: Take 10 mg by mouth every 6 (six) hours as needed for nausea or vomiting. CP MED ON HOLD  Indications: Nausea and Vomiting   senna-docusate sodium (Senna S) 8.6-50 mg per tablet   Yes No   Sig: Take 2 tablets by mouth daily. and one tablet in the pm.   testosterone cypionate (DEPO-TESTOSTERONE) 200 mg/mL SOLN  Self No No   Sig: INJECT 0.5ML IM ONCE WEEKLY.   Patient not taking: Reported on 3/22/2023   vardenafil (LEVITRA) 20 MG tablet  Self No No   Sig: Take 1 tablet (20 mg total) by mouth daily as needed for erectile dysfunction Take 1 tablet by mouth one (1) hour prior to intercourse. Limit to only 2 tablets per week.   Patient not taking: Reported on 3/22/2023   zinc oxide (Balmex Complete Protection) 11.3 % cream   Yes No   Sig: Apply 1 Application topically 2 (two) times a day. Indications: skin irritation      Facility-Administered Medications: None     Discharge Medication List as of 9/26/2024  7:02 PM         CONTINUE these medications which have NOT CHANGED    Details   acetaminophen (TYLENOL) 325 mg tablet Take 650 mg by mouth every 6 (six) hours as needed for fever or mild pain. do not exceed 3000mg/day  Indications: Fever, Pain, Starting Fri 5/24/2024, Historical Med      acetaminophen (TYLENOL) 650 mg suppository Insert 650 mg into the rectum every 8 (eight) hours as needed for fever or mild pain. CP MED ON HOLD  Indications: Fever, Pain, Historical Med      bisacodyl (DULCOLAX) 10 mg suppository Insert 10 mg into the rectum daily as needed for constipation. Indications: Constipation, Starting Sun 8/11/2024, Historical Med      carbidopa-levodopa (SINEMET)  mg per tablet Take 2 tablets by mouth 3 (three) times a day. ALSO TAKE 1 TAB AT BEDTIME  Indications: Parkinson's Disease, Starting Fri 5/24/2024, Historical Med      citalopram (CeleXA) 10 mg tablet Take 20 mg by mouth daily., Starting u 8/22/2024, Historical Med      entacapone (COMTAN) 200 mg tablet Take 200 mg by mouth 4 (four) times a day. Indications: Parkinson's Disease with Unknown Cause, Starting Fri 5/24/2024, Historical Med      hyoscyamine (LEVSIN/SL) 0.125 mg SL tablet Take 0.125 mg by mouth every 4 (four) hours as needed (SECRETIONS). Indications: SECRETIONS, Historical Med      lactulose (CHRONULAC) 10 g/15 mL solution Take 30 g by mouth 2 (two) times a day. Indications: Constipation, Starting Fri 8/16/2024, Historical Med      lidocaine (XYLOCAINE) 5 % ointment Apply 1 Application topically 3 (three) times a day as needed (pain). Indications: pain, Starting Wed 6/12/2024, Historical Med      !! LORazepam (ATIVAN) 0.5 mg tablet Take 0.5 mg by mouth every 6 (six) hours as needed for anxiety. Indications: Feeling Anxious, Starting Wed 7/3/2024, Historical Med      !! LORazepam (ATIVAN) 1 mg tablet Take 1 mg by mouth daily at bedtime., Starting Tue 9/10/2024, Historical Med      Morphine Sulfate, Concentrate, 20 mg/mL concentrated solution  Take 5 mg by mouth every 3 (three) hours as needed for moderate pain or severe pain (OR DYSPNEA). CP MED ON HOLD  Indications: Difficulty Breathing, Pain, Historical Med      nystatin (MYCOSTATIN) powder Apply 1 Application topically 2 (two) times a day. Indications: rash, Starting Fri 5/24/2024, Historical Med      oxyCODONE (ROXICODONE) 5 immediate release tablet Take 5 mg by mouth every 4 (four) hours as needed for moderate pain or severe pain (or dyspnea). Indications: Acute Pain, Chronic Pain, or dyspnea, Starting Fri 6/7/2024, Historical Med      oxygen gas Inhale 2 L/min as needed (dyspnea). Indications: dyspnea, Starting Fri 5/24/2024, Historical Med      polyethylene glycol (GLYCOLAX) 17 GM/SCOOP powder Take 17 g by mouth daily. Indications: Constipation, Starting Fri 5/24/2024, Historical Med      predniSONE 20 mg tablet Take 20 mg by mouth daily. Indications: orthostatic hypotension, Starting Mon 6/10/2024, Historical Med      prochlorperazine (COMPAZINE) 10 mg tablet Take 10 mg by mouth every 6 (six) hours as needed for nausea or vomiting. CP MED ON HOLD  Indications: Nausea and Vomiting, Historical Med      QUEtiapine (SEROquel) 25 mg tablet Take 12.5 mg by mouth daily at bedtime., Starting Thu 9/26/2024, Historical Med      senna-docusate sodium (Senna S) 8.6-50 mg per tablet Take 2 tablets by mouth daily. and one tablet in the pm., Starting Tue 8/20/2024, Historical Med      testosterone cypionate (DEPO-TESTOSTERONE) 200 mg/mL SOLN INJECT 0.5ML IM ONCE WEEKLY., Normal      vardenafil (LEVITRA) 20 MG tablet Take 1 tablet (20 mg total) by mouth daily as needed for erectile dysfunction Take 1 tablet by mouth one (1) hour prior to intercourse. Limit to only 2 tablets per week., Starting Tue 11/30/2021, Normal      zinc oxide (Balmex Complete Protection) 11.3 % cream Apply 1 Application topically 2 (two) times a day. Indications: skin irritation, Starting Fri 5/24/2024, Historical Med       !! - Potential  duplicate medications found. Please discuss with provider.        No discharge procedures on file.  ED SEPSIS DOCUMENTATION   Time reflects when diagnosis was documented in both MDM as applicable and the Disposition within this note       Time User Action Codes Description Comment    9/26/2024  7:02 PM Elva Quinteros [Z51.5] Hospice care     9/26/2024  7:02 PM Elva Quinteros [W19.XXXA] Fall, initial encounter                  Elva Quinteros,   09/28/24 3331

## 2024-09-27 ENCOUNTER — HOME CARE VISIT (OUTPATIENT)
Dept: HOME HEALTH SERVICES | Facility: HOME HEALTHCARE | Age: 69
End: 2024-09-27
Payer: MEDICARE

## 2024-09-27 VITALS
DIASTOLIC BLOOD PRESSURE: 72 MMHG | RESPIRATION RATE: 22 BRPM | SYSTOLIC BLOOD PRESSURE: 128 MMHG | OXYGEN SATURATION: 96 % | HEART RATE: 88 BPM | TEMPERATURE: 97.6 F

## 2024-09-27 PROCEDURE — G0155 HHCP-SVS OF CSW,EA 15 MIN: HCPCS

## 2024-09-27 PROCEDURE — G0156 HHCP-SVS OF AIDE,EA 15 MIN: HCPCS

## 2024-09-27 PROCEDURE — G0300 HHS/HOSPICE OF LPN EA 15 MIN: HCPCS

## 2024-09-28 ENCOUNTER — HOME CARE VISIT (OUTPATIENT)
Dept: HOME HEALTH SERVICES | Facility: HOME HEALTHCARE | Age: 69
End: 2024-09-28
Payer: MEDICARE

## 2024-09-28 PROCEDURE — G0156 HHCP-SVS OF AIDE,EA 15 MIN: HCPCS

## 2024-09-30 ENCOUNTER — HOME CARE VISIT (OUTPATIENT)
Dept: HOME HOSPICE | Facility: HOSPICE | Age: 69
End: 2024-09-30
Payer: MEDICARE

## 2024-10-01 ENCOUNTER — HOME CARE VISIT (OUTPATIENT)
Dept: HOME HOSPICE | Facility: HOSPICE | Age: 69
End: 2024-10-01
Payer: MEDICARE

## 2024-10-02 ENCOUNTER — HOME CARE VISIT (OUTPATIENT)
Dept: HOME HOSPICE | Facility: HOSPICE | Age: 69
End: 2024-10-02
Payer: MEDICARE

## 2024-10-02 ENCOUNTER — HOME CARE VISIT (OUTPATIENT)
Dept: HOME HEALTH SERVICES | Facility: HOME HEALTHCARE | Age: 69
End: 2024-10-02
Payer: MEDICARE

## 2024-10-02 ENCOUNTER — NURSING HOME VISIT (OUTPATIENT)
Dept: GERIATRICS | Facility: OTHER | Age: 69
End: 2024-10-02
Payer: MEDICARE

## 2024-10-02 DIAGNOSIS — G20.A2 PARKINSON'S DISEASE WITHOUT DYSKINESIA, WITH FLUCTUATING MANIFESTATIONS (HCC): ICD-10-CM

## 2024-10-02 DIAGNOSIS — Z51.5 HOSPICE CARE PATIENT: ICD-10-CM

## 2024-10-02 DIAGNOSIS — K59.09 CHRONIC CONSTIPATION: ICD-10-CM

## 2024-10-02 DIAGNOSIS — F43.20 ADJUSTMENT DISORDER, UNSPECIFIED TYPE: ICD-10-CM

## 2024-10-02 DIAGNOSIS — Z51.5 HOSPICE CARE PATIENT: Primary | ICD-10-CM

## 2024-10-02 PROCEDURE — 99305 1ST NF CARE MODERATE MDM 35: CPT | Performed by: INTERNAL MEDICINE

## 2024-10-02 PROCEDURE — G0299 HHS/HOSPICE OF RN EA 15 MIN: HCPCS

## 2024-10-02 RX ORDER — MORPHINE SULFATE 100 MG/5ML
5 SOLUTION, CONCENTRATE ORAL EVERY 4 HOURS PRN
Qty: 15 ML | Refills: 0 | Status: SHIPPED | OUTPATIENT
Start: 2024-10-02 | End: 2024-10-04 | Stop reason: SDUPTHER

## 2024-10-02 RX ORDER — LORAZEPAM 1 MG/1
1 TABLET ORAL
Qty: 7 TABLET | Refills: 0 | Status: SHIPPED | OUTPATIENT
Start: 2024-10-02 | End: 2024-10-07

## 2024-10-02 RX ORDER — LORAZEPAM 1 MG/1
1 TABLET ORAL EVERY 4 HOURS PRN
Qty: 20 TABLET | Refills: 0 | Status: SHIPPED | OUTPATIENT
Start: 2024-10-02 | End: 2024-10-07

## 2024-10-02 RX ORDER — OXYCODONE HYDROCHLORIDE 5 MG/1
5 TABLET ORAL EVERY 4 HOURS PRN
Qty: 20 TABLET | Refills: 0 | Status: SHIPPED | OUTPATIENT
Start: 2024-10-02 | End: 2024-10-04

## 2024-10-02 RX ORDER — LORAZEPAM 1 MG/1
1 TABLET ORAL EVERY 6 HOURS PRN
Qty: 20 TABLET | Refills: 0 | Status: SHIPPED | OUTPATIENT
Start: 2024-10-02 | End: 2024-10-02 | Stop reason: DRUGHIGH

## 2024-10-02 NOTE — PROGRESS NOTES
[facility]  [address]  [place] [address]  [id number]    Nursing Home Admission    NAME: Lit Tripp  AGE: 69 y.o. SEX: male [phone]    DATE OF ENCOUNTER: 10/2/2024    Assessment/Plan:   Patient’s care was coordinated with nursing facility staff. Recent vitals, labs and updated medications were reviewed on [facility] system of facility. Past Medical, surgical, social, medication and allergy history and patient’s previous records reviewed.     1. Hospice care patient  oxyCODONE (ROXICODONE) 5 immediate release tablet    LORazepam (ATIVAN) 1 mg tablet    LORazepam (ATIVAN) 1 mg tablet      2. Chronic constipation        3. Parkinson's disease without dyskinesia, with fluctuating manifestations (HCC)        4. Adjustment disorder, unspecified type             Hospice care patient  Pt admitted under [facility]  Reviewed hospice MD's note from 2 days ago  Cont oxycodone  Cont ativan  Cont ativan  (Escript sent)  Defer to [facility] hospice if want to restart morphine (currently on hold)    Chronic constipation  Pt with hx of constipation  Cont senna  Cont miralax  Cont stool softeners as per hospice    Parkinson's disease without dyskinesia, with fluctuating manifestations (HCC)  Pt with slurred speech and parkinson's like symptoms  Cont entacapone  Cont sinemet    Adjustment disorder  Stable  Cont celexa  Pt recently had his seroquel dose increased to 25 mg    Chief Complaint     Here for respite/hospice care    HPI   Patient is a 69 y.o. male with PMH HTN, Parkinson's disease, and arthritis.    Pt recently admitted at [facility] inpatient hospice. Per records from hospice palliative care, pt making urine. Pt confused at night time. Pt calmer with family. Pt admitted at [facility] for respite care.    Pt seen and examined. Pt trying to speak and mentions his wife. Pt whispering and falls asleep.     Past Medical History:   Diagnosis Date    Arthritis      Complicated UTI (urinary tract infection) 2024    COVID-19 2024    Hypertension     Hyponatremia 2024    Incomplete emptying of bladder 2024    Nail dystrophy 2021    Obesity 2024    Onychomycosis 2017    Parkinson's disease (HCC)     2 years ago    Psoriasis        Past Surgical History:   Procedure Laterality Date    APPENDECTOMY      CHOLECYSTECTOMY      COLONOSCOPY      TESTICLE BIOPSY         Social History     Tobacco Use   Smoking Status Former    Current packs/day: 0.00    Types: Cigarettes    Quit date:     Years since quittin.7   Smokeless Tobacco Never          Family History   Problem Relation Age of Onset    Parkinsonism Mother         osnet in late 70s, lived into 90s.     Prostate cancer Father     Psoriasis Father     Diabetes Maternal Grandmother     Stroke Paternal Grandmother     Diabetes Paternal Grandfather     Parkinsonism Cousin         1st cousin         No Known Allergies       Current Outpatient Medications:     LORazepam (ATIVAN) 1 mg tablet, Take 1 tablet (1 mg total) by mouth daily at bedtime Pt on hospice/respite care, Disp: 7 tablet, Rfl: 0    oxyCODONE (ROXICODONE) 5 immediate release tablet, Take 1 tablet (5 mg total) by mouth every 4 (four) hours as needed for moderate pain or severe pain (or dyspnea) Pt on hospice/respite care Max Daily Amount: 30 mg, Disp: 20 tablet, Rfl: 0    acetaminophen (TYLENOL) 325 mg tablet, Take 650 mg by mouth every 6 (six) hours as needed for fever or mild pain. do not exceed 3000mg/day  Indications: Fever, Pain, Disp: , Rfl:     acetaminophen (TYLENOL) 650 mg suppository, Insert 650 mg into the rectum every 8 (eight) hours as needed for fever or mild pain. CP MED ON HOLD  Indications: Fever, Pain, Disp: , Rfl:     bisacodyl (DULCOLAX) 10 mg suppository, Insert 10 mg into the rectum daily as needed for constipation. Indications: Constipation, Disp: , Rfl:     carbidopa-levodopa (SINEMET)   mg per tablet, Take 2 tablets by mouth 3 (three) times a day. ALSO TAKE 1 TAB AT BEDTIME  Indications: Parkinson's Disease, Disp: , Rfl:     citalopram (CeleXA) 10 mg tablet, Take 20 mg by mouth daily. Indications: Major Depressive Disorder, Disp: , Rfl:     entacapone (COMTAN) 200 mg tablet, Take 200 mg by mouth 4 (four) times a day. Indications: Parkinson's Disease with Unknown Cause, Disp: , Rfl:     hyoscyamine (LEVSIN/SL) 0.125 mg SL tablet, Take 0.125 mg by mouth every 4 (four) hours as needed (SECRETIONS). Indications: SECRETIONS, Disp: , Rfl:     lactulose (CHRONULAC) 10 g/15 mL solution, Take 30 g by mouth 2 (two) times a day. Indications: Constipation, Disp: , Rfl:     lidocaine (XYLOCAINE) 5 % ointment, Apply 1 Application topically 3 (three) times a day as needed (pain). Indications: pain, Disp: , Rfl:     LORazepam (ATIVAN) 1 mg tablet, Take 1 tablet (1 mg total) by mouth every 4 (four) hours as needed for anxiety (agitation) for up to 10 days Pt on hospice/respite care, Disp: 20 tablet, Rfl: 0    nystatin (MYCOSTATIN) powder, Apply 1 Application topically 2 (two) times a day. Indications: rash, Disp: , Rfl:     oxygen gas, Inhale 2 L/min as needed (dyspnea). Indications: dyspnea, Disp: , Rfl:     polyethylene glycol (GLYCOLAX) 17 GM/SCOOP powder, Take 17 g by mouth daily. Indications: Constipation, Disp: , Rfl:     predniSONE 20 mg tablet, Take 2 tablets (40 mg total) by mouth daily, Disp: , Rfl:     prochlorperazine (COMPAZINE) 10 mg tablet, Take 10 mg by mouth every 6 (six) hours as needed for nausea or vomiting. CP MED ON HOLD  Indications: Nausea and Vomiting, Disp: , Rfl:     QUEtiapine (SEROquel) 25 mg tablet, Take 1 tablet (25 mg total) by mouth daily at bedtime, Disp: , Rfl:     senna-docusate sodium (Senna S) 8.6-50 mg per tablet, Take 2 tablets by mouth daily. and one tablet in the pm.   Indications: Constipation, Disp: , Rfl:     zinc oxide (Balmex Complete Protection) 11.3 % cream, Apply 1  Application topically 2 (two) times a day. Indications: skin irritation, Disp: , Rfl:     Updated list was reviewed in [facility] system of facility.     Vital signs were reviewed in [facility] care    A 12-point comprehensive review of symptoms was obtained.  Pertinent positives and negatives noted in HPI.   Review of Systems   All other systems reviewed and are negative.    Physical Exam  Constitutional:       General: He is not in acute distress.     Appearance: He is not ill-appearing.   HENT:      Head: Normocephalic.   Cardiovascular:      Rate and Rhythm: Regular rhythm.   Pulmonary:      Effort: Pulmonary effort is normal.   Abdominal:      General: There is no distension.      Tenderness: There is no abdominal tenderness.   Musculoskeletal:      Right lower leg: No edema.      Left lower leg: No edema.   Neurological:      Mental Status: He is alert.      Comments: Pt whispers   Psychiatric:      Comments: confused       Diagnostic Data   Recent labs and imaging studies were reviewed.  Code Status:    DNR/DNI/hospice  Additional notes:    Total time spent on H&P 35 minutes in reviewing discharge paperwork from the hospital, interpreting test results from hospital medical records, and documenting information in the medical record. In addition, I provided counseling to the patient and encouraged them to work with physical therapy.  Gave orders for patient's medications and nursing home admission orders.  Escripted pt's meds    This note was electronically signed by Dr. Leanna SANCHEZSAROJ [facility]

## 2024-10-03 ENCOUNTER — HOME CARE VISIT (OUTPATIENT)
Dept: HOME HEALTH SERVICES | Facility: HOME HEALTHCARE | Age: 69
End: 2024-10-03
Payer: MEDICARE

## 2024-10-03 VITALS
SYSTOLIC BLOOD PRESSURE: 108 MMHG | RESPIRATION RATE: 18 BRPM | TEMPERATURE: 97.3 F | HEART RATE: 82 BPM | OXYGEN SATURATION: 99 % | DIASTOLIC BLOOD PRESSURE: 72 MMHG

## 2024-10-03 PROCEDURE — G0300 HHS/HOSPICE OF LPN EA 15 MIN: HCPCS

## 2024-10-04 ENCOUNTER — HOME CARE VISIT (OUTPATIENT)
Dept: HOME HEALTH SERVICES | Facility: HOME HEALTHCARE | Age: 69
End: 2024-10-04
Payer: MEDICARE

## 2024-10-04 ENCOUNTER — HOME CARE VISIT (OUTPATIENT)
Dept: HOME HOSPICE | Facility: HOSPICE | Age: 69
End: 2024-10-04
Payer: MEDICARE

## 2024-10-04 VITALS — SYSTOLIC BLOOD PRESSURE: 124 MMHG | RESPIRATION RATE: 16 BRPM | DIASTOLIC BLOOD PRESSURE: 80 MMHG | HEART RATE: 70 BPM

## 2024-10-04 DIAGNOSIS — Z51.5 HOSPICE CARE PATIENT: Primary | ICD-10-CM

## 2024-10-04 DIAGNOSIS — G20.A2 PARKINSON'S DISEASE WITHOUT DYSKINESIA, WITH FLUCTUATING MANIFESTATIONS (HCC): ICD-10-CM

## 2024-10-04 PROCEDURE — G0299 HHS/HOSPICE OF RN EA 15 MIN: HCPCS

## 2024-10-04 PROCEDURE — G0156 HHCP-SVS OF AIDE,EA 15 MIN: HCPCS

## 2024-10-04 RX ORDER — MORPHINE SULFATE 100 MG/5ML
SOLUTION, CONCENTRATE ORAL
Qty: 30 ML | Refills: 0 | Status: SHIPPED | OUTPATIENT
Start: 2024-10-04 | End: 2024-10-09 | Stop reason: SDUPTHER

## 2024-10-04 NOTE — ASSESSMENT & PLAN NOTE
Pt admitted under [facility]  Reviewed hospice MD's note from 2 days ago  Cont oxycodone  Cont ativan  Cont ativan  (Escript sent)  Defer to [facility] hospice if want to restart morphine (currently on hold)

## 2024-10-04 NOTE — TELEPHONE ENCOUNTER
10/4/2024 1:05 PM  Critical access hospital facility patient requests refill of CII medication and medication adjusted due to change in patient condition.  Filled electronically via Epic as per [place] State Law.    Requested Prescriptions     Signed Prescriptions Disp Refills    Morphine Sulfate, Concentrate, 20 mg/mL concentrated solution 30 mL 0     Sig: Take 0.25 mL (5 mg total) by mouth every 8 (eight) hours. May also take 0.25 mL (5 mg total) every 2 (two) hours as needed (pain / shortness of breath). Max Daily Amount: 75 mg.     Authorizing Provider: LEANNE SZYMANSKI CRNP  [facility]  [place]ospice Answering Service: [phone]  You can find me on [email or web address]!

## 2024-10-05 ENCOUNTER — HOME CARE VISIT (OUTPATIENT)
Dept: HOME HEALTH SERVICES | Facility: HOME HEALTHCARE | Age: 69
End: 2024-10-05
Payer: MEDICARE

## 2024-10-05 VITALS — TEMPERATURE: 97.9 F | DIASTOLIC BLOOD PRESSURE: 88 MMHG | RESPIRATION RATE: 18 BRPM | SYSTOLIC BLOOD PRESSURE: 118 MMHG

## 2024-10-05 PROCEDURE — G0299 HHS/HOSPICE OF RN EA 15 MIN: HCPCS

## 2024-10-07 NOTE — TELEPHONE ENCOUNTER
10/7/2024 1:13 PM  [facility] facility patient requests refill of CII medication and medication adjusted due to change in patient condition.  Filled electronically via Epic as per [place] State Law.    Requested Prescriptions     Signed Prescriptions Disp Refills    LORazepam (ATIVAN) 2 mg/mL concentrated solution 30 mL 0     Sig: Take 0.5 mL (1 mg total) by mouth every 8 (eight) hours. May also take 0.5 mL (1 mg total) every 2 (two) hours as needed for anxiety or sleep (dyspnea).     Authorizing Provider: LEANNE SZYMANSKI CRNP  [facility]  Hospice Answering Service: [phone]  You can find me on [employer]!

## 2024-10-09 NOTE — TELEPHONE ENCOUNTER
10/9/2024 11:50 AM  Formerly [facility], [facility]–1983 facility patient requests refill of CII medication and medication adjusted due to change in patient condition.  Filled electronically via Epic as per [place] State Law.    Requested Prescriptions     Signed Prescriptions Disp Refills    LORazepam (ATIVAN) 2 mg/mL concentrated solution 30 mL 0     Sig: Take 1 mL (2 mg total) by mouth every 4 (four) hours. May also take 1 mL (2 mg total) every hour as needed for anxiety or sleep (dyspnea).     Authorizing Provider: LEANNE SZYMANSKI    Morphine Sulfate, Concentrate, 20 mg/mL concentrated solution 30 mL 0     Sig: Take 0.5 mL (10 mg total) by mouth every 4 (four) hours. May also take 0.5 mL (10 mg total) every hour as needed (pain / shortness of breath). Max Daily Amount: 300 mg.     Authorizing Provider: LEANNE SZYMANSKI CRNP  [facility] Nurse [facility] Answering Service: [phone]  You can find me on [employer]!

## 2024-10-10 NOTE — TELEPHONE ENCOUNTER
Jamia with [facility] requesting provider's signature on death certificate.    Patient passed on 10/10/24 at 1219 hours.  Advised caller patient was also under hospice care and perhaps the Hospice Doctor may need to sign the certificate.  Caller expressed understanding.    Apparently [employer] system has been replaced with [employer].      Hardcopy of death certificate to be faxed to [place] office.

## 2024-10-10 NOTE — TELEPHONE ENCOUNTER
Received hard copy of Death Certificate to be filled out from Sari Ray @ [facility] ([phone])    Routed to provider.

## 2024-10-14 NOTE — TELEPHONE ENCOUNTER
Sari from [facility] calling back.  She states they still need the date added.  She states this was done online, everything else is correct but the date is missing.  They cannot move forward until this is done.    Sari requested a call back from staff once this is complete.

## 2024-10-15 ENCOUNTER — DOCUMENTATION (OUTPATIENT)
Dept: GERIATRICS | Facility: OTHER | Age: 69
End: 2024-10-15

## 2024-10-15 ENCOUNTER — HOME CARE VISIT (OUTPATIENT)
Dept: HOME HOSPICE | Facility: HOSPICE | Age: 69
End: 2024-10-15
Payer: MEDICARE

## 2024-10-15 NOTE — PROGRESS NOTES
Diagnoses upon death:  1)Dementia  2)Parkinson's disease    Pt DNR/DNI/hospice.  Pt pronounced on 10/10/2024 at 0019    Already filled out paper death certificate and confirmed with  home receipt of paper death certificate.    (Difficulties with online new system affirmation)

## 2025-01-08 NOTE — TELEPHONE ENCOUNTER
Left a message to call back as there has been a cancellation and we would like to see if Matthew Drummond can come in tomorrow  Current Events